# Patient Record
Sex: FEMALE | Race: WHITE | NOT HISPANIC OR LATINO | Employment: UNEMPLOYED | URBAN - METROPOLITAN AREA
[De-identification: names, ages, dates, MRNs, and addresses within clinical notes are randomized per-mention and may not be internally consistent; named-entity substitution may affect disease eponyms.]

---

## 2017-01-16 ENCOUNTER — ALLSCRIPTS OFFICE VISIT (OUTPATIENT)
Dept: OTHER | Facility: OTHER | Age: 42
End: 2017-01-16

## 2017-01-16 DIAGNOSIS — E78.5 HYPERLIPIDEMIA: ICD-10-CM

## 2017-04-02 ENCOUNTER — OFFICE VISIT (OUTPATIENT)
Dept: URGENT CARE | Facility: CLINIC | Age: 42
End: 2017-04-02

## 2017-04-02 PROCEDURE — 99214 OFFICE O/P EST MOD 30 MIN: CPT

## 2017-04-04 ENCOUNTER — ALLSCRIPTS OFFICE VISIT (OUTPATIENT)
Dept: OTHER | Facility: OTHER | Age: 42
End: 2017-04-04

## 2017-04-13 ENCOUNTER — ALLSCRIPTS OFFICE VISIT (OUTPATIENT)
Dept: OTHER | Facility: OTHER | Age: 42
End: 2017-04-13

## 2017-04-13 DIAGNOSIS — J01.10 ACUTE FRONTAL SINUSITIS: ICD-10-CM

## 2017-04-19 ENCOUNTER — GENERIC CONVERSION - ENCOUNTER (OUTPATIENT)
Dept: OTHER | Facility: OTHER | Age: 42
End: 2017-04-19

## 2017-07-24 ENCOUNTER — ALLSCRIPTS OFFICE VISIT (OUTPATIENT)
Dept: OTHER | Facility: OTHER | Age: 42
End: 2017-07-24

## 2017-07-24 DIAGNOSIS — I10 ESSENTIAL (PRIMARY) HYPERTENSION: ICD-10-CM

## 2018-01-13 VITALS
DIASTOLIC BLOOD PRESSURE: 90 MMHG | WEIGHT: 213.06 LBS | HEART RATE: 85 BPM | OXYGEN SATURATION: 98 % | SYSTOLIC BLOOD PRESSURE: 140 MMHG | HEIGHT: 63 IN | RESPIRATION RATE: 18 BRPM | TEMPERATURE: 98 F | BODY MASS INDEX: 37.75 KG/M2

## 2018-01-13 VITALS
DIASTOLIC BLOOD PRESSURE: 58 MMHG | TEMPERATURE: 98.5 F | SYSTOLIC BLOOD PRESSURE: 112 MMHG | RESPIRATION RATE: 18 BRPM | HEART RATE: 105 BPM | BODY MASS INDEX: 37.81 KG/M2 | WEIGHT: 213.44 LBS | OXYGEN SATURATION: 96 %

## 2018-01-13 VITALS
SYSTOLIC BLOOD PRESSURE: 150 MMHG | HEIGHT: 63 IN | DIASTOLIC BLOOD PRESSURE: 90 MMHG | TEMPERATURE: 98.3 F | RESPIRATION RATE: 18 BRPM | WEIGHT: 218.56 LBS | HEART RATE: 97 BPM | OXYGEN SATURATION: 95 % | BODY MASS INDEX: 38.73 KG/M2

## 2018-01-13 NOTE — PROGRESS NOTES
Assessment    1  Encounter for preventive health examination (V70 0) (Z00 00)   2  Generalized anxiety disorder (300 02) (F41 1)   3  Acute upper respiratory infection (465 9) (J06 9)   4  PVC (premature ventricular contraction) (427 69) (I49 3)   5  White coat hypertension (796 2) (R03 0)   6  Meralgia paraesthetica, left (355 1) (G57 12)   7  Dyslipidemia (272 4) (E78 5)    Plan  Acute upper respiratory infection    · Promethazine-Codeine 6 25-10 MG/5ML Oral Syrup; TAKE 5 - 10 ML EVERY 4  HOURS AS NEEDED FOR COUGH   · Ventolin  (90 Base) MCG/ACT Inhalation Aerosol Solution; INHALE 1 TO 2  PUFFS EVERY 6 HOURS AS NEEDED    Discussion/Summary    1  Health maintenance  - Anticipatory guidance given re: diet, exercise and discussed appropriate servings of fruits and vegetables  - Pt motivated to quit smoking and has switched to vapor cigarettes, assess on fu  - Deferring flu, pneumovax  Up to date on adacel  - Pap n/a given recent hysterectomy  - Pt deferred mammogram    2  Recent hysterectomy and oophorectomy for benign tumor  - FU with OBGYN    3  Meralgia parasthetica - post surgical  - Continue gabapentin     4  Generalized anxiety disorder  - Continue effexor    5  Obesity, white coat hypertension, dyslipidemia  - Check fasting labs and TSH before next visit  - Counseled on diet, exercise  - Normal ambulatory BP log    6  PVCs, history of palpitations  - Continue metoprolol     7  Acute URI  - Continue supportive care, prn albuterol for wheezing  Possible side effects of new medications were reviewed with the patient/guardian today  Chief Complaint  physical      History of Present Illness  HPI: Sheryl Current is here for her annual physical   She lives at home with her   She is currently not working  She describes her general health as good  She exercises for an hour five days a week  She does not eat fruits or vegetables  She denies all symptoms of depression    Her major life change this past year has been a hysterectomy after which she has noted weight gain  She has noted issues with sweats with exertion  She is a current smoker and reports drinking more than 6 drinks once a month but this is only in a social setting  She denies any recreational drug use  She denies any issues with urinary incontinence  Now has surgical menopause  She is concerned that she is not losing weight despite exercise and a low calorie diet and has been taking Raghu supplement  She has lost 17 lb since her last visit  Only other concern is 2 weeks of cold and cough, also has noted wheezing  Review of Systems    Constitutional: No fever, no chills, feels well, no tiredness, no recent weight gain or weight loss  Eyes: No complaints of eye pain, no red eyes, no eyesight problems, no discharge, no dry eyes, no itching of eyes  ENT: no complaints of earache, no loss of hearing, no nose bleeds, no nasal discharge, no sore throat, no hoarseness  Cardiovascular: No complaints of slow heart rate, no fast heart rate, no chest pain, no palpitations, no leg claudication, no lower extremity edema  Respiratory: No complaints of shortness of breath, no wheezing, no cough, no SOB on exertion, no orthopnea, no PND  Gastrointestinal: No complaints of abdominal pain, no constipation, no nausea or vomiting, no diarrhea, no bloody stools  Genitourinary: No complaints of dysuria, no incontinence, no pelvic pain, no dysmenorrhea, no vaginal discharge or bleeding  Musculoskeletal: No complaints of arthralgias, no myalgias, no joint swelling or stiffness, no limb pain or swelling  Integumentary: No complaints of skin rash or lesions, no itching, no skin wounds, no breast pain or lump  Neurological: No complaints of headache, no confusion, no convulsions, no numbness, no dizziness or fainting, no tingling, no limb weakness, no difficulty walking     Psychiatric: Not suicidal, no sleep disturbance, no anxiety or depression, no change in personality, no emotional problems  Endocrine: No complaints of proptosis, no hot flashes, no muscle weakness, no deepening of the voice, no feelings of weakness  Hematologic/Lymphatic: No complaints of swollen glands, no swollen glands in the neck, does not bleed easily, does not bruise easily  Active Problems    1  Generalized anxiety disorder (300 02) (F41 1)   2  Meralgia paraesthetica, left (355 1) (G57 12)   3  PVC (premature ventricular contraction) (427 69) (I49 3)   4  White coat hypertension (796 2) (R03 0)    Past Medical History    · History of hysterectomy (V88 01) (Z90 710)    Surgical History    · History of Hysterectomy    Family History  Mother    · Family history of diabetes mellitus (V18 0) (Z83 3)   · Family history of malignant neoplasm (V16 9) (Z80 9)    Social History    · Current every day smoker (305 1) (F17 200)   · Lives with significant other   · Pets/Animals: Bird   · Pets/Animals: Cat   · Pets/Animals: Dog   · Social drinker   · Tobacco use (305 1) (Z72 0)    Current Meds   1  Gabapentin 100 MG Oral Capsule; TAKE 1 CAPSULE 2 TIMES DAILY; Therapy: 52NVO7253 to (Evaluate:04Ubb0224)  Requested for: 83Ylz5199; Last   Rx:[id number] Ordered   2  Metoprolol Succinate ER 50 MG Oral Tablet Extended Release 24 Hour; Take 1 tablet   daily; Therapy: 42DMJ5749 to (Evaluate:49Vci9689)  Requested for: 92XTB0873; Last   Rx:[id number] Ordered   3  Venlafaxine HCl ER 75 MG Oral Capsule Extended Release 24 Hour; TAKE 1 CAPSULE   DAILY; Therapy: 01TRW7316 to (Evaluate:25Oct2016)  Requested for: 07Hbp1563; Last   Rx:[id number] Ordered    Allergies    1  No Known Drug Allergies    2  No Known Environmental Allergies   3   No Known Food Allergies    Vitals   Recorded: 32TMH7609 73:09SM   Systolic 436   Diastolic 82   Heart Rate 94   Respiration 18   Temperature 98 7 F   Height 5 ft 2 75 in   Weight 197 lb 7 0 oz   BMI Calculated 35 26   BSA Calculated 1 92     Physical Exam    Constitutional   General appearance: No acute distress, well appearing and well nourished  Eyes   Conjunctiva and lids: No swelling, erythema or discharge  Pupils and irises: Equal, round, reactive to light  Ophthalmoscopic examination: Normal fundi and optic discs  Ears, Nose, Mouth, and Throat   External inspection of ears and nose: Normal     Otoscopic examination: Tympanic membranes translucent with normal light reflex  Canals patent without erythema  Hearing: Normal     Nasal mucosa, septum, and turbinates: Abnormal   clear nasal dishcarge  Lips, teeth, and gums: Normal, good dentition  Oropharynx: Normal with no erythema, edema, exudate or lesions  Neck   Neck: Supple, symmetric, trachea midline, no masses  Thyroid: Normal, no thyromegaly  Pulmonary   Respiratory effort: No increased work of breathing or signs of respiratory distress  Percussion of chest: Normal     Palpation of chest: Normal     Auscultation of lungs: Abnormal   scattered wheezing  Cardiovascular   Palpation of heart: Normal PMI, no thrills  Auscultation of heart: Normal rate and rhythm, normal S1 and S2, no murmurs  Carotid pulses: 2+ bilaterally  Abdominal aorta: Normal     Femoral pulses: 2+ bilaterally  Pedal pulses: 2+ bilaterally  Examination of extremities for edema and/or varicosities: Normal     Chest   Breasts: Normal, no dimpling or skin changes appreciated  Palpation of breasts and axillae: Normal, no masses palpated  Abdomen   Abdomen: Non-tender, no masses  Liver and spleen: No hepatomegaly or splenomegaly  Examination for hernias: No hernia appreciated  Anus, perineum, and rectum: Normal sphincter tone, no masses, no prolapse  Stool sample for occult blood: Negative  Genitourinary   External genitalia and vagina: Normal, no lesions appreciated  Urethra: Normal, no discharge  Bladder: Not distended, no tenderness      Cervix: Normal, no lesions, Pap obtained  Uterus: Normal size, no tenderness, no masses  Adnexa/Parametria: Normal, no masses or tenderness  Lymphatic   Palpation of lymph nodes in neck: No lymphadenopathy  Palpation of lymph nodes in axillae: No lymphadenopathy  Palpation of lymph nodes in groin: No lymphadenopathy  Palpation of lymph nodes in other areas: No lymphadenopathy  Musculoskeletal   Gait and station: Normal     Digits and nails: Normal without clubbing or cyanosis  Joints, bones, and muscles: Normal     Range of motion: Normal     Stability: Normal     Muscle strength/tone: Normal     Skin   Skin and subcutaneous tissue: Normal without rashes or lesions  Palpation of skin and subcutaneous tissue: Normal turgor  Neurologic   Cranial nerves: Cranial nerves II-XII intact  Reflexes: 2+ and symmetric  Sensation: No sensory loss  Psychiatric   Judgment and insight: Normal     Orientation to person, place, and time: Normal     Recent and remote memory: Intact      Mood and affect: Normal        Signatures   Electronically signed by : Abby West MD; Aug 29 2016  3:51PM EST                       (Author)

## 2018-01-14 VITALS
SYSTOLIC BLOOD PRESSURE: 120 MMHG | OXYGEN SATURATION: 96 % | BODY MASS INDEX: 36.75 KG/M2 | DIASTOLIC BLOOD PRESSURE: 80 MMHG | HEART RATE: 96 BPM | TEMPERATURE: 98.7 F | HEIGHT: 63 IN | WEIGHT: 207.44 LBS | RESPIRATION RATE: 18 BRPM

## 2018-04-12 LAB
ALBUMIN SERPL BCP-MCNC: 4.5 G/DL (ref 3.5–5.7)
ALP SERPL-CCNC: 62 IU/L (ref 40–150)
ALT SERPL W P-5'-P-CCNC: 19 IU/L (ref 0–50)
AMYLASE (HISTORICAL): 49 U/L (ref 29–103)
ANION GAP SERPL CALCULATED.3IONS-SCNC: 12.2 MM/L
APTT PPP: 30.2 SEC (ref 24.4–37.6)
AST SERPL W P-5'-P-CCNC: 16 U/L (ref 7–26)
BASOPHILS # BLD AUTO: 0.1 X3/UL (ref 0–0.3)
BASOPHILS # BLD AUTO: 0.7 % (ref 0–2)
BILIRUB SERPL-MCNC: 0.3 MG/DL (ref 0.3–1)
BILIRUB UR QL STRIP: NEGATIVE
BUN SERPL-MCNC: 16 MG/DL (ref 7–25)
CALCIUM SERPL-MCNC: 9.3 MG/DL (ref 8.6–10.5)
CHLORIDE SERPL-SCNC: 102 MM/L (ref 98–107)
CLARITY UR: NORMAL
CO2 SERPL-SCNC: 24 MM/L (ref 21–31)
COLOR UR: YELLOW
CREAT SERPL-MCNC: 0.68 MG/DL (ref 0.6–1.2)
DEPRECATED RDW RBC AUTO: 13.6 % (ref 11.5–14.5)
EGFR (HISTORICAL): > 60 GFR
EGFR AFRICAN AMERICAN (HISTORICAL): > 60 GFR
EOSINOPHIL # BLD AUTO: 0.1 X3/UL (ref 0–0.5)
EOSINOPHIL NFR BLD AUTO: 0.8 % (ref 0–5)
GLUCOSE (HISTORICAL): 89 MG/DL (ref 65–99)
GLUCOSE UR STRIP-MCNC: NEGATIVE MG/DL
HCT VFR BLD AUTO: 45.6 % (ref 37–47)
HGB BLD-MCNC: 15.2 G/DL (ref 12–16)
HGB UR QL STRIP.AUTO: NEGATIVE
INR PPP: 0.92 (ref 0.9–1.5)
KETONES UR STRIP-MCNC: NEGATIVE MG/DL
LACTATE DEHYDROGENASE FLUID (HISTORICAL): 1.4 MM/L (ref 0.5–2)
LEUKOCYTE ESTERASE UR QL STRIP: NEGATIVE
LIPASE SERPL-CCNC: 76 U/L (ref 11–82)
LYMPHOCYTES # BLD AUTO: 3.3 X3/UL (ref 1.2–4.2)
LYMPHOCYTES NFR BLD AUTO: 24.2 % (ref 20.5–51.1)
MCH RBC QN AUTO: 31.8 PG (ref 26–34)
MCHC RBC AUTO-ENTMCNC: 33.3 G/DL (ref 31–36)
MCV RBC AUTO: 95.5 FL (ref 81–99)
MONOCYTES # BLD AUTO: 1 X3/UL (ref 0–1)
MONOCYTES NFR BLD AUTO: 7.4 % (ref 1.7–12)
NEUTROPHILS # BLD AUTO: 9.1 X3/UL (ref 1.4–6.5)
NEUTS SEG NFR BLD AUTO: 66.9 % (ref 42.2–75.2)
NITRITE UR QL STRIP: NEGATIVE
OSMOLALITY, SERUM (HISTORICAL): 269 MOSM (ref 262–291)
PH UR STRIP.AUTO: 6 [PH] (ref 4.5–8)
PLATELET # BLD AUTO: 340 X3/UL (ref 130–400)
PMV BLD AUTO: 8.5 FL (ref 8.6–11.7)
POTASSIUM SERPL-SCNC: 4.2 MM/L (ref 3.5–5.5)
PROT UR STRIP-MCNC: NEGATIVE MG/DL
PROTHROMBIN TIME (HISTORICAL): 10.7 SEC (ref 10.1–12.9)
RBC # BLD AUTO: 4.78 X6/UL (ref 3.9–5.2)
SODIUM SERPL-SCNC: 134 MM/L (ref 134–143)
SP GR UR STRIP.AUTO: 1.02 (ref 1–1.03)
TOTAL PROTEIN (HISTORICAL): 6.8 G/DL (ref 6.4–8.9)
UROBILINOGEN UR QL STRIP.AUTO: 0.2 EU/DL (ref 0.2–8)
WBC # BLD AUTO: 13.5 X3/UL (ref 4.8–10.8)

## 2018-05-04 ENCOUNTER — OFFICE VISIT (OUTPATIENT)
Dept: INTERNAL MEDICINE CLINIC | Facility: CLINIC | Age: 43
End: 2018-05-04
Payer: COMMERCIAL

## 2018-05-04 VITALS
BODY MASS INDEX: 38.74 KG/M2 | OXYGEN SATURATION: 95 % | DIASTOLIC BLOOD PRESSURE: 80 MMHG | HEART RATE: 91 BPM | TEMPERATURE: 98.6 F | SYSTOLIC BLOOD PRESSURE: 138 MMHG | HEIGHT: 64 IN | WEIGHT: 226.9 LBS

## 2018-05-04 DIAGNOSIS — Z13.6 SCREENING FOR CARDIOVASCULAR CONDITION: ICD-10-CM

## 2018-05-04 DIAGNOSIS — E55.9 VITAMIN D DEFICIENCY: ICD-10-CM

## 2018-05-04 DIAGNOSIS — E78.5 DYSLIPIDEMIA: ICD-10-CM

## 2018-05-04 DIAGNOSIS — F41.1 GENERALIZED ANXIETY DISORDER: ICD-10-CM

## 2018-05-04 DIAGNOSIS — F17.200 SMOKING: ICD-10-CM

## 2018-05-04 DIAGNOSIS — R63.5 WEIGHT GAIN: ICD-10-CM

## 2018-05-04 DIAGNOSIS — Z12.39 SCREENING FOR BREAST CANCER: ICD-10-CM

## 2018-05-04 DIAGNOSIS — I10 BENIGN ESSENTIAL HYPERTENSION: Primary | ICD-10-CM

## 2018-05-04 PROBLEM — IMO0001 SMOKING: Status: ACTIVE | Noted: 2018-05-04

## 2018-05-04 PROCEDURE — 99214 OFFICE O/P EST MOD 30 MIN: CPT | Performed by: NURSE PRACTITIONER

## 2018-05-04 RX ORDER — MELATONIN
1000 DAILY
COMMUNITY
End: 2018-08-16

## 2018-05-04 RX ORDER — NICOTINE 21 MG/24HR
1 PATCH, TRANSDERMAL 24 HOURS TRANSDERMAL EVERY 24 HOURS
Qty: 28 PATCH | Refills: 3 | Status: SHIPPED | OUTPATIENT
Start: 2018-05-04 | End: 2018-08-16

## 2018-05-04 RX ORDER — TERBINAFINE HYDROCHLORIDE 250 MG/1
250 TABLET ORAL DAILY
COMMUNITY
End: 2018-08-16

## 2018-05-04 RX ORDER — METOPROLOL SUCCINATE 50 MG/1
50 TABLET, EXTENDED RELEASE ORAL DAILY
Refills: 3 | COMMUNITY
Start: 2018-04-19 | End: 2018-08-16

## 2018-05-04 RX ORDER — VENLAFAXINE HYDROCHLORIDE 150 MG/1
CAPSULE, EXTENDED RELEASE ORAL
COMMUNITY
End: 2018-07-11 | Stop reason: DRUGHIGH

## 2018-05-04 RX ORDER — ALBUTEROL SULFATE 90 UG/1
AEROSOL, METERED RESPIRATORY (INHALATION) EVERY 4 HOURS
COMMUNITY
End: 2019-05-11 | Stop reason: SDUPTHER

## 2018-05-04 RX ORDER — ALPRAZOLAM 2 MG/1
TABLET ORAL
Refills: 5 | COMMUNITY
Start: 2018-04-30 | End: 2018-05-04

## 2018-05-04 RX ORDER — LISINOPRIL AND HYDROCHLOROTHIAZIDE 12.5; 1 MG/1; MG/1
TABLET ORAL
COMMUNITY
End: 2018-08-16

## 2018-05-04 RX ORDER — ALPRAZOLAM 1 MG/1
1 TABLET ORAL 2 TIMES DAILY
COMMUNITY
End: 2018-06-06 | Stop reason: SDUPTHER

## 2018-05-04 RX ORDER — FLUTICASONE FUROATE AND VILANTEROL 100; 25 UG/1; UG/1
POWDER RESPIRATORY (INHALATION)
COMMUNITY
End: 2018-08-16

## 2018-05-04 NOTE — PROGRESS NOTES
Assessment/Plan: Will order fasting labs for patient to have done  She is following up with therapy on the 18th and wants to be referred to Psychiatry and states her therapist will set her up with one  She is taking Effexor 150 mg daily and Xanax 1 mg BID  She was called in nicotine patches for smoking cessation  She contracts for safety today in the office  BP was rechecked and is 138/80 HR 91  She is taking Toprol XL 50 mg daily and Lisinopril/HCTZ 10-12 5 mg daily  Will give script for mammogram to have done  She was counseled on weight management and different avenues to take with this  She was advised I do not give weight loss medication  Will bring her back after her therapy appointment on the 18th and she was advised if any issues to call the office  No problem-specific Assessment & Plan notes found for this encounter           Problem List Items Addressed This Visit     Benign essential hypertension - Primary    Relevant Medications    lisinopril-hydrochlorothiazide (PRINZIDE,ZESTORETIC) 10-12 5 MG per tablet    metoprolol succinate (TOPROL-XL) 50 mg 24 hr tablet    Other Relevant Orders    Comprehensive metabolic panel    CBC and differential    TSH, 3rd generation with T4 reflex    Dyslipidemia    Relevant Orders    Comprehensive metabolic panel    CBC and differential    TSH, 3rd generation with T4 reflex    Generalized anxiety disorder    Relevant Medications    venlafaxine (EFFEXOR-XR) 150 mg 24 hr capsule    ALPRAZolam (XANAX) 1 mg tablet    nicotine (NICODERM CQ) 21 mg/24 hr TD 24 hr patch    Other Relevant Orders    Comprehensive metabolic panel    CBC and differential    TSH, 3rd generation with T4 reflex    Screening for cardiovascular condition    Relevant Orders    Lipid panel    Vitamin D deficiency    Relevant Orders    Vitamin D 1,25 dihydroxy    Screening for breast cancer    Relevant Orders    Mammo screening bilateral w 3d & cad    Smoking    Relevant Medications    nicotine (NICODERM CQ) 21 mg/24 hr TD 24 hr patch    Weight gain            Subjective:      Patient ID: Manasa Caba is a 37 y o  female  Anuel Chaves is here today to establish care as a new patient  She was seen by Dr Regla Del Real over one year ago and did start seeing Dr Amando Carl  She is having issues with anxiety and depression and is taking Effexor and Xanax 1 mg BID  She states she was taking Zoloft but did wean herself off of it  She states she is having crying spells and at times feels she is worthless  She did start a new job which is very stressful and she feels this is the cause of most of her symptoms  She is following up with a Therapist on May18th  She states she takes Xanax BID and never misses a dose and feels this helps her symptoms  She denies any chest pain, SOB, or palpitations  She is taking BP medication and did forget to take her medications today  She states she did have a hyster done around 3 years ago  She is smoking around 2 PPD and would like to quit and she states she drinking at least 3 glasses of vodka a night  She states this is concerning her and this is out of the norm for her  She also is worried about her recent weight gain and would like to try dieting  She has not had a mammogram and would like a referral for this  She offers no other complaints today  The following portions of the patient's history were reviewed and updated as appropriate:   She  has a past medical history of Depression; H/O: hysterectomy; Hypertension; and Obesity    She   Patient Active Problem List    Diagnosis Date Noted    Screening for cardiovascular condition 05/04/2018    Vitamin D deficiency 05/04/2018    Screening for breast cancer 05/04/2018    Smoking 05/04/2018    Weight gain 05/04/2018    Benign essential hypertension 04/04/2017    Dyslipidemia 08/29/2016    Generalized anxiety disorder 12/30/2015    PVC (premature ventricular contraction) 12/30/2015     She  has a past surgical history that includes Hysterectomy and Tumor removal   Her family history includes Cancer in her mother; Diabetes in her mother  She  reports that she has been smoking  She uses smokeless tobacco  She reports that she drinks about 12 6 oz of alcohol per week   She reports that she does not use drugs  Current Outpatient Prescriptions   Medication Sig Dispense Refill    albuterol (VENTOLIN HFA) 90 mcg/act inhaler every 4 (four) hours      ALPRAZolam (XANAX) 1 mg tablet Take 1 tablet by mouth 2 (two) times a day      cholecalciferol (VITAMIN D3) 1,000 units tablet Take 1,000 Units by mouth daily      fluticasone furoate-vilanterol (BREO ELLIPTA) Breo Ellipta 100 mcg-25 mcg/dose powder for inhalation      lisinopril-hydrochlorothiazide (PRINZIDE,ZESTORETIC) 10-12 5 MG per tablet lisinopril 10 mg-hydrochlorothiazide 12 5 mg tablet      metoprolol succinate (TOPROL-XL) 50 mg 24 hr tablet Take 50 mg by mouth daily  3    venlafaxine (EFFEXOR-XR) 150 mg 24 hr capsule venlafaxine  mg capsule,extended release 24 hr      nicotine (NICODERM CQ) 21 mg/24 hr TD 24 hr patch Place 1 patch on the skin every 24 hours 28 patch 3    terbinafine (LamISIL) 250 mg tablet Take 250 mg by mouth daily       No current facility-administered medications for this visit  No current outpatient prescriptions on file prior to visit  No current facility-administered medications on file prior to visit  She is allergic to fluoxetine       Review of Systems   Constitutional: Negative  HENT: Negative  Eyes: Negative  Respiratory: Negative  Cardiovascular: Negative  Gastrointestinal: Negative  Endocrine: Negative  Genitourinary: Negative  Musculoskeletal: Negative  Skin: Negative  Allergic/Immunologic: Negative  Neurological: Negative  Hematological: Negative  Psychiatric/Behavioral: The patient is nervous/anxious            Objective:      /80 (BP Location: Right arm, Patient Position: Sitting, Cuff Size: Large)   Pulse 91   Temp 98 6 °F (37 °C) (Temporal)   Ht 5' 3 5" (1 613 m)   Wt 103 kg (226 lb 14 4 oz)   SpO2 95%   BMI 39 56 kg/m²          Physical Exam   Constitutional: She is oriented to person, place, and time  She appears well-developed and well-nourished  HENT:   Head: Normocephalic and atraumatic  Right Ear: External ear normal    Left Ear: External ear normal    Nose: Nose normal    Mouth/Throat: Oropharynx is clear and moist    Eyes: Conjunctivae and EOM are normal  Pupils are equal, round, and reactive to light  Neck: Normal range of motion  Neck supple  Cardiovascular: Normal rate, normal heart sounds and intact distal pulses  Pulmonary/Chest: Effort normal and breath sounds normal    Abdominal: Soft  Bowel sounds are normal    Musculoskeletal: Normal range of motion  Neurological: She is alert and oriented to person, place, and time  She has normal reflexes  Skin: Skin is warm and dry  Psychiatric: She has a normal mood and affect  Her behavior is normal  Judgment and thought content normal    Vitals reviewed

## 2018-05-15 ENCOUNTER — OFFICE VISIT (OUTPATIENT)
Dept: URGENT CARE | Facility: CLINIC | Age: 43
End: 2018-05-15
Payer: COMMERCIAL

## 2018-05-15 VITALS
TEMPERATURE: 97.7 F | RESPIRATION RATE: 20 BRPM | OXYGEN SATURATION: 96 % | BODY MASS INDEX: 38.58 KG/M2 | DIASTOLIC BLOOD PRESSURE: 86 MMHG | SYSTOLIC BLOOD PRESSURE: 132 MMHG | HEART RATE: 94 BPM | WEIGHT: 226 LBS | HEIGHT: 64 IN

## 2018-05-15 DIAGNOSIS — R11.0 NAUSEA: ICD-10-CM

## 2018-05-15 DIAGNOSIS — K52.9 GASTROENTERITIS: Primary | ICD-10-CM

## 2018-05-15 PROCEDURE — 99213 OFFICE O/P EST LOW 20 MIN: CPT | Performed by: PHYSICIAN ASSISTANT

## 2018-05-15 RX ORDER — ONDANSETRON 4 MG/1
4 TABLET, ORALLY DISINTEGRATING ORAL EVERY 6 HOURS PRN
Qty: 15 TABLET | Refills: 0 | Status: SHIPPED | OUTPATIENT
Start: 2018-05-15 | End: 2018-08-16

## 2018-05-15 RX ORDER — ONDANSETRON 4 MG/1
4 TABLET, ORALLY DISINTEGRATING ORAL ONCE
Status: COMPLETED | OUTPATIENT
Start: 2018-05-15 | End: 2018-05-15

## 2018-05-15 RX ADMIN — ONDANSETRON 4 MG: 4 TABLET, ORALLY DISINTEGRATING ORAL at 09:07

## 2018-05-15 NOTE — PATIENT INSTRUCTIONS
Gastroenteritis   AMBULATORY CARE:   Gastroenteritis , or stomach flu, is an infection of the stomach and intestines  It is caused by bacteria, parasites, or viruses  Common symptoms include the following:   · Diarrhea or gas    · Nausea, vomiting, or poor appetite    · Abdominal cramps, pain, or gurgling    · Fever    · Tiredness or weakness    · Headaches or muscle aches with any of the above symptoms  Call 911 for any of the following:   · You have trouble breathing or a very fast pulse  Seek care immediately if:   · You see blood in your diarrhea  · You cannot stop vomiting  · You have not urinated for 12 hours  · You feel like you are going to faint  Contact your healthcare provider if:   · You have a fever  · You continue to vomit or have diarrhea, even after treatment  · You see worms in your diarrhea  · Your mouth or eyes are dry  You are not urinating as much or as often  · You have questions or concerns about your condition or care  Treatment for gastroenteritis  may include medicines to slow or stop your diarrhea or vomiting  You may also need medicines to treat an infection caused by bacteria or a parasite  Manage your symptoms:   · Drink liquids as directed  Ask your healthcare provider how much liquid to drink each day, and which liquids are best for you  You may also need to drink an oral rehydration solution (ORS)  An ORS has the right amounts of sugar, salt, and minerals in water to replace body fluids  · Eat bland foods  When you feel hungry, begin eating soft, bland foods  Examples are bananas, clear soup, potatoes, and applesauce  Do not have dairy products, alcohol, sugary drinks, or drinks with caffeine until you feel better  · Rest as much as possible  Slowly start to do more each day when you begin to feel better  Prevent the spread of germs:  Gastroenteritis can spread easily   Keep yourself, your family, and your surroundings clean to help prevent the spread of gastroenteritis:  · Wash your hands often  Use soap and water  Wash your hands after you use the bathroom, change a child's diapers, or sneeze  Wash your hands before you prepare or eat food  · Clean surfaces and do laundry often  Wash your clothes and towels separately from the rest of the laundry  Clean surfaces in your home with antibacterial  or bleach  · Clean food thoroughly and cook safely  Wash raw vegetables before you cook  Cook meat, fish, and eggs fully  Do not use the same dishes for raw meat as you do for other foods  Refrigerate any leftover food immediately  · Be aware when you camp or travel  Drink only clean water  Do not drink from rivers or lakes unless you purify or boil the water first  When you travel, drink bottled water and do not add ice  Do not eat fruit that has not been peeled  Do not eat raw fish or meat that is not fully cooked  Follow up with your healthcare provider as directed:  Write down your questions so you remember to ask them during your visits  © 2017 [facility] Information is for End User's use only and may not be sold, redistributed or otherwise used for commercial purposes  All illustrations and images included in CareNotes® are the copyrighted property of [employer] D [employer]  or [employer] Cast[employer]  The above information is an  only  It is not intended as medical advice for individual conditions or treatments  Talk to your doctor, nurse or pharmacist before following any medical regimen to see if it is safe and effective for you

## 2018-05-15 NOTE — PROGRESS NOTES
[facility]        NAME: Dami Chester is a 37 y o  female  : 1975    MRN: [id number]  DATE: May 15, 2018  TIME: 9:19 AM    Assessment and Plan   Gastroenteritis [K52 9]  1  Gastroenteritis  ondansetron (ZOFRAN-ODT) 4 mg disintegrating tablet   2  Nausea  ondansetron (ZOFRAN-ODT) dispersible tablet 4 mg         Patient Instructions      If the Zofran does not improve nausea and vomiting taking continue fluids go to the nearest emergency room for IV fluids  Follow up with PCP in 3-5 days  Proceed to  ER if symptoms worsen  Chief Complaint     Chief Complaint   Patient presents with    Vomiting     C/O nausea, vomiting, diarrhea and headache since last PM  Pt is concerned that it is from potato salad that she ate 2 days ago  Her  has the same symptoms  Pt is tolerating fluids  Pt had 3 episodes of vomiting today  History of Present Illness       Patient presents nausea vomiting diarrhea and headache which started last evening     Initially she believed it was secondary to potato salad to the  has the same type of symptoms  However, her father ate the same potato salad and does not have the same symptoms  Patient was able to tolerate apple juice this morning  He had 3 episodes of vomiting last evening and 3 episodes of this morning  Review of Systems   Review of Systems   Constitutional: Positive for appetite change  Negative for chills and fever  HENT: Negative for congestion, postnasal drip, rhinorrhea, sore throat and tinnitus  Eyes: Negative for redness  Respiratory: Negative for cough, shortness of breath and wheezing  Cardiovascular: Negative for chest pain and palpitations  Gastrointestinal: Positive for abdominal pain (generalized)  Genitourinary: Negative for difficulty urinating  Musculoskeletal: Negative for myalgias  Skin: Negative for rash  Neurological: Positive for light-headedness and headaches     Hematological: Negative for adenopathy  Current Medications       Current Outpatient Prescriptions:     albuterol (VENTOLIN HFA) 90 mcg/act inhaler, every 4 (four) hours, Disp: , Rfl:     ALPRAZolam (XANAX) 1 mg tablet, Take 1 tablet by mouth 2 (two) times a day, Disp: , Rfl:     cholecalciferol (VITAMIN D3) 1,000 units tablet, Take 1,000 Units by mouth daily, Disp: , Rfl:     fluticasone furoate-vilanterol (BREO ELLIPTA), Breo Ellipta 100 mcg-25 mcg/dose powder for inhalation, Disp: , Rfl:     lisinopril-hydrochlorothiazide (PRINZIDE,ZESTORETIC) 10-12 5 MG per tablet, lisinopril 10 mg-hydrochlorothiazide 12 5 mg tablet, Disp: , Rfl:     metoprolol succinate (TOPROL-XL) 50 mg 24 hr tablet, Take 50 mg by mouth daily, Disp: , Rfl: 3    venlafaxine (EFFEXOR-XR) 150 mg 24 hr capsule, venlafaxine  mg capsule,extended release 24 hr, Disp: , Rfl:     nicotine (NICODERM CQ) 21 mg/24 hr TD 24 hr patch, Place 1 patch on the skin every 24 hours, Disp: 28 patch, Rfl: 3    ondansetron (ZOFRAN-ODT) 4 mg disintegrating tablet, Take 1 tablet (4 mg total) by mouth every 6 (six) hours as needed for nausea or vomiting, Disp: 15 tablet, Rfl: 0    terbinafine (LamISIL) 250 mg tablet, Take 250 mg by mouth daily, Disp: , Rfl:   No current facility-administered medications for this visit       Current Allergies     Allergies as of 05/15/2018 - Reviewed 05/15/2018   Allergen Reaction Noted    Fluoxetine              The following portions of the patient's history were reviewed and updated as appropriate: allergies, current medications, past family history, past medical history, past social history, past surgical history and problem list      Past Medical History:   Diagnosis Date    Depression     H/O: hysterectomy     LAST ASSESSED 30 DEC 2015    Hypertension     Obesity     PVC (premature ventricular contraction)        Past Surgical History:   Procedure Laterality Date    HYSTERECTOMY      TUMOR REMOVAL         Family History Problem Relation Age of Onset    Diabetes Mother     Cancer Mother          Medications have been verified  Objective   /86   Pulse 94   Temp 97 7 °F (36 5 °C)   Resp 20   Ht 5' 3 5" (1 613 m)   Wt 103 kg (226 lb)   SpO2 96%   BMI 39 41 kg/m²        Physical Exam     Physical Exam   Constitutional: She is oriented to person, place, and time  She appears well-developed and well-nourished  HENT:   Head: Normocephalic and atraumatic  Right Ear: External ear normal    Left Ear: External ear normal    Nose: Nose normal    Mouth/Throat: Oropharynx is clear and moist    Eyes: Conjunctivae are normal    Neck: Neck supple  Cardiovascular: Normal rate, regular rhythm and normal heart sounds  Pulmonary/Chest: Effort normal and breath sounds normal    Abdominal: Soft  She exhibits no mass  There is no tenderness  There is no rebound and no guarding  Lymphadenopathy:     She has no cervical adenopathy  Neurological: She is alert and oriented to person, place, and time  Skin: Skin is warm and dry  No rash noted  Psychiatric: She has a normal mood and affect   Her behavior is normal  Judgment and thought content normal

## 2018-06-06 DIAGNOSIS — F41.9 ANXIETY: Primary | ICD-10-CM

## 2018-06-06 RX ORDER — ALPRAZOLAM 1 MG/1
0.5 TABLET ORAL
Qty: 60 TABLET | Refills: 0 | Status: SHIPPED | OUTPATIENT
Start: 2018-06-06 | End: 2018-07-11 | Stop reason: DRUGHIGH

## 2018-06-06 NOTE — TELEPHONE ENCOUNTER
Patient stated that she has gotten Xanax 2mg tablet in the past and splits it in half- taking 1/2 tablet twice daily  The order in the chart is 1mg BID

## 2018-06-07 NOTE — TELEPHONE ENCOUNTER
Received a call from [facility]  stating that they went to the pharmacy and picked up the RX  He states that she was taking 0 5tab of 2mg Xanax  Per Diana Rios, pt is to take 0 5mg bid and follow up with a psychiatrist to manage meds  He verbalized understanding

## 2018-07-11 ENCOUNTER — OFFICE VISIT (OUTPATIENT)
Dept: INTERNAL MEDICINE CLINIC | Facility: CLINIC | Age: 43
End: 2018-07-11
Payer: COMMERCIAL

## 2018-07-11 VITALS
SYSTOLIC BLOOD PRESSURE: 142 MMHG | WEIGHT: 229.2 LBS | BODY MASS INDEX: 39.13 KG/M2 | TEMPERATURE: 97.8 F | OXYGEN SATURATION: 96 % | DIASTOLIC BLOOD PRESSURE: 90 MMHG | HEIGHT: 64 IN | HEART RATE: 94 BPM

## 2018-07-11 DIAGNOSIS — R20.0 NUMBNESS AND TINGLING: ICD-10-CM

## 2018-07-11 DIAGNOSIS — R20.2 NUMBNESS AND TINGLING: ICD-10-CM

## 2018-07-11 DIAGNOSIS — M62.838 MUSCLE SPASMS OF NECK: ICD-10-CM

## 2018-07-11 DIAGNOSIS — M54.40 ACUTE LEFT-SIDED LOW BACK PAIN WITH SCIATICA, SCIATICA LATERALITY UNSPECIFIED: Primary | ICD-10-CM

## 2018-07-11 PROCEDURE — 99214 OFFICE O/P EST MOD 30 MIN: CPT | Performed by: NURSE PRACTITIONER

## 2018-07-11 RX ORDER — METHOCARBAMOL 500 MG/1
TABLET, FILM COATED ORAL
Qty: 60 TABLET | Refills: 0 | Status: SHIPPED | OUTPATIENT
Start: 2018-07-11 | End: 2018-08-16

## 2018-07-11 RX ORDER — GABAPENTIN 100 MG/1
100 CAPSULE ORAL 2 TIMES DAILY
Qty: 60 CAPSULE | Refills: 0 | Status: SHIPPED | OUTPATIENT
Start: 2018-07-11 | End: 2018-09-25

## 2018-07-11 NOTE — PROGRESS NOTES
Assessment/Plan: Patient at this time will not follow up with PT or Pain Management  Will call in Robaxin 500 mg BID PRN and Gabapentin 100 mg BID  I did explain this medications are only short term  She at this time wants to proceed with an EMG rather than an MRI due to her co-pay  I did advise her to continue her BP medications and she should follow up with Psychiatry at this time for her anxiety  I did advise her I will not be prescribing any Benzos at this time and my recommendation is following up with Psychiatry  She did comply and states she will continue her BP medications and will consider an appointment with counseling  Will follow up as needed  No problem-specific Assessment & Plan notes found for this encounter  Problem List Items Addressed This Visit     Acute left-sided low back pain with sciatica - Primary    Relevant Medications    gabapentin (NEURONTIN) 100 mg capsule    Other Relevant Orders    EMG 1 Limb    Numbness and tingling    Relevant Medications    gabapentin (NEURONTIN) 100 mg capsule    Other Relevant Orders    EMG 1 Limb    Muscle spasms of neck    Relevant Medications    methocarbamol (ROBAXIN) 500 mg tablet            Subjective:      Patient ID: Alexander Chaudhary is a 37 y o  female  Mely Dick is here today for an acute visit  She is having left lower back and groin pain  She states the pain started around one month ago and is now radiating down her left lower leg  She states both her feet are having numbness and tingling as well  In the past she was on Gabapentin which she felt her dose was not high enough and did not work  She states she did also stop all her medications due to her gaining weight  She states she still does have panic attacks but does not want to see Psychiatry  She states she is doing much better since stopping her Effexor and Xanax but is not sure what to do when having panic attacks    She states she did switch jobs and this has been much better with her anxiety  She does stand all day on her feet and states this is when her pain is the worse  She states she has been applying muscle rubs and ice which is not helping  She is not willing to go to pain management or PT at this time  She offers no other issues  The following portions of the patient's history were reviewed and updated as appropriate:   She  has a past medical history of Depression; H/O: hysterectomy; Hypertension; Obesity; and PVC (premature ventricular contraction)  She   Patient Active Problem List    Diagnosis Date Noted    Acute left-sided low back pain with sciatica 07/11/2018    Numbness and tingling 07/11/2018    Muscle spasms of neck 07/11/2018    Screening for cardiovascular condition 05/04/2018    Vitamin D deficiency 05/04/2018    Screening for breast cancer 05/04/2018    Smoking 05/04/2018    Weight gain 05/04/2018    Benign essential hypertension 04/04/2017    Dyslipidemia 08/29/2016    Generalized anxiety disorder 12/30/2015    PVC (premature ventricular contraction) 12/30/2015     She  has a past surgical history that includes Hysterectomy and Tumor removal   Her family history includes Cancer in her mother; Diabetes in her mother  She  reports that she has been smoking  She uses smokeless tobacco  She reports that she does not drink alcohol or use drugs    Current Outpatient Prescriptions   Medication Sig Dispense Refill    albuterol (VENTOLIN HFA) 90 mcg/act inhaler every 4 (four) hours      fluticasone furoate-vilanterol (BREO ELLIPTA) Breo Ellipta 100 mcg-25 mcg/dose powder for inhalation      cholecalciferol (VITAMIN D3) 1,000 units tablet Take 1,000 Units by mouth daily      gabapentin (NEURONTIN) 100 mg capsule Take 1 capsule (100 mg total) by mouth 2 (two) times a day 60 capsule 0    lisinopril-hydrochlorothiazide (PRINZIDE,ZESTORETIC) 10-12 5 MG per tablet lisinopril 10 mg-hydrochlorothiazide 12 5 mg tablet      methocarbamol (ROBAXIN) 500 mg tablet Take one tablet by mouth every 12 hours as needed 60 tablet 0    metoprolol succinate (TOPROL-XL) 50 mg 24 hr tablet Take 50 mg by mouth daily  3    nicotine (NICODERM CQ) 21 mg/24 hr TD 24 hr patch Place 1 patch on the skin every 24 hours 28 patch 3    ondansetron (ZOFRAN-ODT) 4 mg disintegrating tablet Take 1 tablet (4 mg total) by mouth every 6 (six) hours as needed for nausea or vomiting 15 tablet 0    terbinafine (LamISIL) 250 mg tablet Take 250 mg by mouth daily       No current facility-administered medications for this visit  Current Outpatient Prescriptions on File Prior to Visit   Medication Sig    albuterol (VENTOLIN HFA) 90 mcg/act inhaler every 4 (four) hours    fluticasone furoate-vilanterol (BREO ELLIPTA) Breo Ellipta 100 mcg-25 mcg/dose powder for inhalation    cholecalciferol (VITAMIN D3) 1,000 units tablet Take 1,000 Units by mouth daily    lisinopril-hydrochlorothiazide (PRINZIDE,ZESTORETIC) 10-12 5 MG per tablet lisinopril 10 mg-hydrochlorothiazide 12 5 mg tablet    metoprolol succinate (TOPROL-XL) 50 mg 24 hr tablet Take 50 mg by mouth daily    nicotine (NICODERM CQ) 21 mg/24 hr TD 24 hr patch Place 1 patch on the skin every 24 hours    ondansetron (ZOFRAN-ODT) 4 mg disintegrating tablet Take 1 tablet (4 mg total) by mouth every 6 (six) hours as needed for nausea or vomiting    terbinafine (LamISIL) 250 mg tablet Take 250 mg by mouth daily    [DISCONTINUED] ALPRAZolam (XANAX) 1 mg tablet Take 0 5 tablets (0 5 mg total) by mouth daily at bedtime as needed for anxiety    [DISCONTINUED] venlafaxine (EFFEXOR-XR) 150 mg 24 hr capsule venlafaxine  mg capsule,extended release 24 hr     No current facility-administered medications on file prior to visit  She is allergic to fluoxetine       Review of Systems   Constitutional: Negative  HENT: Negative  Eyes: Negative  Respiratory: Negative  Cardiovascular: Negative      Gastrointestinal: Negative  Endocrine: Negative  Genitourinary: Negative  Musculoskeletal: Positive for arthralgias, back pain and myalgias  Skin: Negative  Allergic/Immunologic: Negative  Hematological: Negative  Psychiatric/Behavioral: Negative  Objective:      /90 (BP Location: Right arm, Patient Position: Sitting, Cuff Size: Large)   Pulse 94   Temp 97 8 °F (36 6 °C) (Temporal)   Ht 5' 3 5" (1 613 m)   Wt 104 kg (229 lb 3 2 oz)   SpO2 96%   BMI 39 96 kg/m²          Physical Exam   Constitutional: She is oriented to person, place, and time  She appears well-developed and well-nourished  HENT:   Head: Normocephalic and atraumatic  Right Ear: External ear normal    Left Ear: External ear normal    Nose: Nose normal    Mouth/Throat: Oropharynx is clear and moist    Eyes: Conjunctivae and EOM are normal  Pupils are equal, round, and reactive to light  Neck: Normal range of motion  Neck supple  Cardiovascular: Normal rate, regular rhythm, normal heart sounds and intact distal pulses  Pulmonary/Chest: Effort normal and breath sounds normal    Abdominal: Soft  Bowel sounds are normal    Musculoskeletal: Normal range of motion  Pain with flexion and extension noted to left lower extremity, pain on palpation to left upper groin and left lower back   Neurological: She is alert and oriented to person, place, and time  She has normal reflexes  Skin: Skin is warm and dry  Psychiatric: She has a normal mood and affect  Her behavior is normal  Judgment and thought content normal    Vitals reviewed

## 2018-08-16 ENCOUNTER — OFFICE VISIT (OUTPATIENT)
Dept: INTERNAL MEDICINE CLINIC | Facility: CLINIC | Age: 43
End: 2018-08-16
Payer: COMMERCIAL

## 2018-08-16 VITALS
HEIGHT: 64 IN | WEIGHT: 224 LBS | BODY MASS INDEX: 38.24 KG/M2 | SYSTOLIC BLOOD PRESSURE: 142 MMHG | OXYGEN SATURATION: 95 % | DIASTOLIC BLOOD PRESSURE: 86 MMHG | RESPIRATION RATE: 18 BRPM | TEMPERATURE: 99.4 F | HEART RATE: 110 BPM

## 2018-08-16 DIAGNOSIS — R20.0 NUMBNESS AND TINGLING: ICD-10-CM

## 2018-08-16 DIAGNOSIS — F41.1 GENERALIZED ANXIETY DISORDER: ICD-10-CM

## 2018-08-16 DIAGNOSIS — F17.200 SMOKING: ICD-10-CM

## 2018-08-16 DIAGNOSIS — I10 BENIGN ESSENTIAL HYPERTENSION: ICD-10-CM

## 2018-08-16 DIAGNOSIS — Z12.31 VISIT FOR SCREENING MAMMOGRAM: ICD-10-CM

## 2018-08-16 DIAGNOSIS — Z13.0 SCREENING FOR DEFICIENCY ANEMIA: ICD-10-CM

## 2018-08-16 DIAGNOSIS — Z13.1 SCREENING FOR DIABETES MELLITUS: ICD-10-CM

## 2018-08-16 DIAGNOSIS — F33.1 MODERATE EPISODE OF RECURRENT MAJOR DEPRESSIVE DISORDER (HCC): Primary | ICD-10-CM

## 2018-08-16 DIAGNOSIS — Z13.220 SCREENING, LIPID: ICD-10-CM

## 2018-08-16 DIAGNOSIS — I10 BENIGN ESSENTIAL HTN: ICD-10-CM

## 2018-08-16 DIAGNOSIS — J44.9 CHRONIC OBSTRUCTIVE PULMONARY DISEASE, UNSPECIFIED COPD TYPE (HCC): ICD-10-CM

## 2018-08-16 DIAGNOSIS — Z13.29 SCREENING FOR THYROID DISORDER: ICD-10-CM

## 2018-08-16 DIAGNOSIS — E55.9 VITAMIN D DEFICIENCY: ICD-10-CM

## 2018-08-16 DIAGNOSIS — I49.3 PVC (PREMATURE VENTRICULAR CONTRACTION): ICD-10-CM

## 2018-08-16 DIAGNOSIS — R63.5 WEIGHT GAIN: ICD-10-CM

## 2018-08-16 DIAGNOSIS — R20.2 NUMBNESS AND TINGLING: ICD-10-CM

## 2018-08-16 PROBLEM — M62.838 MUSCLE SPASMS OF NECK: Status: RESOLVED | Noted: 2018-07-11 | Resolved: 2018-08-16

## 2018-08-16 PROBLEM — F32.9 MAJOR DEPRESSIVE DISORDER WITH CURRENT ACTIVE EPISODE: Status: ACTIVE | Noted: 2018-08-16

## 2018-08-16 PROBLEM — M54.40 ACUTE LEFT-SIDED LOW BACK PAIN WITH SCIATICA: Status: RESOLVED | Noted: 2018-07-11 | Resolved: 2018-08-16

## 2018-08-16 PROCEDURE — 99214 OFFICE O/P EST MOD 30 MIN: CPT | Performed by: PHYSICIAN ASSISTANT

## 2018-08-16 RX ORDER — FLUTICASONE FUROATE AND VILANTEROL 100; 25 UG/1; UG/1
1 POWDER RESPIRATORY (INHALATION) DAILY
Qty: 1 INHALER | Refills: 2 | Status: SHIPPED | COMMUNITY
Start: 2018-08-16 | End: 2019-10-18 | Stop reason: SDUPTHER

## 2018-08-16 RX ORDER — ALPRAZOLAM 1 MG/1
1 TABLET ORAL
Qty: 30 TABLET | Refills: 0 | Status: SHIPPED | OUTPATIENT
Start: 2018-08-16 | End: 2018-09-13 | Stop reason: SDUPTHER

## 2018-08-16 RX ORDER — BUPROPION HYDROCHLORIDE 150 MG/1
150 TABLET ORAL DAILY
Qty: 30 TABLET | Refills: 3 | Status: SHIPPED | OUTPATIENT
Start: 2018-08-16 | End: 2018-09-25 | Stop reason: SDUPTHER

## 2018-08-16 RX ORDER — LISINOPRIL AND HYDROCHLOROTHIAZIDE 12.5; 1 MG/1; MG/1
1 TABLET ORAL DAILY
Qty: 30 TABLET | Refills: 5 | Status: SHIPPED | OUTPATIENT
Start: 2018-08-16 | End: 2019-02-13 | Stop reason: SDUPTHER

## 2018-08-16 RX ORDER — DULOXETIN HYDROCHLORIDE 60 MG/1
60 CAPSULE, DELAYED RELEASE ORAL DAILY
Qty: 30 CAPSULE | Refills: 2 | Status: SHIPPED | OUTPATIENT
Start: 2018-08-16 | End: 2018-09-25 | Stop reason: SDUPTHER

## 2018-08-16 RX ORDER — METOPROLOL SUCCINATE 50 MG/1
50 TABLET, EXTENDED RELEASE ORAL DAILY
Qty: 30 TABLET | Refills: 5 | Status: SHIPPED | OUTPATIENT
Start: 2018-08-16 | End: 2019-04-05 | Stop reason: SDUPTHER

## 2018-08-16 NOTE — PATIENT INSTRUCTIONS

## 2018-08-16 NOTE — PROGRESS NOTES
Assessment/Plan:    Benign essential hypertension  Restart lisinopril and metoprolol    Major depressive disorder with current active episode  Start cymbalta to combat depression and anxiety  This will also help her neuropathy symptoms  We discussed potential side effects  No hx of liver disease  Weight gain  Will add wellbutrin to combat weight gain and nicotine dependence  Diagnoses and all orders for this visit:    Moderate episode of recurrent major depressive disorder ([place] [address] )    Visit for screening mammogram  -     Mammo screening bilateral w 3d & cad; Future    Benign essential HTN    Screening for thyroid disorder  -     TSH, 3rd generation; Future    Screening, lipid  -     Lipid Panel with Direct LDL reflex; Future    Screening for diabetes mellitus  -     Comprehensive metabolic panel; Future    Screening for deficiency anemia  -     CBC and differential; Future    Vitamin D deficiency  -     Vitamin D 25 hydroxy; Future    Generalized anxiety disorder  -     DULoxetine (CYMBALTA) 60 mg delayed release capsule; Take 1 capsule (60 mg total) by mouth daily  -     ALPRAZolam (XANAX) 1 mg tablet; Take 1 tablet (1 mg total) by mouth daily at bedtime as needed for anxiety    PVC (premature ventricular contraction)  -     metoprolol succinate (TOPROL-XL) 50 mg 24 hr tablet; Take 1 tablet (50 mg total) by mouth daily    Benign essential hypertension  -     lisinopril-hydrochlorothiazide (PRINZIDE,ZESTORETIC) 10-12 5 MG per tablet; Take 1 tablet by mouth daily    Smoking  -     buPROPion (WELLBUTRIN XL) 150 mg 24 hr tablet; Take 1 tablet (150 mg total) by mouth daily    Weight gain  -     buPROPion (WELLBUTRIN XL) 150 mg 24 hr tablet; Take 1 tablet (150 mg total) by mouth daily    Chronic obstructive pulmonary disease, unspecified COPD type ([place] [address] )  -     fluticasone-vilanterol (BREO ELLIPTA) 100-25 mcg/inh inhaler; Inhale 1 puff daily Rinse mouth after use      Numbness and tingling          Subjective: Patient ID: Alejandro Wick is a 37 y o  female  Pt presents to establish care as a transfer from [facility]  PMHx significant for HTN, anxiety, hyperlipidemia, neuropathy and COPD  PSurgHx includes hysterectomy with single oophorectomy  Intolerance to prozac but no true drug allergies  Medications are noted in the chart  She is a current 2ppd smoker and has smoked since the age of 12  She denies alcohol or drug use  She works FT at [employer]  She is ,   Both parents are living, her mother has diabetes and DDD and her father has bladder CA  She has a brother with HTN  She is due for a mammogram and labs  She complains of ongoing neuropathy in her thighs  She has tried gabapentin and robaxin without much benefit  She also complains of ongoing anxiety and depression  Her energy level and motivation are decreased  She has some episodes of irritability  She has tried effexor, prozac, zoloft in the past without significant improvement  She is concerned about her weight and desires something to help lose weight  She feels this will also improve her mood and leg pain  She is interested in quitting smoking as well  The following portions of the patient's history were reviewed and updated as appropriate:   She  has a past medical history of Depression; H/O: hysterectomy; Hypertension; Obesity; and PVC (premature ventricular contraction)    She   Patient Active Problem List    Diagnosis Date Noted    Major depressive disorder with current active episode 2018    Numbness and tingling 2018    Screening for cardiovascular condition 2018    Vitamin D deficiency 2018    Screening for breast cancer 2018    Smoking 2018    Weight gain 2018    Benign essential hypertension 2017    Dyslipidemia 2016    Generalized anxiety disorder 2015    PVC (premature ventricular contraction) 2015     She  has a past surgical history that includes Hysterectomy and Tumor removal   Her family history includes Diabetes in her mother  She  reports that she has been smoking E-Cigarettes  She has never used smokeless tobacco  She reports that she drinks alcohol  She reports that she does not use drugs  Current Outpatient Prescriptions   Medication Sig Dispense Refill    gabapentin (NEURONTIN) 100 mg capsule Take 1 capsule (100 mg total) by mouth 2 (two) times a day 60 capsule 0    albuterol (VENTOLIN HFA) 90 mcg/act inhaler every 4 (four) hours      ALPRAZolam (XANAX) 1 mg tablet Take 1 tablet (1 mg total) by mouth daily at bedtime as needed for anxiety 30 tablet 0    buPROPion (WELLBUTRIN XL) 150 mg 24 hr tablet Take 1 tablet (150 mg total) by mouth daily 30 tablet 3    DULoxetine (CYMBALTA) 60 mg delayed release capsule Take 1 capsule (60 mg total) by mouth daily 30 capsule 2    fluticasone-vilanterol (BREO ELLIPTA) 100-25 mcg/inh inhaler Inhale 1 puff daily Rinse mouth after use  1 Inhaler 2    lisinopril-hydrochlorothiazide (PRINZIDE,ZESTORETIC) 10-12 5 MG per tablet Take 1 tablet by mouth daily 30 tablet 5    metoprolol succinate (TOPROL-XL) 50 mg 24 hr tablet Take 1 tablet (50 mg total) by mouth daily 30 tablet 5     No current facility-administered medications for this visit        Current Outpatient Prescriptions on File Prior to Visit   Medication Sig    gabapentin (NEURONTIN) 100 mg capsule Take 1 capsule (100 mg total) by mouth 2 (two) times a day    albuterol (VENTOLIN HFA) 90 mcg/act inhaler every 4 (four) hours    [DISCONTINUED] cholecalciferol (VITAMIN D3) 1,000 units tablet Take 1,000 Units by mouth daily    [DISCONTINUED] fluticasone furoate-vilanterol (BREO ELLIPTA) Breo Ellipta 100 mcg-25 mcg/dose powder for inhalation    [DISCONTINUED] lisinopril-hydrochlorothiazide (PRINZIDE,ZESTORETIC) 10-12 5 MG per tablet lisinopril 10 mg-hydrochlorothiazide 12 5 mg tablet    [DISCONTINUED] methocarbamol (ROBAXIN) 500 mg tablet Take one tablet by mouth every 12 hours as needed (Patient not taking: Reported on 8/16/2018 )    [DISCONTINUED] metoprolol succinate (TOPROL-XL) 50 mg 24 hr tablet Take 50 mg by mouth daily    [DISCONTINUED] nicotine (NICODERM CQ) 21 mg/24 hr TD 24 hr patch Place 1 patch on the skin every 24 hours (Patient not taking: Reported on 8/16/2018 )    [DISCONTINUED] ondansetron (ZOFRAN-ODT) 4 mg disintegrating tablet Take 1 tablet (4 mg total) by mouth every 6 (six) hours as needed for nausea or vomiting (Patient not taking: Reported on 8/16/2018 )    [DISCONTINUED] terbinafine (LamISIL) 250 mg tablet Take 250 mg by mouth daily     No current facility-administered medications on file prior to visit  She is allergic to fluoxetine       Review of Systems   Constitutional: Negative for chills and fever  HENT: Negative for congestion, ear pain, hearing loss, postnasal drip, rhinorrhea, sinus pain, sinus pressure, sore throat and trouble swallowing  Eyes: Negative for pain and visual disturbance  Respiratory: Negative for cough, chest tightness, shortness of breath and wheezing  Cardiovascular: Negative  Negative for chest pain, palpitations and leg swelling  Gastrointestinal: Negative for abdominal pain, blood in stool, constipation, diarrhea, nausea and vomiting  Endocrine: Negative for cold intolerance, heat intolerance, polydipsia, polyphagia and polyuria  Genitourinary: Negative for difficulty urinating, dysuria, flank pain and urgency  Musculoskeletal: Positive for myalgias  Negative for arthralgias, back pain and gait problem  Skin: Negative for rash  Allergic/Immunologic: Negative  Neurological: Positive for numbness  Negative for dizziness, weakness, light-headedness and headaches  Hematological: Negative  Psychiatric/Behavioral: Positive for dysphoric mood  Negative for behavioral problems and sleep disturbance  The patient is nervous/anxious  Objective:      /86 (BP Location: Left arm, Patient Position: Sitting, Cuff Size: Adult)   Pulse (!) 110   Temp 99 4 °F (37 4 °C) (Tympanic)   Resp 18   Ht 5' 3 5" (1 613 m)   Wt 102 kg (224 lb)   SpO2 95%   BMI 39 06 kg/m²          Physical Exam   Constitutional: She is oriented to person, place, and time  She appears well-developed and well-nourished  No distress  HENT:   Head: Normocephalic and atraumatic  Right Ear: External ear normal    Left Ear: External ear normal    Nose: Nose normal    Mouth/Throat: Oropharynx is clear and moist  No oropharyngeal exudate  Eyes: Conjunctivae and EOM are normal  Pupils are equal, round, and reactive to light  Right eye exhibits no discharge  Left eye exhibits no discharge  No scleral icterus  Neck: Normal range of motion  Neck supple  No thyromegaly present  Cardiovascular: Normal rate, regular rhythm and normal heart sounds  Exam reveals no gallop and no friction rub  No murmur heard  Pulmonary/Chest: Effort normal and breath sounds normal  No respiratory distress  She has no wheezes  She has no rales  Abdominal: Soft  Bowel sounds are normal  She exhibits no distension  There is no tenderness  Musculoskeletal: Normal range of motion  She exhibits no edema, tenderness or deformity  Neurological: She is alert and oriented to person, place, and time  A sensory deficit is present  No cranial nerve deficit  Skin: Skin is warm and dry  She is not diaphoretic  Psychiatric: Her behavior is normal  Judgment and thought content normal  Her mood appears anxious  She exhibits a depressed mood

## 2018-08-16 NOTE — ASSESSMENT & PLAN NOTE
Start cymbalta to combat depression and anxiety  This will also help her neuropathy symptoms  We discussed potential side effects  No hx of liver disease

## 2018-08-22 ENCOUNTER — TELEPHONE (OUTPATIENT)
Dept: INTERNAL MEDICINE CLINIC | Facility: CLINIC | Age: 43
End: 2018-08-22

## 2018-08-22 NOTE — TELEPHONE ENCOUNTER
That is a common side effect and should resolve with continued use, is she willing to give it a little more time?

## 2018-08-22 NOTE — TELEPHONE ENCOUNTER
Pt called, on cymbalta for 5 days now, feels it is helping her but pt has profuse sweating, very bad, is there anything she could do for that?

## 2018-09-13 DIAGNOSIS — F41.1 GENERALIZED ANXIETY DISORDER: ICD-10-CM

## 2018-09-14 RX ORDER — ALPRAZOLAM 1 MG/1
1 TABLET ORAL
Qty: 30 TABLET | Refills: 0 | Status: SHIPPED | OUTPATIENT
Start: 2018-09-14 | End: 2018-11-06 | Stop reason: SDUPTHER

## 2018-09-25 ENCOUNTER — OFFICE VISIT (OUTPATIENT)
Dept: INTERNAL MEDICINE CLINIC | Facility: CLINIC | Age: 43
End: 2018-09-25
Payer: COMMERCIAL

## 2018-09-25 VITALS
HEIGHT: 64 IN | WEIGHT: 221.4 LBS | TEMPERATURE: 97.9 F | OXYGEN SATURATION: 98 % | BODY MASS INDEX: 37.8 KG/M2 | DIASTOLIC BLOOD PRESSURE: 80 MMHG | RESPIRATION RATE: 18 BRPM | HEART RATE: 77 BPM | SYSTOLIC BLOOD PRESSURE: 130 MMHG

## 2018-09-25 DIAGNOSIS — R20.2 NUMBNESS AND TINGLING: ICD-10-CM

## 2018-09-25 DIAGNOSIS — F17.200 SMOKING: ICD-10-CM

## 2018-09-25 DIAGNOSIS — F41.1 GENERALIZED ANXIETY DISORDER: ICD-10-CM

## 2018-09-25 DIAGNOSIS — R63.5 WEIGHT GAIN: ICD-10-CM

## 2018-09-25 DIAGNOSIS — I10 BENIGN ESSENTIAL HYPERTENSION: Primary | ICD-10-CM

## 2018-09-25 DIAGNOSIS — R20.0 NUMBNESS AND TINGLING: ICD-10-CM

## 2018-09-25 PROCEDURE — 3075F SYST BP GE 130 - 139MM HG: CPT | Performed by: PHYSICIAN ASSISTANT

## 2018-09-25 PROCEDURE — 3008F BODY MASS INDEX DOCD: CPT | Performed by: PHYSICIAN ASSISTANT

## 2018-09-25 PROCEDURE — 99214 OFFICE O/P EST MOD 30 MIN: CPT | Performed by: PHYSICIAN ASSISTANT

## 2018-09-25 PROCEDURE — 3079F DIAST BP 80-89 MM HG: CPT | Performed by: PHYSICIAN ASSISTANT

## 2018-09-25 RX ORDER — DULOXETIN HYDROCHLORIDE 60 MG/1
60 CAPSULE, DELAYED RELEASE ORAL DAILY
Qty: 60 CAPSULE | Refills: 3 | Status: SHIPPED | OUTPATIENT
Start: 2018-09-25 | End: 2018-10-01 | Stop reason: SDUPTHER

## 2018-09-25 RX ORDER — BUPROPION HYDROCHLORIDE 300 MG/1
300 TABLET ORAL DAILY
Qty: 30 TABLET | Refills: 5 | Status: SHIPPED | OUTPATIENT
Start: 2018-09-25 | End: 2018-10-01 | Stop reason: SDUPTHER

## 2018-09-25 RX ORDER — TERBINAFINE HYDROCHLORIDE 250 MG/1
250 TABLET ORAL
COMMUNITY
Start: 2018-09-10 | End: 2019-09-18 | Stop reason: ALTCHOICE

## 2018-09-25 NOTE — ASSESSMENT & PLAN NOTE
Will further increase cymbalta to see if this will provide resolution of symptoms as she has had robust results at a lesser dose

## 2018-09-25 NOTE — PROGRESS NOTES
Assessment/Plan:    Benign essential hypertension  Pts symptoms are stable with current regime  No changes at present  Smoking  Will increase wellbutrin to 300mg to combat nicotine dependence  If no benefit consider chantix  Numbness and tingling  Will further increase cymbalta to see if this will provide resolution of symptoms as she has had robust results at a lesser dose  Diagnoses and all orders for this visit:    Benign essential hypertension    Generalized anxiety disorder  -     DULoxetine (CYMBALTA) 60 mg delayed release capsule; Take 1 capsule (60 mg total) by mouth daily    Smoking  -     buPROPion (WELLBUTRIN XL) 300 mg 24 hr tablet; Take 1 tablet (300 mg total) by mouth daily    Weight gain  -     buPROPion (WELLBUTRIN XL) 300 mg 24 hr tablet; Take 1 tablet (300 mg total) by mouth daily    Numbness and tingling    Other orders  -     terbinafine (LamISIL) 250 mg tablet; 250 mg            Subjective:      Patient ID: Alexander Chaudhary is a 37 y o  female  Pt presents for 1 month follow up  Her BP is improved since being back on medications  She has tolerated cymbalta well and is noting a nice improvement in her neuropathy symptoms but they have not resolved  She is interested in trying a slightly higher dose  She is also tolerating wellbutrin well and is down 10lbs in 8 weeks  She has not been able to cut back on her smoking though  She is interested in trying a higher dose of this as well  The following portions of the patient's history were reviewed and updated as appropriate:   She  has a past medical history of Depression; H/O: hysterectomy; Hypertension; Obesity; and PVC (premature ventricular contraction)    She   Patient Active Problem List    Diagnosis Date Noted    Major depressive disorder with current active episode 08/16/2018    Numbness and tingling 07/11/2018    Screening for cardiovascular condition 05/04/2018    Vitamin D deficiency 05/04/2018    Screening for breast cancer 05/04/2018    Smoking 05/04/2018    Weight gain 05/04/2018    Benign essential hypertension 04/04/2017    Dyslipidemia 08/29/2016    Generalized anxiety disorder 12/30/2015    PVC (premature ventricular contraction) 12/30/2015     She  has a past surgical history that includes Hysterectomy and Tumor removal   Her family history includes Diabetes in her mother  She  reports that she has been smoking E-Cigarettes  She has never used smokeless tobacco  She reports that she drinks alcohol  She reports that she does not use drugs  Current Outpatient Prescriptions   Medication Sig Dispense Refill    albuterol (VENTOLIN HFA) 90 mcg/act inhaler every 4 (four) hours      ALPRAZolam (XANAX) 1 mg tablet Take 1 tablet (1 mg total) by mouth daily at bedtime as needed for anxiety 30 tablet 0    buPROPion (WELLBUTRIN XL) 300 mg 24 hr tablet Take 1 tablet (300 mg total) by mouth daily 30 tablet 5    DULoxetine (CYMBALTA) 60 mg delayed release capsule Take 1 capsule (60 mg total) by mouth daily 60 capsule 3    fluticasone-vilanterol (BREO ELLIPTA) 100-25 mcg/inh inhaler Inhale 1 puff daily Rinse mouth after use  1 Inhaler 2    lisinopril-hydrochlorothiazide (PRINZIDE,ZESTORETIC) 10-12 5 MG per tablet Take 1 tablet by mouth daily 30 tablet 5    metoprolol succinate (TOPROL-XL) 50 mg 24 hr tablet Take 1 tablet (50 mg total) by mouth daily 30 tablet 5    terbinafine (LamISIL) 250 mg tablet 250 mg         No current facility-administered medications for this visit  Current Outpatient Prescriptions on File Prior to Visit   Medication Sig    albuterol (VENTOLIN HFA) 90 mcg/act inhaler every 4 (four) hours    ALPRAZolam (XANAX) 1 mg tablet Take 1 tablet (1 mg total) by mouth daily at bedtime as needed for anxiety    fluticasone-vilanterol (BREO ELLIPTA) 100-25 mcg/inh inhaler Inhale 1 puff daily Rinse mouth after use      lisinopril-hydrochlorothiazide (PRINZIDE,ZESTORETIC) 10-12 5 MG per tablet Take 1 tablet by mouth daily    metoprolol succinate (TOPROL-XL) 50 mg 24 hr tablet Take 1 tablet (50 mg total) by mouth daily    [DISCONTINUED] buPROPion (WELLBUTRIN XL) 150 mg 24 hr tablet Take 1 tablet (150 mg total) by mouth daily    [DISCONTINUED] DULoxetine (CYMBALTA) 60 mg delayed release capsule Take 1 capsule (60 mg total) by mouth daily    [DISCONTINUED] gabapentin (NEURONTIN) 100 mg capsule Take 1 capsule (100 mg total) by mouth 2 (two) times a day (Patient not taking: Reported on 9/25/2018 )     No current facility-administered medications on file prior to visit  She is allergic to fluoxetine       Review of Systems   Constitutional: Negative for chills and fever  HENT: Negative for congestion, ear pain, hearing loss, postnasal drip, rhinorrhea, sinus pain, sinus pressure, sore throat and trouble swallowing  Eyes: Negative for pain and visual disturbance  Respiratory: Negative for cough, chest tightness, shortness of breath and wheezing  Cardiovascular: Negative  Negative for chest pain, palpitations and leg swelling  Gastrointestinal: Negative for abdominal pain, blood in stool, constipation, diarrhea, nausea and vomiting  Endocrine: Negative for cold intolerance, heat intolerance, polydipsia, polyphagia and polyuria  Genitourinary: Negative for difficulty urinating, dysuria, flank pain and urgency  Musculoskeletal: Negative for arthralgias, back pain, gait problem and myalgias  Skin: Negative for rash  Allergic/Immunologic: Negative  Neurological: Positive for numbness  Negative for dizziness, weakness, light-headedness and headaches  Hematological: Negative  Psychiatric/Behavioral: Negative for behavioral problems, dysphoric mood and sleep disturbance  The patient is not nervous/anxious            Objective:      /80 (BP Location: Left arm, Patient Position: Sitting, Cuff Size: Large)   Pulse 77   Temp 97 9 °F (36 6 °C)   Resp 18   Ht 5' 3 5" (1 613 m) Wt 100 kg (221 lb 6 4 oz)   SpO2 98%   BMI 38 60 kg/m²          Physical Exam   Constitutional: She is oriented to person, place, and time  She appears well-developed and well-nourished  No distress  HENT:   Head: Normocephalic and atraumatic  Right Ear: External ear normal    Left Ear: External ear normal    Nose: Nose normal    Mouth/Throat: Oropharynx is clear and moist  No oropharyngeal exudate  Eyes: Conjunctivae and EOM are normal  Pupils are equal, round, and reactive to light  Right eye exhibits no discharge  Left eye exhibits no discharge  No scleral icterus  Neck: Normal range of motion  Neck supple  No thyromegaly present  Cardiovascular: Normal rate, regular rhythm and normal heart sounds  Exam reveals no gallop and no friction rub  No murmur heard  Pulmonary/Chest: Effort normal and breath sounds normal  No respiratory distress  She has no wheezes  She has no rales  Abdominal: Soft  Bowel sounds are normal  She exhibits no distension  There is no tenderness  Musculoskeletal: Normal range of motion  She exhibits no edema, tenderness or deformity  Neurological: She is alert and oriented to person, place, and time  No cranial nerve deficit  Skin: Skin is warm and dry  She is not diaphoretic  Psychiatric: She has a normal mood and affect   Her behavior is normal  Judgment and thought content normal

## 2018-10-01 DIAGNOSIS — R63.5 WEIGHT GAIN: ICD-10-CM

## 2018-10-01 DIAGNOSIS — F41.1 GENERALIZED ANXIETY DISORDER: ICD-10-CM

## 2018-10-01 DIAGNOSIS — F17.200 SMOKING: ICD-10-CM

## 2018-10-01 RX ORDER — DULOXETIN HYDROCHLORIDE 60 MG/1
60 CAPSULE, DELAYED RELEASE ORAL DAILY
Qty: 60 CAPSULE | Refills: 5 | Status: SHIPPED | OUTPATIENT
Start: 2018-10-01 | End: 2018-10-04 | Stop reason: SDUPTHER

## 2018-10-01 RX ORDER — BUPROPION HYDROCHLORIDE 300 MG/1
300 TABLET ORAL DAILY
Qty: 30 TABLET | Refills: 5 | Status: SHIPPED | OUTPATIENT
Start: 2018-10-01 | End: 2019-10-09 | Stop reason: SDUPTHER

## 2018-10-04 DIAGNOSIS — F41.1 GENERALIZED ANXIETY DISORDER: ICD-10-CM

## 2018-10-04 RX ORDER — DULOXETIN HYDROCHLORIDE 60 MG/1
120 CAPSULE, DELAYED RELEASE ORAL DAILY
Qty: 60 CAPSULE | Refills: 0 | Status: SHIPPED | OUTPATIENT
Start: 2018-10-04 | End: 2018-11-06 | Stop reason: SDUPTHER

## 2018-11-06 DIAGNOSIS — F41.1 GENERALIZED ANXIETY DISORDER: ICD-10-CM

## 2018-11-06 RX ORDER — ALPRAZOLAM 1 MG/1
1 TABLET ORAL
Qty: 30 TABLET | Refills: 0 | Status: SHIPPED | OUTPATIENT
Start: 2018-11-06 | End: 2019-01-03 | Stop reason: SDUPTHER

## 2018-11-06 RX ORDER — DULOXETIN HYDROCHLORIDE 60 MG/1
120 CAPSULE, DELAYED RELEASE ORAL 2 TIMES DAILY
Qty: 120 CAPSULE | Refills: 5 | Status: SHIPPED | OUTPATIENT
Start: 2018-11-06 | End: 2018-11-20 | Stop reason: SDUPTHER

## 2018-11-20 DIAGNOSIS — F41.1 GENERALIZED ANXIETY DISORDER: ICD-10-CM

## 2018-11-20 RX ORDER — DULOXETIN HYDROCHLORIDE 60 MG/1
60 CAPSULE, DELAYED RELEASE ORAL 2 TIMES DAILY
Qty: 60 CAPSULE | Refills: 5 | Status: SHIPPED | OUTPATIENT
Start: 2018-11-20 | End: 2019-05-11 | Stop reason: SDUPTHER

## 2019-01-03 DIAGNOSIS — F41.1 GENERALIZED ANXIETY DISORDER: ICD-10-CM

## 2019-01-04 RX ORDER — ALPRAZOLAM 1 MG/1
TABLET ORAL
Qty: 30 TABLET | Refills: 0 | Status: SHIPPED | OUTPATIENT
Start: 2019-01-04 | End: 2019-02-13 | Stop reason: SDUPTHER

## 2019-02-13 DIAGNOSIS — I10 BENIGN ESSENTIAL HYPERTENSION: ICD-10-CM

## 2019-02-13 DIAGNOSIS — F41.1 GENERALIZED ANXIETY DISORDER: ICD-10-CM

## 2019-02-13 RX ORDER — ALPRAZOLAM 1 MG/1
TABLET ORAL
Qty: 30 TABLET | Refills: 0 | Status: SHIPPED | OUTPATIENT
Start: 2019-02-13 | End: 2019-03-19 | Stop reason: SDUPTHER

## 2019-02-13 RX ORDER — LISINOPRIL AND HYDROCHLOROTHIAZIDE 12.5; 1 MG/1; MG/1
TABLET ORAL
Qty: 30 TABLET | Refills: 4 | Status: SHIPPED | OUTPATIENT
Start: 2019-02-13 | End: 2019-07-08 | Stop reason: SDUPTHER

## 2019-03-19 DIAGNOSIS — F41.1 GENERALIZED ANXIETY DISORDER: ICD-10-CM

## 2019-03-20 RX ORDER — ALPRAZOLAM 1 MG/1
1 TABLET ORAL
Qty: 30 TABLET | Refills: 0 | Status: SHIPPED | OUTPATIENT
Start: 2019-03-20 | End: 2019-04-20 | Stop reason: SDUPTHER

## 2019-04-05 DIAGNOSIS — I49.3 PVC (PREMATURE VENTRICULAR CONTRACTION): ICD-10-CM

## 2019-04-05 RX ORDER — METOPROLOL SUCCINATE 50 MG/1
TABLET, EXTENDED RELEASE ORAL
Qty: 30 TABLET | Refills: 5 | Status: SHIPPED | OUTPATIENT
Start: 2019-04-05 | End: 2019-10-05 | Stop reason: SDUPTHER

## 2019-04-20 DIAGNOSIS — F41.1 GENERALIZED ANXIETY DISORDER: ICD-10-CM

## 2019-04-22 RX ORDER — ALPRAZOLAM 1 MG/1
1 TABLET ORAL
Qty: 30 TABLET | Refills: 0 | Status: SHIPPED | OUTPATIENT
Start: 2019-04-22 | End: 2019-06-10 | Stop reason: SDUPTHER

## 2019-05-10 RX ORDER — FLUOXETINE 10 MG/1
CAPSULE ORAL
COMMUNITY
End: 2019-05-11 | Stop reason: SINTOL

## 2019-05-11 ENCOUNTER — OFFICE VISIT (OUTPATIENT)
Dept: INTERNAL MEDICINE CLINIC | Facility: CLINIC | Age: 44
End: 2019-05-11
Payer: COMMERCIAL

## 2019-05-11 VITALS
WEIGHT: 222.2 LBS | HEIGHT: 64 IN | SYSTOLIC BLOOD PRESSURE: 124 MMHG | RESPIRATION RATE: 18 BRPM | OXYGEN SATURATION: 95 % | DIASTOLIC BLOOD PRESSURE: 80 MMHG | TEMPERATURE: 98.2 F | HEART RATE: 84 BPM | BODY MASS INDEX: 37.94 KG/M2

## 2019-05-11 DIAGNOSIS — R20.2 NUMBNESS AND TINGLING: ICD-10-CM

## 2019-05-11 DIAGNOSIS — F17.200 SMOKING: ICD-10-CM

## 2019-05-11 DIAGNOSIS — F51.01 PRIMARY INSOMNIA: ICD-10-CM

## 2019-05-11 DIAGNOSIS — Z13.0 SCREENING FOR DEFICIENCY ANEMIA: ICD-10-CM

## 2019-05-11 DIAGNOSIS — B35.1 TINEA UNGUIUM: ICD-10-CM

## 2019-05-11 DIAGNOSIS — R20.0 NUMBNESS AND TINGLING: ICD-10-CM

## 2019-05-11 DIAGNOSIS — I10 BENIGN ESSENTIAL HYPERTENSION: ICD-10-CM

## 2019-05-11 DIAGNOSIS — Z13.220 SCREENING, LIPID: ICD-10-CM

## 2019-05-11 DIAGNOSIS — E55.9 VITAMIN D DEFICIENCY: ICD-10-CM

## 2019-05-11 DIAGNOSIS — Z12.39 SCREENING FOR BREAST CANCER: ICD-10-CM

## 2019-05-11 DIAGNOSIS — F41.1 GENERALIZED ANXIETY DISORDER: Primary | ICD-10-CM

## 2019-05-11 DIAGNOSIS — Z13.1 SCREENING FOR DIABETES MELLITUS: ICD-10-CM

## 2019-05-11 DIAGNOSIS — Z13.29 SCREENING FOR THYROID DISORDER: ICD-10-CM

## 2019-05-11 DIAGNOSIS — I49.3 PVC (PREMATURE VENTRICULAR CONTRACTION): ICD-10-CM

## 2019-05-11 PROCEDURE — 99214 OFFICE O/P EST MOD 30 MIN: CPT | Performed by: PHYSICIAN ASSISTANT

## 2019-05-11 PROCEDURE — 3079F DIAST BP 80-89 MM HG: CPT | Performed by: PHYSICIAN ASSISTANT

## 2019-05-11 PROCEDURE — 3074F SYST BP LT 130 MM HG: CPT | Performed by: PHYSICIAN ASSISTANT

## 2019-05-11 RX ORDER — DULOXETIN HYDROCHLORIDE 60 MG/1
60 CAPSULE, DELAYED RELEASE ORAL 2 TIMES DAILY
Qty: 60 CAPSULE | Refills: 5 | Status: SHIPPED | OUTPATIENT
Start: 2019-05-11 | End: 2019-11-12 | Stop reason: SDUPTHER

## 2019-05-11 RX ORDER — TRAZODONE HYDROCHLORIDE 50 MG/1
50-100 TABLET ORAL
Qty: 60 TABLET | Refills: 2 | Status: SHIPPED | OUTPATIENT
Start: 2019-05-11 | End: 2019-06-15 | Stop reason: SDUPTHER

## 2019-05-11 RX ORDER — ALBUTEROL SULFATE 90 UG/1
2 AEROSOL, METERED RESPIRATORY (INHALATION) EVERY 4 HOURS PRN
Qty: 1 INHALER | Refills: 5 | Status: SHIPPED | OUTPATIENT
Start: 2019-05-11

## 2019-05-13 PROBLEM — F51.01 PRIMARY INSOMNIA: Status: ACTIVE | Noted: 2019-05-13

## 2019-06-10 DIAGNOSIS — F41.1 GENERALIZED ANXIETY DISORDER: ICD-10-CM

## 2019-06-10 DIAGNOSIS — B35.1 TINEA UNGUIUM: ICD-10-CM

## 2019-06-11 RX ORDER — ALPRAZOLAM 1 MG/1
1 TABLET ORAL
Qty: 30 TABLET | Refills: 0 | OUTPATIENT
Start: 2019-06-11

## 2019-06-11 RX ORDER — ALPRAZOLAM 1 MG/1
1 TABLET ORAL
Qty: 30 TABLET | Refills: 0 | Status: SHIPPED | OUTPATIENT
Start: 2019-06-11 | End: 2019-07-08 | Stop reason: SDUPTHER

## 2019-06-15 ENCOUNTER — OFFICE VISIT (OUTPATIENT)
Dept: INTERNAL MEDICINE CLINIC | Facility: CLINIC | Age: 44
End: 2019-06-15
Payer: COMMERCIAL

## 2019-06-15 VITALS
SYSTOLIC BLOOD PRESSURE: 144 MMHG | TEMPERATURE: 97.3 F | DIASTOLIC BLOOD PRESSURE: 88 MMHG | HEART RATE: 114 BPM | HEIGHT: 64 IN | OXYGEN SATURATION: 98 % | RESPIRATION RATE: 18 BRPM | BODY MASS INDEX: 38.14 KG/M2 | WEIGHT: 223.4 LBS

## 2019-06-15 DIAGNOSIS — Z13.220 SCREENING, LIPID: ICD-10-CM

## 2019-06-15 DIAGNOSIS — Z13.29 SCREENING FOR THYROID DISORDER: ICD-10-CM

## 2019-06-15 DIAGNOSIS — F41.1 GENERALIZED ANXIETY DISORDER: ICD-10-CM

## 2019-06-15 DIAGNOSIS — Z13.1 SCREENING FOR DIABETES MELLITUS: ICD-10-CM

## 2019-06-15 DIAGNOSIS — I10 BENIGN ESSENTIAL HYPERTENSION: ICD-10-CM

## 2019-06-15 DIAGNOSIS — E55.9 VITAMIN D DEFICIENCY: ICD-10-CM

## 2019-06-15 DIAGNOSIS — Z01.419 ENCOUNTER FOR GYNECOLOGICAL EXAMINATION WITHOUT ABNORMAL FINDING: Primary | ICD-10-CM

## 2019-06-15 DIAGNOSIS — F51.01 PRIMARY INSOMNIA: ICD-10-CM

## 2019-06-15 DIAGNOSIS — Z12.39 SCREENING FOR BREAST CANCER: ICD-10-CM

## 2019-06-15 DIAGNOSIS — Z13.0 SCREENING FOR DEFICIENCY ANEMIA: ICD-10-CM

## 2019-06-15 DIAGNOSIS — R20.2 NUMBNESS AND TINGLING: ICD-10-CM

## 2019-06-15 DIAGNOSIS — R20.0 NUMBNESS AND TINGLING: ICD-10-CM

## 2019-06-15 PROCEDURE — S0612 ANNUAL GYNECOLOGICAL EXAMINA: HCPCS | Performed by: PHYSICIAN ASSISTANT

## 2019-06-15 PROCEDURE — G0145 SCR C/V CYTO,THINLAYER,RESCR: HCPCS | Performed by: PHYSICIAN ASSISTANT

## 2019-06-15 PROCEDURE — 99213 OFFICE O/P EST LOW 20 MIN: CPT | Performed by: PHYSICIAN ASSISTANT

## 2019-06-15 PROCEDURE — 3008F BODY MASS INDEX DOCD: CPT | Performed by: PHYSICIAN ASSISTANT

## 2019-06-15 RX ORDER — TRAZODONE HYDROCHLORIDE 150 MG/1
150 TABLET ORAL
Qty: 30 TABLET | Refills: 2 | Status: SHIPPED | OUTPATIENT
Start: 2019-06-15 | End: 2019-11-12 | Stop reason: SDUPTHER

## 2019-06-15 RX ORDER — TRAZODONE HYDROCHLORIDE 50 MG/1
50-100 TABLET ORAL
Qty: 60 TABLET | Refills: 2 | Status: SHIPPED | OUTPATIENT
Start: 2019-06-15 | End: 2019-06-15 | Stop reason: SDUPTHER

## 2019-06-21 LAB
LAB AP GYN PRIMARY INTERPRETATION: NORMAL
Lab: NORMAL
PATH INTERP SPEC-IMP: NORMAL

## 2019-06-24 DIAGNOSIS — N76.0 BV (BACTERIAL VAGINOSIS): Primary | ICD-10-CM

## 2019-06-24 DIAGNOSIS — B96.89 BV (BACTERIAL VAGINOSIS): Primary | ICD-10-CM

## 2019-06-24 RX ORDER — METRONIDAZOLE 500 MG/1
500 TABLET ORAL EVERY 8 HOURS SCHEDULED
Qty: 30 TABLET | Refills: 0 | Status: SHIPPED | OUTPATIENT
Start: 2019-06-24 | End: 2019-07-04

## 2019-07-08 DIAGNOSIS — F41.1 GENERALIZED ANXIETY DISORDER: ICD-10-CM

## 2019-07-08 DIAGNOSIS — I10 BENIGN ESSENTIAL HYPERTENSION: ICD-10-CM

## 2019-07-09 RX ORDER — LISINOPRIL AND HYDROCHLOROTHIAZIDE 12.5; 1 MG/1; MG/1
1 TABLET ORAL DAILY
Qty: 30 TABLET | Refills: 5 | Status: SHIPPED | OUTPATIENT
Start: 2019-07-09 | End: 2020-01-29

## 2019-07-09 RX ORDER — ALPRAZOLAM 1 MG/1
1 TABLET ORAL
Qty: 30 TABLET | Refills: 0 | Status: SHIPPED | OUTPATIENT
Start: 2019-07-09 | End: 2019-07-12 | Stop reason: SDUPTHER

## 2019-07-09 RX ORDER — ALPRAZOLAM 1 MG/1
1 TABLET ORAL
Qty: 30 TABLET | Refills: 0 | Status: SHIPPED | OUTPATIENT
Start: 2019-07-09 | End: 2019-08-13

## 2019-07-10 DIAGNOSIS — F41.1 GENERALIZED ANXIETY DISORDER: ICD-10-CM

## 2019-07-10 DIAGNOSIS — I10 BENIGN ESSENTIAL HYPERTENSION: ICD-10-CM

## 2019-07-10 RX ORDER — LISINOPRIL AND HYDROCHLOROTHIAZIDE 12.5; 1 MG/1; MG/1
TABLET ORAL
Qty: 30 TABLET | Refills: 4 | Status: SHIPPED | OUTPATIENT
Start: 2019-07-10 | End: 2019-09-18 | Stop reason: SDUPTHER

## 2019-07-10 RX ORDER — ALPRAZOLAM 1 MG/1
1 TABLET ORAL
Qty: 30 TABLET | Refills: 0 | Status: SHIPPED | OUTPATIENT
Start: 2019-07-10 | End: 2019-08-13

## 2019-07-12 DIAGNOSIS — F41.1 GENERALIZED ANXIETY DISORDER: ICD-10-CM

## 2019-07-12 RX ORDER — ALPRAZOLAM 1 MG/1
1 TABLET ORAL
Qty: 30 TABLET | Refills: 0 | Status: SHIPPED | OUTPATIENT
Start: 2019-07-12 | End: 2019-08-13 | Stop reason: SDUPTHER

## 2019-07-12 RX ORDER — ALPRAZOLAM 1 MG/1
1 TABLET ORAL
Qty: 30 TABLET | Refills: 0 | OUTPATIENT
Start: 2019-07-12

## 2019-08-13 DIAGNOSIS — F41.1 GENERALIZED ANXIETY DISORDER: ICD-10-CM

## 2019-08-14 DIAGNOSIS — F41.1 GENERALIZED ANXIETY DISORDER: ICD-10-CM

## 2019-08-14 RX ORDER — ALPRAZOLAM 1 MG/1
1 TABLET ORAL
Qty: 30 TABLET | Refills: 0 | Status: SHIPPED | OUTPATIENT
Start: 2019-08-14 | End: 2019-09-13 | Stop reason: SDUPTHER

## 2019-08-14 RX ORDER — ALPRAZOLAM 1 MG/1
1 TABLET ORAL
Qty: 30 TABLET | Refills: 0 | OUTPATIENT
Start: 2019-08-14

## 2019-08-15 ENCOUNTER — TELEPHONE (OUTPATIENT)
Dept: INTERNAL MEDICINE CLINIC | Facility: CLINIC | Age: 44
End: 2019-08-15

## 2019-08-15 DIAGNOSIS — F41.1 GENERALIZED ANXIETY DISORDER: Primary | ICD-10-CM

## 2019-08-15 RX ORDER — ARIPIPRAZOLE 5 MG/1
5 TABLET ORAL DAILY
Qty: 30 TABLET | Refills: 2 | Status: SHIPPED | OUTPATIENT
Start: 2019-08-15 | End: 2019-09-18 | Stop reason: SDUPTHER

## 2019-08-15 NOTE — TELEPHONE ENCOUNTER
Pt mother called office for pt stating May Conrad is depressed and medication she is on is not strong enough  They would like to know if you would send something stronger in for her? I did offer appt, but she needs after work or a weekend  You next Saturday appt is 9/7/2019 and nothing after her work hours  Please advise so we can call her mother back   Thanks

## 2019-09-12 ENCOUNTER — HOSPITAL ENCOUNTER (EMERGENCY)
Facility: HOSPITAL | Age: 44
Discharge: HOME | End: 2019-09-12
Attending: FAMILY MEDICINE | Admitting: FAMILY MEDICINE
Payer: COMMERCIAL

## 2019-09-12 ENCOUNTER — APPOINTMENT (EMERGENCY)
Dept: CT IMAGING | Facility: HOSPITAL | Age: 44
End: 2019-09-12
Payer: COMMERCIAL

## 2019-09-12 VITALS
WEIGHT: 200 LBS | OXYGEN SATURATION: 97 % | HEART RATE: 77 BPM | HEIGHT: 63 IN | SYSTOLIC BLOOD PRESSURE: 148 MMHG | DIASTOLIC BLOOD PRESSURE: 80 MMHG | BODY MASS INDEX: 35.44 KG/M2 | RESPIRATION RATE: 18 BRPM | TEMPERATURE: 98.4 F

## 2019-09-12 DIAGNOSIS — N94.9 ADNEXAL CYST: ICD-10-CM

## 2019-09-12 DIAGNOSIS — J43.9 EMPHYSEMA OF LUNG (HCC): ICD-10-CM

## 2019-09-12 DIAGNOSIS — N10 ACUTE PYELONEPHRITIS: Primary | ICD-10-CM

## 2019-09-12 DIAGNOSIS — R31.9 HEMATURIA: ICD-10-CM

## 2019-09-12 DIAGNOSIS — I10 HYPERTENSION: ICD-10-CM

## 2019-09-12 DIAGNOSIS — K57.90 DIVERTICULOSIS: ICD-10-CM

## 2019-09-12 LAB
ALBUMIN SERPL BCP-MCNC: 4.3 G/DL (ref 3.5–5.7)
ALP SERPL-CCNC: 65 U/L (ref 40–150)
ALT SERPL W P-5'-P-CCNC: 20 U/L (ref 7–52)
ANION GAP SERPL CALCULATED.3IONS-SCNC: 6 MMOL/L (ref 4–13)
APTT PPP: 36 SECONDS (ref 23–37)
AST SERPL W P-5'-P-CCNC: 13 U/L (ref 13–39)
BACTERIA UR QL AUTO: ABNORMAL /HPF
BASOPHILS # BLD AUTO: 0.1 THOUSANDS/ΜL (ref 0–0.1)
BASOPHILS NFR BLD AUTO: 0 % (ref 0–2)
BILIRUB SERPL-MCNC: 0.3 MG/DL (ref 0.2–1)
BILIRUB UR QL STRIP: NEGATIVE
BUN SERPL-MCNC: 17 MG/DL (ref 7–25)
CALCIUM SERPL-MCNC: 9.6 MG/DL (ref 8.6–10.5)
CHLORIDE SERPL-SCNC: 103 MMOL/L (ref 98–107)
CLARITY UR: CLEAR
CO2 SERPL-SCNC: 28 MMOL/L (ref 21–31)
COLOR UR: ABNORMAL
CREAT SERPL-MCNC: 0.77 MG/DL (ref 0.6–1.2)
EOSINOPHIL # BLD AUTO: 0.1 THOUSAND/ΜL (ref 0–0.61)
EOSINOPHIL NFR BLD AUTO: 1 % (ref 0–5)
ERYTHROCYTE [DISTWIDTH] IN BLOOD BY AUTOMATED COUNT: 13.2 % (ref 11.5–14.5)
GFR SERPL CREATININE-BSD FRML MDRD: 94 ML/MIN/1.73SQ M
GLUCOSE SERPL-MCNC: 103 MG/DL (ref 65–99)
GLUCOSE UR STRIP-MCNC: NEGATIVE MG/DL
HCT VFR BLD AUTO: 43.3 % (ref 42–47)
HGB BLD-MCNC: 14.8 G/DL (ref 12–16)
HGB UR QL STRIP.AUTO: ABNORMAL
INR PPP: 0.9 (ref 0.9–1.5)
KETONES UR STRIP-MCNC: NEGATIVE MG/DL
LEUKOCYTE ESTERASE UR QL STRIP: ABNORMAL
LYMPHOCYTES # BLD AUTO: 2.8 THOUSANDS/ΜL (ref 0.6–4.47)
LYMPHOCYTES NFR BLD AUTO: 18 % (ref 21–51)
MCH RBC QN AUTO: 32.7 PG (ref 26–34)
MCHC RBC AUTO-ENTMCNC: 34.3 G/DL (ref 31–37)
MCV RBC AUTO: 96 FL (ref 81–99)
MONOCYTES # BLD AUTO: 0.8 THOUSAND/ΜL (ref 0.17–1.22)
MONOCYTES NFR BLD AUTO: 5 % (ref 2–12)
NEUTROPHILS # BLD AUTO: 12 THOUSANDS/ΜL (ref 1.4–6.5)
NEUTS SEG NFR BLD AUTO: 76 % (ref 42–75)
NITRITE UR QL STRIP: NEGATIVE
NON-SQ EPI CELLS URNS QL MICRO: ABNORMAL /HPF
PH UR STRIP.AUTO: 5.5 [PH]
PLATELET # BLD AUTO: 309 THOUSANDS/UL (ref 149–390)
PMV BLD AUTO: 8.5 FL (ref 8.6–11.7)
POTASSIUM SERPL-SCNC: 3.8 MMOL/L (ref 3.5–5.5)
PROT SERPL-MCNC: 7 G/DL (ref 6.4–8.9)
PROT UR STRIP-MCNC: NEGATIVE MG/DL
PROTHROMBIN TIME: 10.4 SECONDS (ref 10.2–13)
RBC # BLD AUTO: 4.53 MILLION/UL (ref 3.9–5.2)
RBC #/AREA URNS AUTO: ABNORMAL /HPF
SODIUM SERPL-SCNC: 137 MMOL/L (ref 134–143)
SP GR UR STRIP.AUTO: 1.01 (ref 1–1.03)
UROBILINOGEN UR QL STRIP.AUTO: 0.2 E.U./DL
WBC # BLD AUTO: 15.8 THOUSAND/UL (ref 4.8–10.8)
WBC #/AREA URNS AUTO: ABNORMAL /HPF

## 2019-09-12 PROCEDURE — 81001 URINALYSIS AUTO W/SCOPE: CPT | Performed by: FAMILY MEDICINE

## 2019-09-12 PROCEDURE — 96361 HYDRATE IV INFUSION ADD-ON: CPT

## 2019-09-12 PROCEDURE — 80053 COMPREHEN METABOLIC PANEL: CPT | Performed by: PHYSICIAN ASSISTANT

## 2019-09-12 PROCEDURE — 36415 COLL VENOUS BLD VENIPUNCTURE: CPT | Performed by: PHYSICIAN ASSISTANT

## 2019-09-12 PROCEDURE — 99284 EMERGENCY DEPT VISIT MOD MDM: CPT

## 2019-09-12 PROCEDURE — 85730 THROMBOPLASTIN TIME PARTIAL: CPT | Performed by: PHYSICIAN ASSISTANT

## 2019-09-12 PROCEDURE — 96365 THER/PROPH/DIAG IV INF INIT: CPT

## 2019-09-12 PROCEDURE — 85610 PROTHROMBIN TIME: CPT | Performed by: PHYSICIAN ASSISTANT

## 2019-09-12 PROCEDURE — 74176 CT ABD & PELVIS W/O CONTRAST: CPT

## 2019-09-12 PROCEDURE — 85025 COMPLETE CBC W/AUTO DIFF WBC: CPT | Performed by: PHYSICIAN ASSISTANT

## 2019-09-12 RX ORDER — CEFTRIAXONE 1 G/50ML
1000 INJECTION, SOLUTION INTRAVENOUS ONCE
Status: COMPLETED | OUTPATIENT
Start: 2019-09-12 | End: 2019-09-12

## 2019-09-12 RX ORDER — CEPHALEXIN 500 MG/1
500 CAPSULE ORAL EVERY 12 HOURS SCHEDULED
Qty: 20 CAPSULE | Refills: 0 | Status: SHIPPED | OUTPATIENT
Start: 2019-09-12 | End: 2019-09-22

## 2019-09-12 RX ORDER — ACETAMINOPHEN 325 MG/1
650 TABLET ORAL ONCE
Status: COMPLETED | OUTPATIENT
Start: 2019-09-12 | End: 2019-09-12

## 2019-09-12 RX ADMIN — SODIUM CHLORIDE 1000 ML: 0.9 INJECTION, SOLUTION INTRAVENOUS at 10:25

## 2019-09-12 RX ADMIN — ACETAMINOPHEN 650 MG: 325 TABLET ORAL at 09:27

## 2019-09-12 RX ADMIN — CEFTRIAXONE 1000 MG: 1 INJECTION, SOLUTION INTRAVENOUS at 10:25

## 2019-09-12 NOTE — ED PROVIDER NOTES
Exchange History  Chief Complaint   Patient presents with    Blood in Urine     symptoms x 3 weeks  70-year-old female presents emergency room with complaint of suprapubic pressure and lower back pain which she states has been intermittent for 3 weeks worsening however today and is accompanied by blood in her urine  Patient denies fevers chills nausea vomiting no recent trauma or injury  Patient is status post hysterectomy  No complaints of diarrhea or blood in stool or vaginal discharge  Patient tried to get in with her PCP but cannot get an appointment for over a week came to emergency room for evaluation  History provided by:  Patient   used: No    Difficulty Urinating   Pain quality:  Aching  Pain severity:  Mild  Onset quality:  Sudden  Duration:  3 weeks  Timing:  Intermittent  Progression:  Worsening  Chronicity:  New  Relieved by:  None tried  Worsened by:  Nothing  Ineffective treatments:  None tried  Urinary symptoms: frequent urination and hematuria    Urinary symptoms: no bladder incontinence    Associated symptoms: abdominal pain    Associated symptoms: no fever, no nausea, no vaginal discharge and no vomiting    Abdominal pain:     Location:  Suprapubic    Quality: fullness and pressure      Severity:  Mild    Onset quality:  Gradual    Duration:  3 weeks    Timing:  Intermittent    Progression:  Worsening    Chronicity:  New  Risk factors: no hx of pyelonephritis, not pregnant, no recurrent urinary tract infections and no single kidney          Allergies   Allergen Reactions    Fluoxetine Anxiety         Prior to Admission Medications   Prescriptions Last Dose Informant Patient Reported? Taking?    ALPRAZolam (XANAX) 1 mg tablet 9/12/2019 at Unknown time  No Yes   Sig: Take 1 tablet (1 mg total) by mouth daily at bedtime as needed for anxiety   ARIPiprazole (ABILIFY) 5 mg tablet 9/12/2019 at Unknown time  No Yes   Sig: Take 1 tablet (5 mg total) by mouth daily DULoxetine (CYMBALTA) 60 mg delayed release capsule 2019 at Unknown time  No Yes   Sig: Take 1 capsule (60 mg total) by mouth 2 (two) times a day   albuterol (VENTOLIN HFA) 90 mcg/act inhaler More than a month at Unknown time  No No   Sig: Inhale 2 puffs every 4 (four) hours as needed for wheezing   buPROPion (WELLBUTRIN XL) 300 mg 24 hr tablet 2019 at Unknown time  No Yes   Sig: Take 1 tablet (300 mg total) by mouth daily   ciclopirox (PENLAC) 8 % solution   No No   Sig: Apply topically daily at bedtime   fluticasone-vilanterol (BREO ELLIPTA) 100-25 mcg/inh inhaler More than a month at Unknown time  No No   Sig: Inhale 1 puff daily Rinse mouth after use  lisinopril-hydrochlorothiazide (PRINZIDE,ZESTORETIC) 10-12 5 MG per tablet   No No   Sig: Take 1 tablet by mouth daily   lisinopril-hydrochlorothiazide (PRINZIDE,ZESTORETIC) 10-12 5 MG per tablet 2019 at Unknown time  No Yes   Sig: TAKE 1 TABLET BY MOUTH EVERY DAY   metoprolol succinate (TOPROL-XL) 50 mg 24 hr tablet 2019 at Unknown time  No Yes   Sig: TAKE 1 TABLET BY MOUTH EVERY DAY   terbinafine (LamISIL) 250 mg tablet More than a month at Unknown time  Yes No   Si mg     traZODone (DESYREL) 150 mg tablet Past Month at Unknown time  No Yes   Sig: Take 1 tablet (150 mg total) by mouth daily at bedtime      Facility-Administered Medications: None       Past Medical History:   Diagnosis Date    Depression     Generalized anxiety disorder     H/O: hysterectomy     LAST ASSESSED 30 DEC 2015    Hypertension     Obesity     PVC (premature ventricular contraction)        Past Surgical History:   Procedure Laterality Date    HYSTERECTOMY      TUMOR REMOVAL         Family History   Problem Relation Age of Onset    Diabetes Mother      I have reviewed and agree with the history as documented      Social History     Tobacco Use    Smoking status: Current Every Day Smoker     Packs/day:  00     Types: E-Cigarettes, Cigarettes    Smokeless tobacco: Never Used   Substance Use Topics    Alcohol use: Yes     Comment: occasionally    Drug use: No        Review of Systems   Constitutional: Negative for chills, diaphoresis, fatigue and fever  Respiratory: Negative for cough, shortness of breath, wheezing and stridor  Cardiovascular: Negative for chest pain, palpitations and leg swelling  Gastrointestinal: Positive for abdominal pain  Negative for abdominal distention, blood in stool, constipation, diarrhea, nausea and vomiting  Genitourinary: Positive for dysuria and hematuria  Negative for difficulty urinating, frequency, urgency, vaginal bleeding and vaginal discharge  Musculoskeletal: Negative for gait problem, myalgias and neck pain  Skin: Negative for rash  Neurological: Negative for dizziness, syncope, weakness, light-headedness and headaches  All other systems reviewed and are negative  Physical Exam  Physical Exam   Constitutional: She is oriented to person, place, and time  She appears well-developed and well-nourished  HENT:   Head: Normocephalic and atraumatic  Eyes: Pupils are equal, round, and reactive to light  Conjunctivae and EOM are normal    Neck: Normal range of motion  Neck supple  No tracheal deviation present  Cardiovascular: Normal rate, regular rhythm, normal heart sounds and intact distal pulses  Pulmonary/Chest: Effort normal and breath sounds normal  No respiratory distress  She has no wheezes  Abdominal: Soft  Bowel sounds are normal  She exhibits no distension  There is tenderness in the suprapubic area  There is no CVA tenderness and negative Gaines's sign  Musculoskeletal: Normal range of motion  She exhibits no edema  Lumbar back: She exhibits tenderness and pain  She exhibits normal range of motion, no bony tenderness and no swelling  Back:    Neurological: She is alert and oriented to person, place, and time  Skin: Skin is warm and dry  No rash noted   No erythema  Nursing note and vitals reviewed        Vital Signs  ED Triage Vitals [09/12/19 0836]   Temperature Pulse Respirations Blood Pressure SpO2   98 4 °F (36 9 °C) 81 18 164/95 100 %      Temp Source Heart Rate Source Patient Position - Orthostatic VS BP Location FiO2 (%)   Temporal Monitor Sitting Left arm --      Pain Score       No Pain           Vitals:    09/12/19 0836   BP: 164/95   Pulse: 81   Patient Position - Orthostatic VS: Sitting         Visual Acuity      ED Medications  Medications   sodium chloride 0 9 % bolus 1,000 mL (1,000 mL Intravenous New Bag 9/12/19 1025)   acetaminophen (TYLENOL) tablet 650 mg (650 mg Oral Given 9/12/19 0927)   cefTRIAXone (ROCEPHIN) IVPB (premix) 1,000 mg (1,000 mg Intravenous New Bag 9/12/19 1025)       Diagnostic Studies  Results Reviewed     Procedure Component Value Units Date/Time    Comprehensive metabolic panel [[id number]]  (Abnormal) Collected:  09/12/19 0923    Lab Status:  Final result Specimen:  Blood from Arm, Left Updated:  09/12/19 0950     Sodium 137 mmol/L      Potassium 3 8 mmol/L      Chloride 103 mmol/L      CO2 28 mmol/L      ANION GAP 6 mmol/L      BUN 17 mg/dL      Creatinine 0 77 mg/dL      Glucose 103 mg/dL      Calcium 9 6 mg/dL      AST 13 U/L      ALT 20 U/L      Alkaline Phosphatase 65 U/L      Total Protein 7 0 g/dL      Albumin 4 3 g/dL      Total Bilirubin 0 30 mg/dL      eGFR 94 ml/min/1 73sq m     Narrative:       Benjamin guidelines for Chronic Kidney Disease (CKD):     Stage 1 with normal or high GFR (GFR > 90 mL/min/1 73 square meters)    Stage 2 Mild CKD (GFR = 60-89 mL/min/1 73 square meters)    Stage 3A Moderate CKD (GFR = 45-59 mL/min/1 73 square meters)    Stage 3B Moderate CKD (GFR = 30-44 mL/min/1 73 square meters)    Stage 4 Severe CKD (GFR = 15-29 mL/min/1 73 square meters)    Stage 5 End Stage CKD (GFR <15 mL/min/1 73 square meters)  Note: GFR calculation is accurate only with a steady state creatinine    Protime-INR [[id number]]  (Normal) Collected:  09/12/19 0923    Lab Status:  Final result Specimen:  Blood from Arm, Left Updated:  09/12/19 0946     Protime 10 4 seconds      INR 0 90    APTT [[id number]]  (Normal) Collected:  09/12/19 0923    Lab Status:  Final result Specimen:  Blood from Arm, Left Updated:  09/12/19 0946     PTT 36 seconds     CBC and differential [[id number]]  (Abnormal) Collected:  09/12/19 0923    Lab Status:  Final result Specimen:  Blood from Arm, Left Updated:  09/12/19 0934     WBC 15 80 Thousand/uL      RBC 4 53 Million/uL      Hemoglobin 14 8 g/dL      Hematocrit 43 3 %      MCV 96 fL      MCH 32 7 pg      MCHC 34 3 g/dL      RDW 13 2 %      MPV 8 5 fL      Platelets 711 Thousands/uL      Neutrophils Relative 76 %      Lymphocytes Relative 18 %      Monocytes Relative 5 %      Eosinophils Relative 1 %      Basophils Relative 0 %      Neutrophils Absolute 12 00 Thousands/µL      Lymphocytes Absolute 2 80 Thousands/µL      Monocytes Absolute 0 80 Thousand/µL      Eosinophils Absolute 0 10 Thousand/µL      Basophils Absolute 0 10 Thousands/µL     Urine Microscopic [[id number]]  (Abnormal) Collected:  09/12/19 0859    Lab Status:  Final result Specimen:  Urine, Clean Catch Updated:  09/12/19 0926     RBC, UA 2-4 /hpf      WBC, UA 10-20 /hpf      Epithelial Cells Occasional /hpf      Bacteria, UA None Seen /hpf     UA (URINE) with reflex to Microscopic [[id number]]  (Abnormal) Collected:  09/12/19 0859    Lab Status:  Final result Specimen:  Urine, Clean Catch Updated:  09/12/19 0919     Color, UA Straw     Clarity, UA Clear     Specific Gravity, UA 1 010     pH, UA 5 5     Leukocytes, UA 1+     Nitrite, UA Negative     Protein, UA Negative mg/dl      Glucose, UA Negative mg/dl      Ketones, UA Negative mg/dl      Urobilinogen, UA 0 2 E U /dl      Bilirubin, UA Negative     Blood, UA Trace-Intact    Urine culture [[id number]]     Lab Status:  No result Specimen:  Urine CT renal stone study abdomen pelvis wo contrast   Final Result by Marilyn Gauthier MD (09/12 1044)      No evidence of renal calculi or obstructive uropathy  Hysterectomy  Approximate 5 cm mildly complex appearing cystic structure within the right adnexa adjacent to the surgical sutures  Differential considerations include ovarian/paraovarian cyst or peritoneal inclusion cyst   Correlation with surgical history and    follow-up sonography is recommended  Mild sigmoid diverticulosis without diverticulitis  Workstation performed: [id number]                    Procedures  Procedures       ED Course  ED Course as of Sep 12 1112   Thu Sep 12, 2019   1110 Patient diagnosed with acute cystitis progression to pyelonephritis will discharge on oral antibiotics recommend following PCP outpatient as patient is nontoxic appearing currently in afebrile  Additionally incidental findings of emphysema diverticulosis as well as right adnexal cyst were made aware to patient and recommendation to follow up outpatient with PCP                                    MDM  Number of Diagnoses or Management Options  Acute pyelonephritis: new and requires workup  Adnexal cyst:   Diverticulosis:   Emphysema of lung ([address] ):   Hematuria: new and requires workup  Hypertension:      Amount and/or Complexity of Data Reviewed  Clinical lab tests: ordered and reviewed  Tests in the radiology section of CPT®: ordered and reviewed  Independent visualization of images, tracings, or specimens: yes    Risk of Complications, Morbidity, and/or Mortality  Presenting problems: moderate  Diagnostic procedures: moderate  Management options: moderate    Patient Progress  Patient progress: stable      Disposition  Final diagnoses:   Acute pyelonephritis   Hematuria   Adnexal cyst - Right side, noted on CT   Hypertension   Emphysema of lung ([address] ) - Noted on CT   Diverticulosis - Noted on CT     Time reflects when diagnosis was documented in both MDM as applicable and the Disposition within this note     Time User Action Codes Description Comment    9/12/2019 11:06 AM Gandhi Salk M Add [N10] Acute pyelonephritis     9/12/2019 11:06 AM Gandhi Salk M Add [R31 9] Hematuria     9/12/2019 11:06 AM Gandhi Salk M Add [N94 9] Adnexal cyst     9/12/2019 11:07 AM Jessica Miles Modify [N94 9] Adnexal cyst Right side, noted on CT    9/12/2019 11:07 AM Gandhi Salk M Add [I10] Hypertension     9/12/2019 11:10 AM Gandhi Salk M Add [J43 9] Emphysema of lung ([address] )     9/12/2019 11:10 AM Gandhi Salk M Modify [J43 9] Emphysema of lung ([address] ) Noted on CT    9/12/2019 11:10 AM Gandhi Salk M Add [K57 90] Diverticulosis     9/12/2019 11:10 AM Gandhi Salk M Modify [K57 90] Diverticulosis Noted on CT      ED Disposition     ED Disposition Condition Date/Time Comment    Discharge Stable Thu Sep 12, 2019 11:05 AM Aren Otero discharge to home/self care  Follow-up Information     Follow up With Specialties Details Why Contact Info    Gregory Beck PA-C Family Medicine, Internal Medicine, Physician Assistant Schedule an appointment as soon as possible for a visit in 1 week If symptoms worsen return to ER  [id number] [facility]            Patient's Medications   Discharge Prescriptions    CEPHALEXIN (KEFLEX) 500 MG CAPSULE    Take 1 capsule (500 mg total) by mouth every 12 (twelve) hours for 10 days       Start Date: 9/12/2019 End Date: 9/22/2019       Order Dose: 500 mg       Quantity: 20 capsule    Refills: 0     No discharge procedures on file      ED Provider  Electronically Signed by           Peyton Fleming PA-C  09/12/19 2638

## 2019-09-13 DIAGNOSIS — F41.1 GENERALIZED ANXIETY DISORDER: ICD-10-CM

## 2019-09-13 RX ORDER — ALPRAZOLAM 1 MG/1
1 TABLET ORAL
Qty: 30 TABLET | Refills: 0 | Status: SHIPPED | OUTPATIENT
Start: 2019-09-13 | End: 2019-10-18 | Stop reason: SDUPTHER

## 2019-09-18 ENCOUNTER — OFFICE VISIT (OUTPATIENT)
Dept: INTERNAL MEDICINE CLINIC | Facility: CLINIC | Age: 44
End: 2019-09-18
Payer: COMMERCIAL

## 2019-09-18 ENCOUNTER — APPOINTMENT (OUTPATIENT)
Dept: LAB | Facility: CLINIC | Age: 44
End: 2019-09-18
Payer: COMMERCIAL

## 2019-09-18 VITALS
HEIGHT: 63 IN | RESPIRATION RATE: 18 BRPM | SYSTOLIC BLOOD PRESSURE: 138 MMHG | TEMPERATURE: 97.9 F | DIASTOLIC BLOOD PRESSURE: 76 MMHG | HEART RATE: 81 BPM | BODY MASS INDEX: 39.09 KG/M2 | OXYGEN SATURATION: 97 % | WEIGHT: 220.6 LBS

## 2019-09-18 DIAGNOSIS — Z80.41 FAMILY HISTORY OF OVARIAN CANCER: ICD-10-CM

## 2019-09-18 DIAGNOSIS — F41.1 GENERALIZED ANXIETY DISORDER: ICD-10-CM

## 2019-09-18 DIAGNOSIS — F17.200 SMOKING: ICD-10-CM

## 2019-09-18 DIAGNOSIS — R63.5 WEIGHT GAIN: ICD-10-CM

## 2019-09-18 DIAGNOSIS — F33.1 MODERATE EPISODE OF RECURRENT MAJOR DEPRESSIVE DISORDER (HCC): ICD-10-CM

## 2019-09-18 DIAGNOSIS — N83.209 CYST OF OVARY, UNSPECIFIED LATERALITY: Primary | ICD-10-CM

## 2019-09-18 PROBLEM — R20.2 NUMBNESS AND TINGLING: Status: RESOLVED | Noted: 2018-07-11 | Resolved: 2019-09-18

## 2019-09-18 PROBLEM — R20.0 NUMBNESS AND TINGLING: Status: RESOLVED | Noted: 2018-07-11 | Resolved: 2019-09-18

## 2019-09-18 LAB — CANCER AG125 SERPL-ACNC: 2.5 U/ML (ref 0–30)

## 2019-09-18 PROCEDURE — 99214 OFFICE O/P EST MOD 30 MIN: CPT | Performed by: PHYSICIAN ASSISTANT

## 2019-09-18 PROCEDURE — 86304 IMMUNOASSAY TUMOR CA 125: CPT

## 2019-09-18 PROCEDURE — 36415 COLL VENOUS BLD VENIPUNCTURE: CPT

## 2019-09-18 RX ORDER — TOPIRAMATE 25 MG/1
25 CAPSULE, COATED PELLETS ORAL 2 TIMES DAILY
Qty: 60 CAPSULE | Refills: 0 | Status: SHIPPED | OUTPATIENT
Start: 2019-09-18 | End: 2019-10-11 | Stop reason: SDUPTHER

## 2019-09-18 RX ORDER — ARIPIPRAZOLE 10 MG/1
10 TABLET ORAL DAILY
Qty: 30 TABLET | Refills: 2 | Status: SHIPPED | OUTPATIENT
Start: 2019-09-18 | End: 2019-12-10 | Stop reason: SDUPTHER

## 2019-09-18 RX ORDER — VARENICLINE TARTRATE 25 MG
KIT ORAL
Qty: 53 TABLET | Refills: 0 | Status: SHIPPED | OUTPATIENT
Start: 2019-09-18 | End: 2020-11-12

## 2019-09-18 NOTE — ASSESSMENT & PLAN NOTE
Start Chantix  Flex quit approach discussed  Directions for use and possible side effects discussed and patient verbalized understanding of these

## 2019-09-18 NOTE — PROGRESS NOTES
Assessment/Plan:  Problem List Items Addressed This Visit        Endocrine    Cyst of ovary - Primary     Will get  level and pelvic ultrasound to better evaluate  Relevant Orders    US pelvis complete non OB       Other    Generalized anxiety disorder    Relevant Medications    varenicline (CHANTIX RUTH) 0 5 MG X 11 & 1 MG X 42 tablet    ARIPiprazole (ABILIFY) 10 mg tablet    Smoking     Start Chantix  Flex quit approach discussed  Directions for use and possible side effects discussed and patient verbalized understanding of these  Relevant Medications    varenicline (CHANTIX RUTH) 0 5 MG X 11 & 1 MG X 42 tablet    Weight gain     Start topamax  Directions for dose titration discussed as well as potential side effects  Continue wellbutrin  Relevant Medications    topiramate (TOPAMAX) 25 mg sprinkle capsule    Major depressive disorder with current active episode     Continue current regime but increase abilify to 10mg to further combat symptoms  Relevant Medications    varenicline (CHANTIX RUTH) 0 5 MG X 11 & 1 MG X 42 tablet    ARIPiprazole (ABILIFY) 10 mg tablet      Other Visit Diagnoses     Family history of ovarian cancer        Relevant Orders        US pelvis complete non OB           Diagnoses and all orders for this visit:    Cyst of ovary, unspecified laterality  -     US pelvis complete non OB; Future    Generalized anxiety disorder  -     ARIPiprazole (ABILIFY) 10 mg tablet; Take 1 tablet (10 mg total) by mouth daily    Smoking  -     varenicline (CHANTIX RUTH) 0 5 MG X 11 & 1 MG X 42 tablet; Take one 0 5mg tab by mouth 1x daily for 3 days, then increase to one 0 5mg tab 2x daily for 3 days, then increase to one 1mg tab 2x daily    Weight gain  -     topiramate (TOPAMAX) 25 mg sprinkle capsule; Take 1 capsule (25 mg total) by mouth 2 (two) times a day    Family history of ovarian cancer  -     ;  Future  -     US pelvis complete non OB; Future    Moderate episode of recurrent major depressive disorder (HCC)        Smoking  Start Chantix  Flex quit approach discussed  Directions for use and possible side effects discussed and patient verbalized understanding of these  Weight gain  Start topamax  Directions for dose titration discussed as well as potential side effects  Continue wellbutrin  Major depressive disorder with current active episode  Continue current regime but increase abilify to 10mg to further combat symptoms  Cyst of ovary  Will get  level and pelvic ultrasound to better evaluate  Subjective:      Patient ID: Layo Sheriff is a 40 y o  female  Pt presents with several complaints  First she notes some increased depressive symptoms  She does not wish to change cymbalta as that has been helpful for her fibromyalgia symptoms  She did felt he combination of cymbalta and abilify worked well but she may need a higher dose  She also complains of weight gain and desires an option to help with this  BMI is 39  WE discussed diet modification and exercise  We also discussed topamax  She is already on wellbutrin as well  She also desires to quit smoking and would like Chantix for this  She was recently in the ER for pyelonephritis and had an incidentally found ovarian cyst on CT scan  She is concerned as there is a family hx of ovarian cancer  Will order ultrasound and   The following portions of the patient's history were reviewed and updated as appropriate:   She has a past medical history of Depression, Generalized anxiety disorder, H/O: hysterectomy, Hypertension, Obesity, Ovarian cyst, PVC (premature ventricular contraction), and Urinary tract infection  ,  does not have any pertinent problems on file  ,   has a past surgical history that includes Hysterectomy and Tumor removal ,  family history includes Diabetes in her mother; Mental illness in her paternal aunt and paternal uncle; Substance Abuse in her paternal aunt and paternal uncle ,   reports that she has been smoking e-cigarettes and cigarettes  She has been smoking about 1 00 pack per day  She has never used smokeless tobacco  She reports that she drinks alcohol  She reports that she does not use drugs  ,  is allergic to nicotine polacrilex [nicotine] and fluoxetine     Current Outpatient Medications   Medication Sig Dispense Refill    albuterol (VENTOLIN HFA) 90 mcg/act inhaler Inhale 2 puffs every 4 (four) hours as needed for wheezing 1 Inhaler 5    ALPRAZolam (XANAX) 1 mg tablet Take 1 tablet (1 mg total) by mouth daily at bedtime as needed for anxiety 30 tablet 0    ARIPiprazole (ABILIFY) 10 mg tablet Take 1 tablet (10 mg total) by mouth daily 30 tablet 2    buPROPion (WELLBUTRIN XL) 300 mg 24 hr tablet Take 1 tablet (300 mg total) by mouth daily 30 tablet 5    cephalexin (KEFLEX) 500 mg capsule Take 1 capsule (500 mg total) by mouth every 12 (twelve) hours for 10 days 20 capsule 0    DULoxetine (CYMBALTA) 60 mg delayed release capsule Take 1 capsule (60 mg total) by mouth 2 (two) times a day 60 capsule 5    fluticasone-vilanterol (BREO ELLIPTA) 100-25 mcg/inh inhaler Inhale 1 puff daily Rinse mouth after use   1 Inhaler 2    lisinopril-hydrochlorothiazide (PRINZIDE,ZESTORETIC) 10-12 5 MG per tablet Take 1 tablet by mouth daily 30 tablet 5    metoprolol succinate (TOPROL-XL) 50 mg 24 hr tablet TAKE 1 TABLET BY MOUTH EVERY DAY 30 tablet 5    ciclopirox (PENLAC) 8 % solution Apply topically daily at bedtime (Patient not taking: Reported on 9/18/2019) 6 6 mL 0    topiramate (TOPAMAX) 25 mg sprinkle capsule Take 1 capsule (25 mg total) by mouth 2 (two) times a day 60 capsule 0    traZODone (DESYREL) 150 mg tablet Take 1 tablet (150 mg total) by mouth daily at bedtime 30 tablet 2    varenicline (CHANTIX RUTH) 0 5 MG X 11 & 1 MG X 42 tablet Take one 0 5mg tab by mouth 1x daily for 3 days, then increase to one 0 5mg tab 2x daily for 3 days, then increase to one 1mg tab 2x daily 53 tablet 0     No current facility-administered medications for this visit  Review of Systems   Constitutional: Negative for chills and fever  HENT: Negative for congestion, ear pain, hearing loss, postnasal drip, rhinorrhea, sinus pressure, sinus pain, sore throat and trouble swallowing  Eyes: Negative for pain and visual disturbance  Respiratory: Negative for cough, chest tightness, shortness of breath and wheezing  Cardiovascular: Negative  Negative for chest pain, palpitations and leg swelling  Gastrointestinal: Negative for abdominal pain, blood in stool, constipation, diarrhea, nausea and vomiting  Endocrine: Negative for cold intolerance, heat intolerance, polydipsia, polyphagia and polyuria  Genitourinary: Negative for difficulty urinating, dysuria, flank pain and urgency  Musculoskeletal: Negative for arthralgias, back pain, gait problem and myalgias  Skin: Negative for rash  Allergic/Immunologic: Negative  Neurological: Negative for dizziness, weakness, light-headedness and headaches  Hematological: Negative  Psychiatric/Behavioral: Positive for dysphoric mood  Negative for behavioral problems and sleep disturbance  The patient is not nervous/anxious            PHQ-9 Depression Screening    PHQ-9:    Frequency of the following problems over the past two weeks:       Little interest or pleasure in doing things:  1 - several days  Feeling down, depressed, or hopeless:  1 - several days  Trouble falling or staying asleep, or sleeping too much:  3 - nearly every day  Feeling tired or having little energy:  3 - nearly every day  Poor appetite or overeatin - more than half the days  Feeling bad about yourself - or that you are a failure or have let yourself or your family down:  1 - several days  Trouble concentrating on things, such as reading the newspaper or watching television:  0 - not at all  Moving or speaking so slowly that other people could have noticed  Or the opposite - being so fidgety or restless that you have been moving around a lot more than usual:  0 - not at all  Thoughts that you would be better off dead, or of hurting yourself in some way:  0 - not at all  PHQ-2 Score:  2  PHQ-9 Score:  11          Objective:  Vitals:    09/18/19 1358   BP: 138/76   BP Location: Left arm   Patient Position: Sitting   Cuff Size: Adult   Pulse: 81   Resp: 18   Temp: 97 9 °F (36 6 °C)   TempSrc: Tympanic   SpO2: 97%   Weight: 100 kg (220 lb 9 6 oz)   Height: 5' 3" (1 6 m)     Body mass index is 39 08 kg/m²  Physical Exam   Constitutional: She is oriented to person, place, and time  She appears well-developed and well-nourished  No distress  HENT:   Head: Normocephalic and atraumatic  Right Ear: External ear normal    Left Ear: External ear normal    Nose: Nose normal    Mouth/Throat: Oropharynx is clear and moist  No oropharyngeal exudate  Eyes: Pupils are equal, round, and reactive to light  Conjunctivae and EOM are normal  Right eye exhibits no discharge  Left eye exhibits no discharge  No scleral icterus  Neck: Normal range of motion  Neck supple  No thyromegaly present  Cardiovascular: Normal rate, regular rhythm and normal heart sounds  Exam reveals no gallop and no friction rub  No murmur heard  Pulmonary/Chest: Effort normal and breath sounds normal  No respiratory distress  She has no wheezes  She has no rales  Abdominal: Soft  Bowel sounds are normal  She exhibits no distension  There is no tenderness  Musculoskeletal: Normal range of motion  She exhibits no edema, tenderness or deformity  Neurological: She is alert and oriented to person, place, and time  No cranial nerve deficit  Skin: Skin is warm and dry  She is not diaphoretic  Psychiatric: Her behavior is normal  Judgment and thought content normal  She exhibits a depressed mood         Depression Screening Follow-up Plan: Patient's depression screening was positive with a PHQ-2 score of 2  Their PHQ-9 score was 11  Continue regular follow-up with their psychologist/therapist/psychiatrist who is managing their mental health condition(s)  BMI Counseling: Body mass index is 39 08 kg/m²  The BMI is above normal  Pharmacotherapy was ordered for patient to aid in weight loss  Tobacco Cessation Counseling: Tobacco cessation counseling and education was provided  The patient is sincerely urged to quit consumption of tobacco  She is ready to quit tobacco  The numerous health risks of tobacco consumption were discussed  Prescribed the following medications: varenicline (Chantix)

## 2019-09-18 NOTE — ASSESSMENT & PLAN NOTE
Start topamax  Directions for dose titration discussed as well as potential side effects  Continue wellbutrin

## 2019-09-28 ENCOUNTER — HOSPITAL ENCOUNTER (OUTPATIENT)
Dept: ULTRASOUND IMAGING | Facility: HOSPITAL | Age: 44
Discharge: HOME | End: 2019-09-28
Payer: COMMERCIAL

## 2019-09-28 DIAGNOSIS — Z80.41 FAMILY HISTORY OF OVARIAN CANCER: ICD-10-CM

## 2019-09-28 DIAGNOSIS — N83.209 CYST OF OVARY, UNSPECIFIED LATERALITY: ICD-10-CM

## 2019-09-28 PROCEDURE — 76856 US EXAM PELVIC COMPLETE: CPT

## 2019-09-28 PROCEDURE — 76830 TRANSVAGINAL US NON-OB: CPT

## 2019-10-01 DIAGNOSIS — N83.201 CYST OF RIGHT OVARY: Primary | ICD-10-CM

## 2019-10-02 ENCOUNTER — TELEPHONE (OUTPATIENT)
Dept: INTERNAL MEDICINE CLINIC | Facility: CLINIC | Age: 44
End: 2019-10-02

## 2019-10-02 NOTE — TELEPHONE ENCOUNTER
Patient called and was in a yelling manner stating why we did not release to her the size of her ovarian cysts that were found on her u/s  Once she calmed down, I stated to her that we can only release what the provider states to us, we can not release other information unless the provider okays us to do so  She then states "who is a provider?" I then stated your doctor you see in the office, then she states "I dont understand what doctor you are talking about"  I then stated you see Hans Mendoza as your doctor correct? She then states oh yeah that's right  I then state that If you want to go into further detail about your testing you will need to talk to the provider regarding your questions or follow up here with an appt to discuss  She then stated she wants to discuss this personally with the doctor and wants her to call her to discuss her concerns and questions

## 2019-10-02 NOTE — TELEPHONE ENCOUNTER
Each cyst is a few centimeters in size  As mentioned below repeat is recommended to make sure that they do not grow bigger  Often these will resolve on their own as well  If she would like to discuss further she may make an appt to discuss as you relayed to her previously  Thank you for handling the call well

## 2019-10-05 DIAGNOSIS — I49.3 PVC (PREMATURE VENTRICULAR CONTRACTION): ICD-10-CM

## 2019-10-05 RX ORDER — METOPROLOL SUCCINATE 50 MG/1
TABLET, EXTENDED RELEASE ORAL
Qty: 30 TABLET | Refills: 5 | Status: SHIPPED | OUTPATIENT
Start: 2019-10-05 | End: 2020-06-02 | Stop reason: SDUPTHER

## 2019-10-09 DIAGNOSIS — F17.200 SMOKING: ICD-10-CM

## 2019-10-09 DIAGNOSIS — R63.5 WEIGHT GAIN: ICD-10-CM

## 2019-10-09 RX ORDER — BUPROPION HYDROCHLORIDE 300 MG/1
TABLET ORAL
Qty: 30 TABLET | Refills: 5 | Status: SHIPPED | OUTPATIENT
Start: 2019-10-09 | End: 2020-06-16 | Stop reason: SDUPTHER

## 2019-10-11 DIAGNOSIS — R63.5 WEIGHT GAIN: ICD-10-CM

## 2019-10-14 RX ORDER — TOPIRAMATE 25 MG/1
25 CAPSULE, COATED PELLETS ORAL 2 TIMES DAILY
Qty: 60 CAPSULE | Refills: 0 | Status: SHIPPED | OUTPATIENT
Start: 2019-10-14 | End: 2019-10-18

## 2019-10-16 ENCOUNTER — TELEPHONE (OUTPATIENT)
Dept: INTERNAL MEDICINE CLINIC | Facility: CLINIC | Age: 44
End: 2019-10-16

## 2019-10-16 NOTE — TELEPHONE ENCOUNTER
Patient called in stating she is urinating blood was on an antibiotic she finished it 3 wks ago she does see Joy Horvath on 10/18/19 but does not think she should wait that long       Please advise            Also needs refill on     lorazapam 1 mg  I tab before bed

## 2019-10-18 ENCOUNTER — OFFICE VISIT (OUTPATIENT)
Dept: INTERNAL MEDICINE CLINIC | Facility: CLINIC | Age: 44
End: 2019-10-18
Payer: COMMERCIAL

## 2019-10-18 VITALS
HEART RATE: 74 BPM | RESPIRATION RATE: 18 BRPM | DIASTOLIC BLOOD PRESSURE: 68 MMHG | SYSTOLIC BLOOD PRESSURE: 106 MMHG | HEIGHT: 63 IN | BODY MASS INDEX: 39.27 KG/M2 | WEIGHT: 221.6 LBS | TEMPERATURE: 97 F | OXYGEN SATURATION: 98 %

## 2019-10-18 DIAGNOSIS — Z23 NEED FOR INFLUENZA VACCINATION: ICD-10-CM

## 2019-10-18 DIAGNOSIS — R31.9 HEMATURIA, UNSPECIFIED TYPE: ICD-10-CM

## 2019-10-18 DIAGNOSIS — N83.201 CYST OF RIGHT OVARY: Primary | ICD-10-CM

## 2019-10-18 DIAGNOSIS — J44.9 CHRONIC OBSTRUCTIVE PULMONARY DISEASE, UNSPECIFIED COPD TYPE (HCC): ICD-10-CM

## 2019-10-18 DIAGNOSIS — R63.5 WEIGHT GAIN: ICD-10-CM

## 2019-10-18 DIAGNOSIS — F41.1 GENERALIZED ANXIETY DISORDER: ICD-10-CM

## 2019-10-18 LAB
SL AMB  POCT GLUCOSE, UA: NEGATIVE
SL AMB LEUKOCYTE ESTERASE,UA: NEGATIVE
SL AMB POCT BILIRUBIN,UA: NEGATIVE
SL AMB POCT BLOOD,UA: NEGATIVE
SL AMB POCT CLARITY,UA: CLEAR
SL AMB POCT COLOR,UA: NORMAL
SL AMB POCT KETONES,UA: NEGATIVE
SL AMB POCT NITRITE,UA: NEGATIVE
SL AMB POCT PH,UA: 6
SL AMB POCT SPECIFIC GRAVITY,UA: 1.01
SL AMB POCT URINE PROTEIN: NEGATIVE
SL AMB POCT UROBILINOGEN: 0.2

## 2019-10-18 PROCEDURE — 99214 OFFICE O/P EST MOD 30 MIN: CPT | Performed by: PHYSICIAN ASSISTANT

## 2019-10-18 PROCEDURE — 81002 URINALYSIS NONAUTO W/O SCOPE: CPT | Performed by: PHYSICIAN ASSISTANT

## 2019-10-18 PROCEDURE — 3008F BODY MASS INDEX DOCD: CPT | Performed by: PHYSICIAN ASSISTANT

## 2019-10-18 PROCEDURE — 90471 IMMUNIZATION ADMIN: CPT | Performed by: PHYSICIAN ASSISTANT

## 2019-10-18 PROCEDURE — 90686 IIV4 VACC NO PRSV 0.5 ML IM: CPT | Performed by: PHYSICIAN ASSISTANT

## 2019-10-18 RX ORDER — FLUTICASONE FUROATE AND VILANTEROL 100; 25 UG/1; UG/1
1 POWDER RESPIRATORY (INHALATION) DAILY
Qty: 1 INHALER | Refills: 2 | Status: SHIPPED | COMMUNITY
Start: 2019-10-18 | End: 2020-11-12 | Stop reason: SDUPTHER

## 2019-10-18 RX ORDER — TOPIRAMATE 50 MG/1
50 TABLET, FILM COATED ORAL 2 TIMES DAILY
Qty: 60 TABLET | Refills: 2 | Status: SHIPPED | OUTPATIENT
Start: 2019-10-18 | End: 2020-04-20 | Stop reason: SDUPTHER

## 2019-10-18 RX ORDER — ALPRAZOLAM 1 MG/1
1 TABLET ORAL
Qty: 30 TABLET | Refills: 0 | Status: SHIPPED | OUTPATIENT
Start: 2019-10-18 | End: 2019-12-02 | Stop reason: SDUPTHER

## 2019-10-19 NOTE — ASSESSMENT & PLAN NOTE
Pt with 2 right ovarian cysts  Pt is nervous about these and would prefer to recheck at 6 weeks rather than 12 weeks

## 2019-10-19 NOTE — PROGRESS NOTES
Assessment/Plan:  Problem List Items Addressed This Visit        Endocrine    Cyst of ovary - Primary     Pt with 2 right ovarian cysts  Pt is nervous about these and would prefer to recheck at 6 weeks rather than 12 weeks  Other    Generalized anxiety disorder    Relevant Medications    ALPRAZolam (XANAX) 1 mg tablet    Weight gain     Increase topamax to 50mg BID to further assist in weight loss efforts  Relevant Medications    topiramate (TOPAMAX) 50 MG tablet      Other Visit Diagnoses     Chronic obstructive pulmonary disease, unspecified COPD type ([facility] )        Relevant Medications    fluticasone-vilanterol (BREO ELLIPTA) 100-25 mcg/inh inhaler    Hematuria, unspecified type        Relevant Orders    POCT urine dip (Completed)    Need for influenza vaccination        Relevant Orders    influenza vaccine, [id number], quadrivalent, 0 5 mL, preservative-free, for adult and pediatric patients 6 mos+ (AFLURIA, FLUARIX, FLULAVAL, FLUZONE) (Completed)           Diagnoses and all orders for this visit:    Cyst of right ovary    Generalized anxiety disorder  -     ALPRAZolam (XANAX) 1 mg tablet; Take 1 tablet (1 mg total) by mouth daily at bedtime as needed for anxiety    Chronic obstructive pulmonary disease, unspecified COPD type ([facility] )  -     fluticasone-vilanterol (BREO ELLIPTA) 100-25 mcg/inh inhaler; Inhale 1 puff daily Rinse mouth after use  Hematuria, unspecified type  -     POCT urine dip    Need for influenza vaccination  -     influenza vaccine, [id number], quadrivalent, 0 5 mL, preservative-free, for adult and pediatric patients 6 mos+ (AFLURIA, FLUARIX, FLULAVAL, FLUZONE)    Weight gain  -     topiramate (TOPAMAX) 50 MG tablet; Take 1 tablet (50 mg total) by mouth 2 (two) times a day        Cyst of ovary  Pt with 2 right ovarian cysts  Pt is nervous about these and would prefer to recheck at 6 weeks rather than 12 weeks       Weight gain  Increase topamax to 50mg BID to further assist in weight loss efforts  Subjective:      Patient ID: Dougie Dalton is a 40 y o  female  Pt presents to review recent lab and imaging results  She had an ultrasound to follow up on a cyst found on her right ovary during a CT scan  The U/S showed to right sided ovarian cysts and follow up in 6-12 weeks was recommended  Her CT also showed some emphysema but pt continues to smoke and stopped taking Chantix  She also notes an episode of gross hematuria that began a few days ago  UA in the office is completely negative  Recent labs all normal  She also complains of ongoing weight gain  She is doing well with Chantix and desires trying a higher dose  The following portions of the patient's history were reviewed and updated as appropriate:   She has a past medical history of Depression, Generalized anxiety disorder, H/O: hysterectomy, Hypertension, Obesity, Ovarian cyst, PVC (premature ventricular contraction), and Urinary tract infection  ,  does not have any pertinent problems on file  ,   has a past surgical history that includes Hysterectomy and Tumor removal ,  family history includes Diabetes in her mother; Mental illness in her paternal aunt and paternal uncle; Substance Abuse in her paternal aunt and paternal uncle ,   reports that she has been smoking e-cigarettes and cigarettes  She has been smoking about 1 00 pack per day  She has never used smokeless tobacco  She reports that she drinks alcohol  She reports that she does not use drugs  ,  is allergic to nicotine polacrilex [nicotine] and fluoxetine     Current Outpatient Medications   Medication Sig Dispense Refill    albuterol (VENTOLIN HFA) 90 mcg/act inhaler Inhale 2 puffs every 4 (four) hours as needed for wheezing 1 Inhaler 5    ALPRAZolam (XANAX) 1 mg tablet Take 1 tablet (1 mg total) by mouth daily at bedtime as needed for anxiety 30 tablet 0    ARIPiprazole (ABILIFY) 10 mg tablet Take 1 tablet (10 mg total) by mouth daily 30 tablet 2    buPROPion (WELLBUTRIN XL) 300 mg 24 hr tablet TAKE 1 TABLET BY MOUTH EVERY DAY 30 tablet 5    DULoxetine (CYMBALTA) 60 mg delayed release capsule Take 1 capsule (60 mg total) by mouth 2 (two) times a day 60 capsule 5    fluticasone-vilanterol (BREO ELLIPTA) 100-25 mcg/inh inhaler Inhale 1 puff daily Rinse mouth after use  1 Inhaler 2    lisinopril-hydrochlorothiazide (PRINZIDE,ZESTORETIC) 10-12 5 MG per tablet Take 1 tablet by mouth daily 30 tablet 5    metoprolol succinate (TOPROL-XL) 50 mg 24 hr tablet TAKE 1 TABLET BY MOUTH EVERY DAY 30 tablet 5    traZODone (DESYREL) 150 mg tablet Take 1 tablet (150 mg total) by mouth daily at bedtime 30 tablet 2    topiramate (TOPAMAX) 50 MG tablet Take 1 tablet (50 mg total) by mouth 2 (two) times a day 60 tablet 2    varenicline (CHANTIX RUTH) 0 5 MG X 11 & 1 MG X 42 tablet Take one 0 5mg tab by mouth 1x daily for 3 days, then increase to one 0 5mg tab 2x daily for 3 days, then increase to one 1mg tab 2x daily (Patient not taking: Reported on 10/18/2019) 53 tablet 0     No current facility-administered medications for this visit  Review of Systems   Constitutional: Negative for chills and fever  HENT: Negative for congestion, ear pain, hearing loss, postnasal drip, rhinorrhea, sinus pressure, sinus pain, sore throat and trouble swallowing  Eyes: Negative for pain and visual disturbance  Respiratory: Negative for cough, chest tightness, shortness of breath and wheezing  Cardiovascular: Negative  Negative for chest pain, palpitations and leg swelling  Gastrointestinal: Negative for abdominal pain, blood in stool, constipation, diarrhea, nausea and vomiting  Endocrine: Negative for cold intolerance, heat intolerance, polydipsia, polyphagia and polyuria  Genitourinary: Negative for difficulty urinating, dysuria, flank pain and urgency  Musculoskeletal: Negative for arthralgias, back pain, gait problem and myalgias  Skin: Negative for rash  Allergic/Immunologic: Negative  Neurological: Negative for dizziness, weakness, light-headedness and headaches  Hematological: Negative  Psychiatric/Behavioral: Negative for behavioral problems, dysphoric mood and sleep disturbance  The patient is not nervous/anxious  PHQ-9 Depression Screening    PHQ-9:    Frequency of the following problems over the past two weeks:       Little interest or pleasure in doing things:  2 - more than half the days  Feeling down, depressed, or hopeless:  2 - more than half the days  Trouble falling or staying asleep, or sleeping too much:  2 - more than half the days  Feeling tired or having little energy:  2 - more than half the days  Poor appetite or overeatin - not at all  Feeling bad about yourself - or that you are a failure or have let yourself or your family down:  1 - several days  Trouble concentrating on things, such as reading the newspaper or watching television:  0 - not at all  Moving or speaking so slowly that other people could have noticed  Or the opposite - being so fidgety or restless that you have been moving around a lot more than usual:  0 - not at all  Thoughts that you would be better off dead, or of hurting yourself in some way:  0 - not at all  PHQ-2 Score:  4  PHQ-9 Score:  9          Objective:  Vitals:    10/18/19 1548   BP: 106/68   BP Location: Left arm   Patient Position: Sitting   Cuff Size: Adult   Pulse: 74   Resp: 18   Temp: (!) 97 °F (36 1 °C)   TempSrc: Tympanic   SpO2: 98%   Weight: 101 kg (221 lb 9 6 oz)   Height: 5' 3" (1 6 m)     Body mass index is 39 25 kg/m²  Physical Exam   Constitutional: She is oriented to person, place, and time  She appears well-developed and well-nourished  No distress  HENT:   Head: Normocephalic and atraumatic  Right Ear: External ear normal    Left Ear: External ear normal    Nose: Nose normal    Mouth/Throat: Oropharynx is clear and moist  No oropharyngeal exudate     Eyes: Pupils are equal, round, and reactive to light  Conjunctivae and EOM are normal  Right eye exhibits no discharge  Left eye exhibits no discharge  No scleral icterus  Neck: Normal range of motion  Neck supple  No thyromegaly present  Cardiovascular: Normal rate, regular rhythm and normal heart sounds  Exam reveals no gallop and no friction rub  No murmur heard  Pulmonary/Chest: Effort normal and breath sounds normal  No respiratory distress  She has no wheezes  She has no rales  Abdominal: Soft  Bowel sounds are normal  She exhibits no distension  There is no tenderness  Musculoskeletal: Normal range of motion  She exhibits no edema, tenderness or deformity  Neurological: She is alert and oriented to person, place, and time  No cranial nerve deficit  Skin: Skin is warm and dry  She is not diaphoretic  Psychiatric: She has a normal mood and affect  Her behavior is normal  Judgment and thought content normal        Depression Screening Follow-up Plan: Patient's depression screening was positive with a PHQ-2 score of 4  Their PHQ-9 score was 9  Clinically patient does not have depression  No treatment is required

## 2019-11-12 DIAGNOSIS — F51.01 PRIMARY INSOMNIA: ICD-10-CM

## 2019-11-12 DIAGNOSIS — F41.1 GENERALIZED ANXIETY DISORDER: ICD-10-CM

## 2019-11-12 RX ORDER — DULOXETIN HYDROCHLORIDE 60 MG/1
60 CAPSULE, DELAYED RELEASE ORAL 2 TIMES DAILY
Qty: 60 CAPSULE | Refills: 5 | Status: SHIPPED | OUTPATIENT
Start: 2019-11-12 | End: 2020-06-01 | Stop reason: SDUPTHER

## 2019-11-12 RX ORDER — TRAZODONE HYDROCHLORIDE 150 MG/1
150 TABLET ORAL
Qty: 30 TABLET | Refills: 2 | Status: SHIPPED | OUTPATIENT
Start: 2019-11-12 | End: 2020-03-04 | Stop reason: SDUPTHER

## 2019-12-02 DIAGNOSIS — F41.1 GENERALIZED ANXIETY DISORDER: ICD-10-CM

## 2019-12-02 RX ORDER — ALPRAZOLAM 1 MG/1
1 TABLET ORAL
Qty: 30 TABLET | Refills: 0 | Status: SHIPPED | OUTPATIENT
Start: 2019-12-02 | End: 2020-02-11 | Stop reason: SDUPTHER

## 2019-12-10 DIAGNOSIS — F41.1 GENERALIZED ANXIETY DISORDER: ICD-10-CM

## 2019-12-10 RX ORDER — ARIPIPRAZOLE 10 MG/1
TABLET ORAL
Qty: 30 TABLET | Refills: 2 | Status: SHIPPED | OUTPATIENT
Start: 2019-12-10 | End: 2020-04-20

## 2020-01-22 ENCOUNTER — HOSPITAL ENCOUNTER (OUTPATIENT)
Dept: ULTRASOUND IMAGING | Facility: HOSPITAL | Age: 45
Discharge: HOME | End: 2020-01-22
Payer: COMMERCIAL

## 2020-01-22 DIAGNOSIS — N83.201 CYST OF RIGHT OVARY: ICD-10-CM

## 2020-01-22 PROCEDURE — 76856 US EXAM PELVIC COMPLETE: CPT

## 2020-01-22 PROCEDURE — 76830 TRANSVAGINAL US NON-OB: CPT

## 2020-01-29 DIAGNOSIS — I10 BENIGN ESSENTIAL HYPERTENSION: ICD-10-CM

## 2020-01-29 RX ORDER — LISINOPRIL AND HYDROCHLOROTHIAZIDE 12.5; 1 MG/1; MG/1
TABLET ORAL
Qty: 30 TABLET | Refills: 0 | Status: SHIPPED | OUTPATIENT
Start: 2020-01-29 | End: 2020-03-25

## 2020-02-11 DIAGNOSIS — F41.1 GENERALIZED ANXIETY DISORDER: ICD-10-CM

## 2020-02-12 RX ORDER — ALPRAZOLAM 1 MG/1
1 TABLET ORAL
Qty: 30 TABLET | Refills: 0 | Status: SHIPPED | OUTPATIENT
Start: 2020-02-12 | End: 2020-03-25 | Stop reason: SDUPTHER

## 2020-03-04 DIAGNOSIS — F51.01 PRIMARY INSOMNIA: ICD-10-CM

## 2020-03-04 RX ORDER — TRAZODONE HYDROCHLORIDE 150 MG/1
150 TABLET ORAL
Qty: 30 TABLET | Refills: 5 | Status: SHIPPED | OUTPATIENT
Start: 2020-03-04 | End: 2020-09-14

## 2020-03-25 DIAGNOSIS — I10 BENIGN ESSENTIAL HYPERTENSION: ICD-10-CM

## 2020-03-25 DIAGNOSIS — F41.1 GENERALIZED ANXIETY DISORDER: ICD-10-CM

## 2020-03-25 RX ORDER — LISINOPRIL AND HYDROCHLOROTHIAZIDE 12.5; 1 MG/1; MG/1
TABLET ORAL
Qty: 30 TABLET | Refills: 0 | Status: SHIPPED | OUTPATIENT
Start: 2020-03-25 | End: 2020-04-17

## 2020-03-25 RX ORDER — ALPRAZOLAM 1 MG/1
1 TABLET ORAL
Qty: 30 TABLET | Refills: 0 | Status: SHIPPED | OUTPATIENT
Start: 2020-03-25 | End: 2020-06-02 | Stop reason: SDUPTHER

## 2020-04-17 DIAGNOSIS — I10 BENIGN ESSENTIAL HYPERTENSION: ICD-10-CM

## 2020-04-17 RX ORDER — LISINOPRIL AND HYDROCHLOROTHIAZIDE 12.5; 1 MG/1; MG/1
TABLET ORAL
Qty: 30 TABLET | Refills: 5 | Status: SHIPPED | OUTPATIENT
Start: 2020-04-17 | End: 2020-10-20

## 2020-04-18 DIAGNOSIS — F41.1 GENERALIZED ANXIETY DISORDER: ICD-10-CM

## 2020-04-20 DIAGNOSIS — R63.5 WEIGHT GAIN: ICD-10-CM

## 2020-04-20 RX ORDER — ARIPIPRAZOLE 10 MG/1
TABLET ORAL
Qty: 30 TABLET | Refills: 2 | Status: SHIPPED | OUTPATIENT
Start: 2020-04-20 | End: 2020-05-12

## 2020-04-20 RX ORDER — TOPIRAMATE 50 MG/1
50 TABLET, FILM COATED ORAL 2 TIMES DAILY
Qty: 60 TABLET | Refills: 0 | Status: SHIPPED | OUTPATIENT
Start: 2020-04-20 | End: 2020-05-12

## 2020-05-12 DIAGNOSIS — F41.1 GENERALIZED ANXIETY DISORDER: ICD-10-CM

## 2020-05-12 DIAGNOSIS — R63.5 WEIGHT GAIN: ICD-10-CM

## 2020-05-12 RX ORDER — ARIPIPRAZOLE 10 MG/1
TABLET ORAL
Qty: 30 TABLET | Refills: 2 | Status: SHIPPED | OUTPATIENT
Start: 2020-05-12 | End: 2020-08-13

## 2020-05-12 RX ORDER — TOPIRAMATE 50 MG/1
TABLET, FILM COATED ORAL
Qty: 60 TABLET | Refills: 5 | Status: SHIPPED | OUTPATIENT
Start: 2020-05-12 | End: 2020-11-12

## 2020-06-01 DIAGNOSIS — F41.1 GENERALIZED ANXIETY DISORDER: ICD-10-CM

## 2020-06-02 DIAGNOSIS — I49.3 PVC (PREMATURE VENTRICULAR CONTRACTION): ICD-10-CM

## 2020-06-02 DIAGNOSIS — F41.1 GENERALIZED ANXIETY DISORDER: ICD-10-CM

## 2020-06-02 RX ORDER — ALPRAZOLAM 1 MG/1
1 TABLET ORAL
Qty: 30 TABLET | Refills: 0 | Status: SHIPPED | OUTPATIENT
Start: 2020-06-02 | End: 2020-10-20 | Stop reason: SDUPTHER

## 2020-06-02 RX ORDER — METOPROLOL SUCCINATE 50 MG/1
50 TABLET, EXTENDED RELEASE ORAL DAILY
Qty: 30 TABLET | Refills: 5 | Status: SHIPPED | OUTPATIENT
Start: 2020-06-02 | End: 2020-12-29 | Stop reason: SDUPTHER

## 2020-06-02 RX ORDER — DULOXETIN HYDROCHLORIDE 60 MG/1
60 CAPSULE, DELAYED RELEASE ORAL 2 TIMES DAILY
Qty: 60 CAPSULE | Refills: 5 | Status: SHIPPED | OUTPATIENT
Start: 2020-06-02 | End: 2020-11-12

## 2020-06-16 DIAGNOSIS — R63.5 WEIGHT GAIN: ICD-10-CM

## 2020-06-16 DIAGNOSIS — F17.200 SMOKING: ICD-10-CM

## 2020-06-16 RX ORDER — BUPROPION HYDROCHLORIDE 300 MG/1
300 TABLET ORAL DAILY
Qty: 30 TABLET | Refills: 5 | Status: SHIPPED | OUTPATIENT
Start: 2020-06-16 | End: 2020-11-12

## 2020-08-13 DIAGNOSIS — F41.1 GENERALIZED ANXIETY DISORDER: ICD-10-CM

## 2020-08-13 RX ORDER — ARIPIPRAZOLE 10 MG/1
TABLET ORAL
Qty: 30 TABLET | Refills: 2 | Status: SHIPPED | OUTPATIENT
Start: 2020-08-13

## 2020-09-14 DIAGNOSIS — F51.01 PRIMARY INSOMNIA: ICD-10-CM

## 2020-09-14 RX ORDER — TRAZODONE HYDROCHLORIDE 150 MG/1
TABLET ORAL
Qty: 30 TABLET | Refills: 5 | Status: SHIPPED | OUTPATIENT
Start: 2020-09-14 | End: 2020-11-12 | Stop reason: SDUPTHER

## 2020-10-20 DIAGNOSIS — I10 BENIGN ESSENTIAL HYPERTENSION: ICD-10-CM

## 2020-10-20 DIAGNOSIS — F41.1 GENERALIZED ANXIETY DISORDER: ICD-10-CM

## 2020-10-20 RX ORDER — ALPRAZOLAM 1 MG/1
1 TABLET ORAL
Qty: 30 TABLET | Refills: 0 | Status: SHIPPED | OUTPATIENT
Start: 2020-10-20 | End: 2020-12-29 | Stop reason: SDUPTHER

## 2020-10-20 RX ORDER — LISINOPRIL AND HYDROCHLOROTHIAZIDE 12.5; 1 MG/1; MG/1
TABLET ORAL
Qty: 30 TABLET | Refills: 5 | Status: SHIPPED | OUTPATIENT
Start: 2020-10-20 | End: 2021-09-18 | Stop reason: SDUPTHER

## 2020-11-12 ENCOUNTER — OFFICE VISIT (OUTPATIENT)
Dept: INTERNAL MEDICINE CLINIC | Facility: CLINIC | Age: 45
End: 2020-11-12
Payer: COMMERCIAL

## 2020-11-12 VITALS
HEART RATE: 93 BPM | BODY MASS INDEX: 42.36 KG/M2 | RESPIRATION RATE: 14 BRPM | TEMPERATURE: 99.9 F | HEIGHT: 62 IN | SYSTOLIC BLOOD PRESSURE: 158 MMHG | DIASTOLIC BLOOD PRESSURE: 84 MMHG | OXYGEN SATURATION: 99 % | WEIGHT: 230.2 LBS

## 2020-11-12 DIAGNOSIS — F51.01 PRIMARY INSOMNIA: ICD-10-CM

## 2020-11-12 DIAGNOSIS — L98.9 SKIN LESION: ICD-10-CM

## 2020-11-12 DIAGNOSIS — Z23 ENCOUNTER FOR ADMINISTRATION OF VACCINE: ICD-10-CM

## 2020-11-12 DIAGNOSIS — J44.9 CHRONIC OBSTRUCTIVE PULMONARY DISEASE, UNSPECIFIED COPD TYPE (HCC): ICD-10-CM

## 2020-11-12 DIAGNOSIS — F33.1 MODERATE EPISODE OF RECURRENT MAJOR DEPRESSIVE DISORDER (HCC): Primary | ICD-10-CM

## 2020-11-12 DIAGNOSIS — N64.4 BREAST PAIN: ICD-10-CM

## 2020-11-12 PROCEDURE — 99214 OFFICE O/P EST MOD 30 MIN: CPT | Performed by: PHYSICIAN ASSISTANT

## 2020-11-12 PROCEDURE — 3079F DIAST BP 80-89 MM HG: CPT | Performed by: PHYSICIAN ASSISTANT

## 2020-11-12 PROCEDURE — 90686 IIV4 VACC NO PRSV 0.5 ML IM: CPT | Performed by: PHYSICIAN ASSISTANT

## 2020-11-12 PROCEDURE — 3008F BODY MASS INDEX DOCD: CPT | Performed by: PHYSICIAN ASSISTANT

## 2020-11-12 PROCEDURE — 3725F SCREEN DEPRESSION PERFORMED: CPT | Performed by: PHYSICIAN ASSISTANT

## 2020-11-12 PROCEDURE — 4004F PT TOBACCO SCREEN RCVD TLK: CPT | Performed by: PHYSICIAN ASSISTANT

## 2020-11-12 PROCEDURE — 3077F SYST BP >= 140 MM HG: CPT | Performed by: PHYSICIAN ASSISTANT

## 2020-11-12 PROCEDURE — 90471 IMMUNIZATION ADMIN: CPT | Performed by: PHYSICIAN ASSISTANT

## 2020-11-12 RX ORDER — TRAZODONE HYDROCHLORIDE 300 MG/1
300 TABLET ORAL
Qty: 30 TABLET | Refills: 2 | Status: SHIPPED | OUTPATIENT
Start: 2020-11-12 | End: 2021-02-05

## 2020-11-12 RX ORDER — FLUTICASONE FUROATE AND VILANTEROL 100; 25 UG/1; UG/1
1 POWDER RESPIRATORY (INHALATION) DAILY
Qty: 1 INHALER | Refills: 2 | Status: SHIPPED | OUTPATIENT
Start: 2020-11-12 | End: 2021-02-05

## 2020-12-09 DIAGNOSIS — F41.1 GENERALIZED ANXIETY DISORDER: Primary | ICD-10-CM

## 2020-12-09 RX ORDER — DULOXETIN HYDROCHLORIDE 60 MG/1
60 CAPSULE, DELAYED RELEASE ORAL 2 TIMES DAILY
Qty: 60 CAPSULE | Refills: 5 | Status: SHIPPED | OUTPATIENT
Start: 2020-12-09 | End: 2021-09-18 | Stop reason: SDUPTHER

## 2020-12-09 RX ORDER — DULOXETIN HYDROCHLORIDE 60 MG/1
60 CAPSULE, DELAYED RELEASE ORAL 2 TIMES DAILY
COMMUNITY
End: 2020-12-09 | Stop reason: SDUPTHER

## 2020-12-29 DIAGNOSIS — F41.1 GENERALIZED ANXIETY DISORDER: ICD-10-CM

## 2020-12-29 DIAGNOSIS — I49.3 PVC (PREMATURE VENTRICULAR CONTRACTION): ICD-10-CM

## 2020-12-29 RX ORDER — ALPRAZOLAM 1 MG/1
1 TABLET ORAL
Qty: 30 TABLET | Refills: 0 | Status: SHIPPED | OUTPATIENT
Start: 2020-12-29 | End: 2021-02-17 | Stop reason: SDUPTHER

## 2020-12-29 RX ORDER — METOPROLOL SUCCINATE 50 MG/1
50 TABLET, EXTENDED RELEASE ORAL DAILY
Qty: 30 TABLET | Refills: 5 | Status: SHIPPED | OUTPATIENT
Start: 2020-12-29 | End: 2021-07-29 | Stop reason: SDUPTHER

## 2021-01-07 ENCOUNTER — OFFICE VISIT (OUTPATIENT)
Dept: INTERNAL MEDICINE CLINIC | Facility: CLINIC | Age: 46
End: 2021-01-07
Payer: COMMERCIAL

## 2021-01-07 VITALS
RESPIRATION RATE: 18 BRPM | SYSTOLIC BLOOD PRESSURE: 120 MMHG | HEIGHT: 62 IN | BODY MASS INDEX: 42.88 KG/M2 | WEIGHT: 233 LBS | DIASTOLIC BLOOD PRESSURE: 72 MMHG | TEMPERATURE: 97.6 F | HEART RATE: 78 BPM | OXYGEN SATURATION: 98 %

## 2021-01-07 DIAGNOSIS — D23.9 DERMATOFIBROMA: Primary | ICD-10-CM

## 2021-01-07 PROCEDURE — 88305 TISSUE EXAM BY PATHOLOGIST: CPT | Performed by: STUDENT IN AN ORGANIZED HEALTH CARE EDUCATION/TRAINING PROGRAM

## 2021-01-07 PROCEDURE — 99213 OFFICE O/P EST LOW 20 MIN: CPT | Performed by: PHYSICIAN ASSISTANT

## 2021-01-07 NOTE — PROGRESS NOTES
Assessment/Plan:  Problem List Items Addressed This Visit     None           There are no diagnoses linked to this encounter  No problem-specific Assessment & Plan notes found for this encounter  Subjective:      Patient ID: Lola Fam is a 39 y o  female  Pt presents for lesion removal from her left back near her scapula  Lesion present x months  No pain  It is subcutaneous and rubbery  She also notes improvement in her mood with cymbalta use  The following portions of the patient's history were reviewed and updated as appropriate:   She has a past medical history of Depression, Generalized anxiety disorder, H/O: hysterectomy, Hypertension, Obesity, Ovarian cyst, PVC (premature ventricular contraction), and Urinary tract infection  ,  does not have any pertinent problems on file  ,   has a past surgical history that includes Hysterectomy and Tumor removal ,  family history includes Diabetes in her mother; Mental illness in her paternal aunt and paternal uncle; Substance Abuse in her paternal aunt and paternal uncle ,   reports that she has been smoking e-cigarettes and cigarettes  She has been smoking about 1 00 pack per day  She has never used smokeless tobacco  She reports current alcohol use  She reports that she does not use drugs  ,  is allergic to nicotine polacrilex [nicotine] and fluoxetine     Current Outpatient Medications   Medication Sig Dispense Refill    albuterol (VENTOLIN HFA) 90 mcg/act inhaler Inhale 2 puffs every 4 (four) hours as needed for wheezing 1 Inhaler 5    ALPRAZolam (XANAX) 1 mg tablet Take 1 tablet (1 mg total) by mouth daily at bedtime as needed for anxiety 30 tablet 0    ARIPiprazole (ABILIFY) 10 mg tablet TAKE 1 TABLET BY MOUTH EVERY DAY 30 tablet 2    DULoxetine (CYMBALTA) 60 mg delayed release capsule Take 1 capsule (60 mg total) by mouth 2 (two) times a day 60 capsule 5    fluticasone-vilanterol (BREO ELLIPTA) 100-25 mcg/inh inhaler Inhale 1 puff daily Rinse mouth after use  1 Inhaler 2    lisinopril-hydrochlorothiazide (PRINZIDE,ZESTORETIC) 10-12 5 MG per tablet TAKE 1 TABLET BY MOUTH EVERY DAY 30 tablet 5    metoprolol succinate (TOPROL-XL) 50 mg 24 hr tablet Take 1 tablet (50 mg total) by mouth daily 30 tablet 5    NON FORMULARY MEDICAL MARIJUANA      traZODone (DESYREL) 300 MG tablet Take 1 tablet (300 mg total) by mouth daily at bedtime 30 tablet 2     No current facility-administered medications for this visit  Review of Systems   Constitutional: Negative for chills and fever  HENT: Negative for congestion, ear pain, hearing loss, postnasal drip, rhinorrhea, sinus pressure, sinus pain, sore throat and trouble swallowing  Eyes: Negative for pain and visual disturbance  Respiratory: Negative for cough, chest tightness, shortness of breath and wheezing  Cardiovascular: Negative  Negative for chest pain, palpitations and leg swelling  Gastrointestinal: Negative for abdominal pain, blood in stool, constipation, diarrhea, nausea and vomiting  Endocrine: Negative for cold intolerance, heat intolerance, polydipsia, polyphagia and polyuria  Genitourinary: Negative for difficulty urinating, dysuria, flank pain and urgency  Musculoskeletal: Negative for arthralgias, back pain, gait problem and myalgias  Skin: Positive for wound  Negative for rash  Allergic/Immunologic: Negative  Neurological: Negative for dizziness, weakness, light-headedness and headaches  Hematological: Negative  Psychiatric/Behavioral: Negative for behavioral problems, dysphoric mood and sleep disturbance  The patient is not nervous/anxious            PHQ-9 Depression Screening    PHQ-9:   Frequency of the following problems over the past two weeks:              Objective:  Vitals:    01/07/21 1127   BP: 120/72   Pulse: 78   Resp: 18   Temp: 97 6 °F (36 4 °C)   TempSrc: Tympanic   SpO2: 98%   Weight: 106 kg (233 lb)   Height: 5' 2" (1 575 m)     Body mass index is 42 62 kg/m²  Physical Exam  Nursing note reviewed  Constitutional:       General: She is not in acute distress  Appearance: She is well-developed  She is not diaphoretic  HENT:      Head: Normocephalic and atraumatic  Right Ear: External ear normal       Left Ear: External ear normal       Nose: Nose normal       Mouth/Throat:      Pharynx: No oropharyngeal exudate  Eyes:      General: No scleral icterus  Right eye: No discharge  Left eye: No discharge  Conjunctiva/sclera: Conjunctivae normal       Pupils: Pupils are equal, round, and reactive to light  Neck:      Musculoskeletal: Normal range of motion and neck supple  Thyroid: No thyromegaly  Cardiovascular:      Rate and Rhythm: Normal rate and regular rhythm  Heart sounds: Normal heart sounds  No murmur  No friction rub  No gallop  Pulmonary:      Effort: Pulmonary effort is normal  No respiratory distress  Breath sounds: Normal breath sounds  No wheezing or rales  Abdominal:      General: Bowel sounds are normal  There is no distension  Palpations: Abdomen is soft  Tenderness: There is no abdominal tenderness  Musculoskeletal: Normal range of motion  General: No tenderness or deformity  Skin:     General: Skin is warm and dry  Neurological:      Mental Status: She is alert and oriented to person, place, and time  Cranial Nerves: No cranial nerve deficit  Psychiatric:         Behavior: Behavior normal          Thought Content: Thought content normal          Judgment: Judgment normal            Skin excision    Date/Time: 1/7/2021 12:07 PM  Performed by: Kate Harris PA-C  Authorized by: Kate Harris PA-C   [place] Protocol:  Procedure performed by:  Consent: Verbal consent obtained    Risks and benefits: risks, benefits and alternatives were discussed  Consent given by: patient      Procedure Details - Skin Excision:     Number of Lesions: 1  Lesion 1:     Body area:  Trunk    Trunk location:  Back       Final defect size (mm):  5    Malignancy: malignancy unknown      Destruction method: scissors used for extraction       Repair Comments: Area cleaned with alcohol prep pad  Anesthetized with 1cc 1% xylocaine  5mm bunch piopsy used  Hemostasis performed with pressure and silver nitrate stick  Pt tolerated well

## 2021-02-05 DIAGNOSIS — F51.01 PRIMARY INSOMNIA: ICD-10-CM

## 2021-02-05 DIAGNOSIS — J44.9 CHRONIC OBSTRUCTIVE PULMONARY DISEASE, UNSPECIFIED COPD TYPE (HCC): ICD-10-CM

## 2021-02-05 RX ORDER — FLUTICASONE FUROATE AND VILANTEROL TRIFENATATE 100; 25 UG/1; UG/1
POWDER RESPIRATORY (INHALATION)
Qty: 1 INHALER | Refills: 5 | Status: SHIPPED | OUTPATIENT
Start: 2021-02-05

## 2021-02-05 RX ORDER — TRAZODONE HYDROCHLORIDE 300 MG/1
TABLET ORAL
Qty: 30 TABLET | Refills: 5 | Status: SHIPPED | OUTPATIENT
Start: 2021-02-05 | End: 2021-09-18 | Stop reason: SDUPTHER

## 2021-02-17 DIAGNOSIS — F41.1 GENERALIZED ANXIETY DISORDER: ICD-10-CM

## 2021-02-17 RX ORDER — ALPRAZOLAM 1 MG/1
1 TABLET ORAL
Qty: 30 TABLET | Refills: 0 | Status: SHIPPED | OUTPATIENT
Start: 2021-02-17 | End: 2021-05-26 | Stop reason: SDUPTHER

## 2021-03-10 DIAGNOSIS — Z23 ENCOUNTER FOR IMMUNIZATION: ICD-10-CM

## 2021-04-07 ENCOUNTER — IMMUNIZATIONS (OUTPATIENT)
Dept: FAMILY MEDICINE CLINIC | Facility: HOSPITAL | Age: 46
End: 2021-04-07

## 2021-04-07 DIAGNOSIS — Z23 ENCOUNTER FOR IMMUNIZATION: Primary | ICD-10-CM

## 2021-04-07 PROCEDURE — 0011A SARS-COV-2 / COVID-19 MRNA VACCINE (MODERNA) 100 MCG: CPT

## 2021-04-07 PROCEDURE — 91301 SARS-COV-2 / COVID-19 MRNA VACCINE (MODERNA) 100 MCG: CPT

## 2021-05-05 ENCOUNTER — IMMUNIZATIONS (OUTPATIENT)
Dept: FAMILY MEDICINE CLINIC | Facility: HOSPITAL | Age: 46
End: 2021-05-05

## 2021-05-05 DIAGNOSIS — Z23 ENCOUNTER FOR IMMUNIZATION: Primary | ICD-10-CM

## 2021-05-05 PROCEDURE — 91301 SARS-COV-2 / COVID-19 MRNA VACCINE (MODERNA) 100 MCG: CPT

## 2021-05-05 PROCEDURE — 0012A SARS-COV-2 / COVID-19 MRNA VACCINE (MODERNA) 100 MCG: CPT

## 2021-05-26 DIAGNOSIS — F41.1 GENERALIZED ANXIETY DISORDER: ICD-10-CM

## 2021-05-26 RX ORDER — ALPRAZOLAM 1 MG/1
1 TABLET ORAL
Qty: 30 TABLET | Refills: 0 | Status: SHIPPED | OUTPATIENT
Start: 2021-05-26 | End: 2021-07-29 | Stop reason: SDUPTHER

## 2021-07-07 ENCOUNTER — OFFICE VISIT (OUTPATIENT)
Dept: INTERNAL MEDICINE CLINIC | Facility: CLINIC | Age: 46
End: 2021-07-07
Payer: COMMERCIAL

## 2021-07-07 VITALS
HEIGHT: 62 IN | HEART RATE: 68 BPM | SYSTOLIC BLOOD PRESSURE: 108 MMHG | BODY MASS INDEX: 41.77 KG/M2 | RESPIRATION RATE: 18 BRPM | DIASTOLIC BLOOD PRESSURE: 70 MMHG | OXYGEN SATURATION: 95 % | TEMPERATURE: 97.7 F | WEIGHT: 227 LBS

## 2021-07-07 DIAGNOSIS — Z13.220 SCREENING, LIPID: ICD-10-CM

## 2021-07-07 DIAGNOSIS — E55.9 VITAMIN D DEFICIENCY: ICD-10-CM

## 2021-07-07 DIAGNOSIS — E66.01 CLASS 3 SEVERE OBESITY DUE TO EXCESS CALORIES WITHOUT SERIOUS COMORBIDITY WITH BODY MASS INDEX (BMI) OF 40.0 TO 44.9 IN ADULT (HCC): Primary | ICD-10-CM

## 2021-07-07 DIAGNOSIS — Z13.29 SCREENING FOR THYROID DISORDER: ICD-10-CM

## 2021-07-07 DIAGNOSIS — R06.83 SNORING: ICD-10-CM

## 2021-07-07 DIAGNOSIS — Z13.0 SCREENING FOR DEFICIENCY ANEMIA: ICD-10-CM

## 2021-07-07 DIAGNOSIS — E78.5 DYSLIPIDEMIA: ICD-10-CM

## 2021-07-07 DIAGNOSIS — Z13.1 SCREENING FOR DIABETES MELLITUS: ICD-10-CM

## 2021-07-07 PROCEDURE — 3008F BODY MASS INDEX DOCD: CPT | Performed by: PHYSICIAN ASSISTANT

## 2021-07-07 PROCEDURE — 99214 OFFICE O/P EST MOD 30 MIN: CPT | Performed by: PHYSICIAN ASSISTANT

## 2021-07-07 PROCEDURE — 4004F PT TOBACCO SCREEN RCVD TLK: CPT | Performed by: PHYSICIAN ASSISTANT

## 2021-07-07 RX ORDER — PHENTERMINE HYDROCHLORIDE 37.5 MG/1
37.5 CAPSULE ORAL EVERY MORNING
Qty: 30 CAPSULE | Refills: 2 | Status: SHIPPED | OUTPATIENT
Start: 2021-07-07 | End: 2021-09-08 | Stop reason: SDUPTHER

## 2021-07-08 PROBLEM — E66.01 CLASS 3 SEVERE OBESITY DUE TO EXCESS CALORIES WITHOUT SERIOUS COMORBIDITY WITH BODY MASS INDEX (BMI) OF 40.0 TO 44.9 IN ADULT (HCC): Status: ACTIVE | Noted: 2021-07-08

## 2021-07-08 PROBLEM — R06.83 SNORING: Status: ACTIVE | Noted: 2021-07-08

## 2021-07-08 PROBLEM — E66.813 CLASS 3 SEVERE OBESITY DUE TO EXCESS CALORIES WITHOUT SERIOUS COMORBIDITY WITH BODY MASS INDEX (BMI) OF 40.0 TO 44.9 IN ADULT (HCC): Status: ACTIVE | Noted: 2021-07-08

## 2021-07-08 NOTE — ASSESSMENT & PLAN NOTE
Start phentermine  Pt counseled on short term use of this medication and verbalized understanding  Continue diet and exercise     Update labs

## 2021-07-08 NOTE — PROGRESS NOTES
Assessment/Plan:  Problem List Items Addressed This Visit        Other    Dyslipidemia    Vitamin D deficiency    Relevant Orders    Vitamin D 25 hydroxy    Class 3 severe obesity due to excess calories without serious comorbidity with body mass index (BMI) of 40 0 to 44 9 in adult [facility]) - Primary     Start phentermine  Pt counseled on short term use of this medication and verbalized understanding  Continue diet and exercise  Update labs         Relevant Medications    phentermine 37 5 MG capsule    Other Relevant Orders    Diagnostic Sleep Study    Snoring    Relevant Orders    Diagnostic Sleep Study      Other Visit Diagnoses     Screening for deficiency anemia        Relevant Orders    CBC and differential    Screening for diabetes mellitus        Relevant Orders    Comprehensive metabolic panel    Screening for thyroid disorder        Relevant Orders    TSH, 3rd generation with Free T4 reflex    Screening, lipid        Relevant Orders    Lipid panel           Diagnoses and all orders for this visit:    Class 3 severe obesity due to excess calories without serious comorbidity with body mass index (BMI) of 40 0 to 44 9 in adult ([facility])  -     phentermine 37 5 MG capsule; Take 1 capsule (37 5 mg total) by mouth every morning  -     Diagnostic Sleep Study; Future    Vitamin D deficiency  -     Vitamin D 25 hydroxy; Future    Dyslipidemia    Screening for deficiency anemia  -     CBC and differential; Future    Screening for diabetes mellitus  -     Comprehensive metabolic panel; Future    Screening for thyroid disorder  -     TSH, 3rd generation with Free T4 reflex; Future    Screening, lipid  -     Lipid panel; Future    Snoring  -     Diagnostic Sleep Study; Future        Class 3 severe obesity due to excess calories without serious comorbidity with body mass index (BMI) of 40 0 to 44 9 in adult [facility])  Start phentermine  Pt counseled on short term use of this medication and verbalized understanding   Continue diet and exercise  Update labs      Subjective:      Patient ID: Suzie Flores is a 55 y o  female  Pt presents for routine visit  She is doing well overall  She is in the process of quitting smoking  BP is normal  She is due for labs  She is covid vaccinated  She is frustrated with her weight  She has been trying to lose on her own and is down 6lb since her last visit  She tried both wellbutrin and topamax with little results  She also relates snoring, trouble sleeping and having daytime sleepiness  She would be agreeable to a sleep study      The following portions of the patient's history were reviewed and updated as appropriate:   She has a past medical history of Depression, Generalized anxiety disorder, H/O: hysterectomy, Hypertension, Obesity, Ovarian cyst, PVC (premature ventricular contraction), and Urinary tract infection  ,  does not have any pertinent problems on file  ,   has a past surgical history that includes Hysterectomy and Tumor removal ,  family history includes Diabetes in her mother; Mental illness in her paternal aunt and paternal uncle; Substance Abuse in her paternal aunt and paternal uncle ,   reports that she has been smoking e-cigarettes and cigarettes  She has been smoking about 1 00 pack per day  She has never used smokeless tobacco  She reports current alcohol use  She reports that she does not use drugs  ,  is allergic to nicotine polacrilex [nicotine] and fluoxetine     Current Outpatient Medications   Medication Sig Dispense Refill    albuterol (VENTOLIN HFA) 90 mcg/act inhaler Inhale 2 puffs every 4 (four) hours as needed for wheezing 1 Inhaler 5    ALPRAZolam (XANAX) 1 mg tablet Take 1 tablet (1 mg total) by mouth daily at bedtime as needed for anxiety 30 tablet 0    ARIPiprazole (ABILIFY) 10 mg tablet TAKE 1 TABLET BY MOUTH EVERY DAY 30 tablet 2    Breo Ellipta 100-25 MCG/INH inhaler INHALE 1 PUFF DAILY RINSE MOUTH AFTER USE  1 Inhaler 5    DULoxetine (CYMBALTA) 60 mg delayed release capsule Take 1 capsule (60 mg total) by mouth 2 (two) times a day 60 capsule 5    lisinopril-hydrochlorothiazide (PRINZIDE,ZESTORETIC) 10-12 5 MG per tablet TAKE 1 TABLET BY MOUTH EVERY DAY 30 tablet 5    metoprolol succinate (TOPROL-XL) 50 mg 24 hr tablet Take 1 tablet (50 mg total) by mouth daily 30 tablet 5    NON FORMULARY MEDICAL MARIJUANA      traZODone (DESYREL) 300 MG tablet TAKE 1 TABLET BY MOUTH DAILY AT BEDTIME 30 tablet 5    phentermine 37 5 MG capsule Take 1 capsule (37 5 mg total) by mouth every morning 30 capsule 2     No current facility-administered medications for this visit  Review of Systems   Constitutional: Negative for chills and fever  HENT: Negative for congestion, ear pain, hearing loss, postnasal drip, rhinorrhea, sinus pressure, sinus pain, sore throat and trouble swallowing  Eyes: Negative for pain and visual disturbance  Respiratory: Negative for cough, chest tightness, shortness of breath and wheezing  Cardiovascular: Negative  Negative for chest pain, palpitations and leg swelling  Gastrointestinal: Negative for abdominal pain, blood in stool, constipation, diarrhea, nausea and vomiting  Endocrine: Negative for cold intolerance, heat intolerance, polydipsia, polyphagia and polyuria  Genitourinary: Negative for difficulty urinating, dysuria, flank pain and urgency  Musculoskeletal: Negative for arthralgias, back pain, gait problem and myalgias  Skin: Negative for rash  Allergic/Immunologic: Negative  Neurological: Negative for dizziness, weakness, light-headedness and headaches  Hematological: Negative  Psychiatric/Behavioral: Negative for behavioral problems, dysphoric mood and sleep disturbance  The patient is not nervous/anxious            PHQ-9 Depression Screening    PHQ-9:   Frequency of the following problems over the past two weeks:              Objective:  Vitals:    07/07/21 1453   BP: 108/70   Pulse: 68   Resp: 18 Temp: 97 7 °F (36 5 °C)   TempSrc: Tympanic   SpO2: 95%   Weight: 103 kg (227 lb)   Height: 5' 2" (1 575 m)     Body mass index is 41 52 kg/m²  Physical Exam  Constitutional:       General: She is not in acute distress  Appearance: She is well-developed  She is obese  She is not diaphoretic  HENT:      Head: Normocephalic and atraumatic  Right Ear: External ear normal       Left Ear: External ear normal       Nose: Nose normal       Mouth/Throat:      Pharynx: No oropharyngeal exudate  Eyes:      General: No scleral icterus  Right eye: No discharge  Left eye: No discharge  Conjunctiva/sclera: Conjunctivae normal       Pupils: Pupils are equal, round, and reactive to light  Neck:      Thyroid: No thyromegaly  Cardiovascular:      Rate and Rhythm: Normal rate and regular rhythm  Heart sounds: Normal heart sounds  No murmur heard  No friction rub  No gallop  Pulmonary:      Effort: Pulmonary effort is normal  No respiratory distress  Breath sounds: Normal breath sounds  No wheezing or rales  Abdominal:      General: Bowel sounds are normal  There is no distension  Palpations: Abdomen is soft  Tenderness: There is no abdominal tenderness  Musculoskeletal:         General: No tenderness or deformity  Normal range of motion  Cervical back: Normal range of motion and neck supple  Skin:     General: Skin is warm and dry  Neurological:      Mental Status: She is alert and oriented to person, place, and time  Cranial Nerves: No cranial nerve deficit  Psychiatric:         Behavior: Behavior normal          Thought Content:  Thought content normal          Judgment: Judgment normal

## 2021-07-15 ENCOUNTER — TELEPHONE (OUTPATIENT)
Dept: INTERNAL MEDICINE CLINIC | Facility: CLINIC | Age: 46
End: 2021-07-15

## 2021-07-23 NOTE — TELEPHONE ENCOUNTER
I can order but usually this decision is up to her insurance company and which they prefer to cover, would anyone in the clerical team know with her insurance?

## 2021-07-26 NOTE — TELEPHONE ENCOUNTER
We do not know what they cover-after it is ordered -then they check to see if it needs prior author or not-usually the home study does not require author but I cannot be sure  Our team that does the authorizations call for this if it is scheduled at a [facility]

## 2021-07-27 DIAGNOSIS — R06.83 SNORING: Primary | ICD-10-CM

## 2021-07-29 DIAGNOSIS — I49.3 PVC (PREMATURE VENTRICULAR CONTRACTION): ICD-10-CM

## 2021-07-29 DIAGNOSIS — F17.200 SMOKING: ICD-10-CM

## 2021-07-29 DIAGNOSIS — F41.1 GENERALIZED ANXIETY DISORDER: ICD-10-CM

## 2021-07-29 RX ORDER — ALPRAZOLAM 1 MG/1
1 TABLET ORAL
Qty: 30 TABLET | Refills: 0 | Status: SHIPPED | OUTPATIENT
Start: 2021-07-29 | End: 2021-09-18 | Stop reason: SDUPTHER

## 2021-07-29 RX ORDER — METOPROLOL SUCCINATE 50 MG/1
50 TABLET, EXTENDED RELEASE ORAL DAILY
Qty: 30 TABLET | Refills: 5 | Status: SHIPPED | OUTPATIENT
Start: 2021-07-29 | End: 2022-01-24

## 2021-07-30 ENCOUNTER — TELEPHONE (OUTPATIENT)
Dept: SLEEP CENTER | Facility: CLINIC | Age: 46
End: 2021-07-30

## 2021-07-30 NOTE — TELEPHONE ENCOUNTER
----- Message from Etelvina Cervantes MD sent at 7/30/2021  3:25 PM EDT -----  Approved  ----- Message -----  From: Red Ward  Sent: 7/30/2021   8:18 AM EDT  To: Sleep Medicine [place] Provider    This sleep study needs approval      If approved please sign and return to clerical pool  If denied please include reasons why  Also provide alternative testing if warranted  Please sign and return to clerical pool

## 2021-08-29 ENCOUNTER — HOSPITAL ENCOUNTER (OUTPATIENT)
Dept: SLEEP CENTER | Facility: HOSPITAL | Age: 46
Discharge: HOME | End: 2021-08-29
Payer: COMMERCIAL

## 2021-08-29 DIAGNOSIS — R06.83 SNORING: ICD-10-CM

## 2021-08-29 PROCEDURE — G0399 HOME SLEEP TEST/TYPE 3 PORTA: HCPCS

## 2021-08-30 NOTE — PROGRESS NOTES
Home Sleep Study Documentation    Pre-Sleep Home Study:    Set-up and instructions performed by: BELKIS Ley RRT    Technician performed demonstration for Patient: yes    Return demonstration performed by Patient: yes    Written instructions provided to Patient: yes    Patient signed consent form: yes        Post-Sleep Home Study:    Additional comments by Patient:n/a    Home Sleep Study Failed:no:    Failure reason: N/A    Reported or Detected: N/A    BELKIS Ley RRT

## 2021-09-02 ENCOUNTER — TELEPHONE (OUTPATIENT)
Dept: SLEEP CENTER | Facility: CLINIC | Age: 46
End: 2021-09-02

## 2021-09-08 DIAGNOSIS — E66.01 CLASS 3 SEVERE OBESITY DUE TO EXCESS CALORIES WITHOUT SERIOUS COMORBIDITY WITH BODY MASS INDEX (BMI) OF 40.0 TO 44.9 IN ADULT (HCC): ICD-10-CM

## 2021-09-08 RX ORDER — PHENTERMINE HYDROCHLORIDE 37.5 MG/1
37.5 CAPSULE ORAL EVERY MORNING
Qty: 30 CAPSULE | Refills: 2 | Status: SHIPPED | OUTPATIENT
Start: 2021-09-08 | End: 2021-12-10 | Stop reason: SDUPTHER

## 2021-09-17 ENCOUNTER — TELEPHONE (OUTPATIENT)
Dept: INTERNAL MEDICINE CLINIC | Facility: CLINIC | Age: 46
End: 2021-09-17

## 2021-09-18 ENCOUNTER — OFFICE VISIT (OUTPATIENT)
Dept: INTERNAL MEDICINE CLINIC | Facility: CLINIC | Age: 46
End: 2021-09-18
Payer: COMMERCIAL

## 2021-09-18 VITALS
DIASTOLIC BLOOD PRESSURE: 80 MMHG | HEIGHT: 62 IN | SYSTOLIC BLOOD PRESSURE: 120 MMHG | TEMPERATURE: 97.3 F | HEART RATE: 84 BPM | OXYGEN SATURATION: 95 % | WEIGHT: 211.5 LBS | BODY MASS INDEX: 38.92 KG/M2

## 2021-09-18 DIAGNOSIS — F41.1 GENERALIZED ANXIETY DISORDER: ICD-10-CM

## 2021-09-18 DIAGNOSIS — Z23 NEED FOR VACCINATION: ICD-10-CM

## 2021-09-18 DIAGNOSIS — I10 BENIGN ESSENTIAL HYPERTENSION: Primary | ICD-10-CM

## 2021-09-18 DIAGNOSIS — F51.01 PRIMARY INSOMNIA: ICD-10-CM

## 2021-09-18 DIAGNOSIS — G47.33 OSA (OBSTRUCTIVE SLEEP APNEA): ICD-10-CM

## 2021-09-18 PROCEDURE — 90686 IIV4 VACC NO PRSV 0.5 ML IM: CPT | Performed by: PHYSICIAN ASSISTANT

## 2021-09-18 PROCEDURE — 3008F BODY MASS INDEX DOCD: CPT | Performed by: PHYSICIAN ASSISTANT

## 2021-09-18 PROCEDURE — 90471 IMMUNIZATION ADMIN: CPT | Performed by: PHYSICIAN ASSISTANT

## 2021-09-18 PROCEDURE — 99213 OFFICE O/P EST LOW 20 MIN: CPT | Performed by: PHYSICIAN ASSISTANT

## 2021-09-18 PROCEDURE — 4004F PT TOBACCO SCREEN RCVD TLK: CPT | Performed by: PHYSICIAN ASSISTANT

## 2021-09-18 RX ORDER — DULOXETIN HYDROCHLORIDE 60 MG/1
60 CAPSULE, DELAYED RELEASE ORAL 2 TIMES DAILY
Qty: 60 CAPSULE | Refills: 5 | Status: SHIPPED | OUTPATIENT
Start: 2021-09-18 | End: 2022-03-21

## 2021-09-18 RX ORDER — LISINOPRIL AND HYDROCHLOROTHIAZIDE 12.5; 1 MG/1; MG/1
1 TABLET ORAL DAILY
Qty: 30 TABLET | Refills: 5 | Status: SHIPPED | OUTPATIENT
Start: 2021-09-18 | End: 2022-03-21

## 2021-09-18 RX ORDER — ALPRAZOLAM 1 MG/1
1 TABLET ORAL
Qty: 30 TABLET | Refills: 0 | Status: SHIPPED | OUTPATIENT
Start: 2021-09-18 | End: 2021-11-24 | Stop reason: SDUPTHER

## 2021-09-18 RX ORDER — TRAZODONE HYDROCHLORIDE 300 MG/1
300 TABLET ORAL
Qty: 30 TABLET | Refills: 5 | Status: SHIPPED | OUTPATIENT
Start: 2021-09-18 | End: 2022-03-21

## 2021-09-20 NOTE — PROGRESS NOTES
Assessment/Plan:  Problem List Items Addressed This Visit        Respiratory    ALFONSO (obstructive sleep apnea)       Cardiovascular and Mediastinum    Benign essential hypertension - Primary     Pts symptoms are stable with current regime  No changes at present  Relevant Medications    lisinopril-hydrochlorothiazide (PRINZIDE,ZESTORETIC) 10-12 5 MG per tablet       Other    Generalized anxiety disorder    Relevant Medications    DULoxetine (CYMBALTA) 60 mg delayed release capsule    ALPRAZolam (XANAX) 1 mg tablet    traZODone (DESYREL) 300 MG tablet    Primary insomnia    Relevant Medications    traZODone (DESYREL) 300 MG tablet      Other Visit Diagnoses     Need for vaccination        Relevant Orders    influenza vaccine, quadrivalent, 0 5 mL, preservative-free, for adult and pediatric patients 6 mos+ (AFLURIA, FLUARIX, FLULAVAL, FLUZONE) (Completed)           Diagnoses and all orders for this visit:    Benign essential hypertension  -     lisinopril-hydrochlorothiazide (PRINZIDE,ZESTORETIC) 10-12 5 MG per tablet; Take 1 tablet by mouth daily    Generalized anxiety disorder  -     DULoxetine (CYMBALTA) 60 mg delayed release capsule; Take 1 capsule (60 mg total) by mouth 2 (two) times a day  -     ALPRAZolam (XANAX) 1 mg tablet; Take 1 tablet (1 mg total) by mouth daily at bedtime as needed for anxiety    Primary insomnia  -     traZODone (DESYREL) 300 MG tablet; Take 1 tablet (300 mg total) by mouth daily at bedtime    Need for vaccination  -     influenza vaccine, quadrivalent, 0 5 mL, preservative-free, for adult and pediatric patients 6 mos+ (AFLURIA, FLUARIX, FLULAVAL, FLUZONE)    ALFONSO (obstructive sleep apnea)        Benign essential hypertension  Pts symptoms are stable with current regime  No changes at present  Subjective:      Patient ID: Yolanda Batista is a 55 y o  female  Pt presents for routine visit  She is doing well overall  She is due for labs and flu vaccine   She has been trying to lose weight and is down over 20lbs  She has a sleep study which showed ALFONSO  Titration study recommended but pt would like to hold off to see if this is remedied by her weight loss  The following portions of the patient's history were reviewed and updated as appropriate:   She has a past medical history of Depression, Generalized anxiety disorder, H/O: hysterectomy, Hypertension, Obesity, Ovarian cyst, PVC (premature ventricular contraction), and Urinary tract infection  ,  does not have any pertinent problems on file  ,   has a past surgical history that includes Hysterectomy and Tumor removal ,  family history includes Diabetes in her mother; Mental illness in her paternal aunt and paternal uncle; Substance Abuse in her paternal aunt and paternal uncle ,   reports that she has been smoking cigarettes  She has been smoking about 1 00 pack per day  She has never used smokeless tobacco  She reports current alcohol use  She reports that she does not use drugs  ,  is allergic to nicotine polacrilex [nicotine] and fluoxetine     Current Outpatient Medications   Medication Sig Dispense Refill    albuterol (VENTOLIN HFA) 90 mcg/act inhaler Inhale 2 puffs every 4 (four) hours as needed for wheezing 1 Inhaler 5    ALPRAZolam (XANAX) 1 mg tablet Take 1 tablet (1 mg total) by mouth daily at bedtime as needed for anxiety 30 tablet 0    ARIPiprazole (ABILIFY) 10 mg tablet TAKE 1 TABLET BY MOUTH EVERY DAY 30 tablet 2    Breo Ellipta 100-25 MCG/INH inhaler INHALE 1 PUFF DAILY RINSE MOUTH AFTER USE  1 Inhaler 5    DULoxetine (CYMBALTA) 60 mg delayed release capsule Take 1 capsule (60 mg total) by mouth 2 (two) times a day 60 capsule 5    lisinopril-hydrochlorothiazide (PRINZIDE,ZESTORETIC) 10-12 5 MG per tablet Take 1 tablet by mouth daily 30 tablet 5    metoprolol succinate (TOPROL-XL) 50 mg 24 hr tablet Take 1 tablet (50 mg total) by mouth daily 30 tablet 5    phentermine 37 5 MG capsule Take 1 capsule (37 5 mg total) by mouth every morning 30 capsule 2    traZODone (DESYREL) 300 MG tablet Take 1 tablet (300 mg total) by mouth daily at bedtime 30 tablet 5    NON FORMULARY MEDICAL MARIJUANA       No current facility-administered medications for this visit  Review of Systems   Constitutional: Negative for chills and fever  HENT: Negative for congestion, ear pain, hearing loss, postnasal drip, rhinorrhea, sinus pressure, sinus pain, sore throat and trouble swallowing  Eyes: Negative for pain and visual disturbance  Respiratory: Negative for cough, chest tightness, shortness of breath and wheezing  Cardiovascular: Negative  Negative for chest pain, palpitations and leg swelling  Gastrointestinal: Negative for abdominal pain, blood in stool, constipation, diarrhea, nausea and vomiting  Endocrine: Negative for cold intolerance, heat intolerance, polydipsia, polyphagia and polyuria  Genitourinary: Negative for difficulty urinating, dysuria, flank pain and urgency  Musculoskeletal: Negative for arthralgias, back pain, gait problem and myalgias  Skin: Negative for rash  Allergic/Immunologic: Negative  Neurological: Negative for dizziness, weakness, light-headedness and headaches  Hematological: Negative  Psychiatric/Behavioral: Negative for behavioral problems, dysphoric mood and sleep disturbance  The patient is not nervous/anxious  PHQ-9 Depression Screening    PHQ-9:   Frequency of the following problems over the past two weeks:              Objective:  Vitals:    09/18/21 0902   BP: 120/80   Pulse: 84   Temp: (!) 97 3 °F (36 3 °C)   SpO2: 95%   Weight: 95 9 kg (211 lb 8 oz)   Height: 5' 2" (1 575 m)     Body mass index is 38 68 kg/m²  Physical Exam  Constitutional:       General: She is not in acute distress  Appearance: She is well-developed  She is not diaphoretic  HENT:      Head: Normocephalic and atraumatic        Right Ear: External ear normal       Left Ear: External ear normal       Nose: Nose normal       Mouth/Throat:      Pharynx: No oropharyngeal exudate  Eyes:      General: No scleral icterus  Right eye: No discharge  Left eye: No discharge  Conjunctiva/sclera: Conjunctivae normal       Pupils: Pupils are equal, round, and reactive to light  Neck:      Thyroid: No thyromegaly  Cardiovascular:      Rate and Rhythm: Normal rate and regular rhythm  Heart sounds: Normal heart sounds  No murmur heard  No friction rub  No gallop  Pulmonary:      Effort: Pulmonary effort is normal  No respiratory distress  Breath sounds: Normal breath sounds  No wheezing or rales  Abdominal:      General: Bowel sounds are normal  There is no distension  Palpations: Abdomen is soft  Tenderness: There is no abdominal tenderness  Musculoskeletal:         General: No tenderness or deformity  Normal range of motion  Cervical back: Normal range of motion and neck supple  Skin:     General: Skin is warm and dry  Neurological:      Mental Status: She is alert and oriented to person, place, and time  Cranial Nerves: No cranial nerve deficit  Psychiatric:         Behavior: Behavior normal          Thought Content: Thought content normal          Judgment: Judgment normal          Tobacco Cessation Counseling: Tobacco cessation counseling was not provided   The patient is sincerely urged to quit consumption of tobacco  She is ready to quit tobacco

## 2021-11-24 DIAGNOSIS — F41.1 GENERALIZED ANXIETY DISORDER: ICD-10-CM

## 2021-11-24 RX ORDER — ALPRAZOLAM 1 MG/1
1 TABLET ORAL
Qty: 30 TABLET | Refills: 0 | Status: SHIPPED | OUTPATIENT
Start: 2021-11-24 | End: 2022-01-26 | Stop reason: SDUPTHER

## 2021-12-10 DIAGNOSIS — E66.01 CLASS 3 SEVERE OBESITY DUE TO EXCESS CALORIES WITHOUT SERIOUS COMORBIDITY WITH BODY MASS INDEX (BMI) OF 40.0 TO 44.9 IN ADULT (HCC): ICD-10-CM

## 2021-12-10 RX ORDER — PHENTERMINE HYDROCHLORIDE 37.5 MG/1
37.5 CAPSULE ORAL EVERY MORNING
Qty: 30 CAPSULE | Refills: 2 | Status: SHIPPED | OUTPATIENT
Start: 2021-12-10

## 2022-01-22 DIAGNOSIS — I49.3 PVC (PREMATURE VENTRICULAR CONTRACTION): ICD-10-CM

## 2022-01-24 RX ORDER — METOPROLOL SUCCINATE 50 MG/1
TABLET, EXTENDED RELEASE ORAL
Qty: 30 TABLET | Refills: 5 | Status: SHIPPED | OUTPATIENT
Start: 2022-01-24

## 2022-01-26 DIAGNOSIS — F41.1 GENERALIZED ANXIETY DISORDER: ICD-10-CM

## 2022-01-26 RX ORDER — ALPRAZOLAM 1 MG/1
1 TABLET ORAL
Qty: 30 TABLET | Refills: 0 | Status: SHIPPED | OUTPATIENT
Start: 2022-01-26 | End: 2022-03-14 | Stop reason: SDUPTHER

## 2022-03-14 DIAGNOSIS — F41.1 GENERALIZED ANXIETY DISORDER: ICD-10-CM

## 2022-03-14 RX ORDER — ALPRAZOLAM 1 MG/1
1 TABLET ORAL
Qty: 30 TABLET | Refills: 0 | Status: SHIPPED | OUTPATIENT
Start: 2022-03-14 | End: 2022-06-10 | Stop reason: SDUPTHER

## 2022-03-21 DIAGNOSIS — F51.01 PRIMARY INSOMNIA: ICD-10-CM

## 2022-03-21 DIAGNOSIS — I10 BENIGN ESSENTIAL HYPERTENSION: ICD-10-CM

## 2022-03-21 DIAGNOSIS — F41.1 GENERALIZED ANXIETY DISORDER: ICD-10-CM

## 2022-03-21 RX ORDER — DULOXETIN HYDROCHLORIDE 60 MG/1
60 CAPSULE, DELAYED RELEASE ORAL 2 TIMES DAILY
Qty: 60 CAPSULE | Refills: 5 | Status: SHIPPED | OUTPATIENT
Start: 2022-03-21

## 2022-03-21 RX ORDER — TRAZODONE HYDROCHLORIDE 300 MG/1
TABLET ORAL
Qty: 30 TABLET | Refills: 5 | Status: SHIPPED | OUTPATIENT
Start: 2022-03-21

## 2022-03-21 RX ORDER — LISINOPRIL AND HYDROCHLOROTHIAZIDE 12.5; 1 MG/1; MG/1
TABLET ORAL
Qty: 30 TABLET | Refills: 5 | Status: SHIPPED | OUTPATIENT
Start: 2022-03-21

## 2022-06-10 DIAGNOSIS — F41.1 GENERALIZED ANXIETY DISORDER: ICD-10-CM

## 2022-06-13 ENCOUNTER — OFFICE VISIT (OUTPATIENT)
Dept: URGENT CARE | Facility: CLINIC | Age: 47
End: 2022-06-13
Payer: COMMERCIAL

## 2022-06-13 VITALS
TEMPERATURE: 97.9 F | RESPIRATION RATE: 20 BRPM | BODY MASS INDEX: 38.59 KG/M2 | SYSTOLIC BLOOD PRESSURE: 118 MMHG | HEART RATE: 89 BPM | OXYGEN SATURATION: 95 % | WEIGHT: 211 LBS | DIASTOLIC BLOOD PRESSURE: 78 MMHG

## 2022-06-13 DIAGNOSIS — R10.84 GENERALIZED ABDOMINAL PAIN: Primary | ICD-10-CM

## 2022-06-13 PROCEDURE — 99213 OFFICE O/P EST LOW 20 MIN: CPT

## 2022-06-13 RX ORDER — ALPRAZOLAM 1 MG/1
1 TABLET ORAL
Qty: 30 TABLET | Refills: 0 | Status: SHIPPED | OUTPATIENT
Start: 2022-06-13

## 2022-06-14 NOTE — PROGRESS NOTES
[facility]        NAME: Lauren Galeano is a 52 y o  female  : 1975    MRN: [id number]  DATE: 2022  TIME: 8:08 AM    Assessment and Plan   Generalized abdominal pain [R10 84]  1  Generalized abdominal pain  Transfer to other facility     I sent the patient to the ER for further evaluation  She has a history of diverticulitis and admits she had low grade fever and chills  Patient Instructions       Follow up with PCP in 3-5 days  Proceed to  ER if symptoms worsen  Chief Complaint     Chief Complaint   Patient presents with    Abdominal Pain         History of Present Illness       Abdominal Pain  This is a new problem  The current episode started in the past 7 days  The onset quality is gradual  The problem occurs intermittently  The problem has been unchanged  The pain is located in the epigastric region and generalized abdominal region  The pain is at a severity of 4/10  The pain is mild  The quality of the pain is aching and burning  The abdominal pain does not radiate  Associated symptoms include arthralgias, a fever and nausea  Pertinent negatives include no constipation, diarrhea, dysuria, headaches or vomiting  The pain is relieved by nothing  She has tried antacids for the symptoms  The treatment provided no relief  There is no history of abdominal surgery, GERD, irritable bowel syndrome, pancreatitis or PUD  diverticulitis       Review of Systems   Review of Systems   Constitutional: Positive for activity change, appetite change, chills, fatigue and fever  HENT: Negative for congestion  Respiratory: Negative for chest tightness, shortness of breath and wheezing  Cardiovascular: Negative for chest pain and palpitations  Gastrointestinal: Positive for abdominal pain and nausea  Negative for blood in stool, constipation, diarrhea and vomiting  Genitourinary: Negative for dysuria  Musculoskeletal: Positive for arthralgias     Neurological: Negative for dizziness, weakness, numbness and headaches  All other systems reviewed and are negative          Current Medications       Current Outpatient Medications:     albuterol (VENTOLIN HFA) 90 mcg/act inhaler, Inhale 2 puffs every 4 (four) hours as needed for wheezing, Disp: 1 Inhaler, Rfl: 5    ALPRAZolam (XANAX) 1 mg tablet, Take 1 tablet (1 mg total) by mouth daily at bedtime as needed for anxiety, Disp: 30 tablet, Rfl: 0    Breo Ellipta 100-25 MCG/INH inhaler, INHALE 1 PUFF DAILY RINSE MOUTH AFTER USE , Disp: 1 Inhaler, Rfl: 5    DULoxetine (CYMBALTA) 60 mg delayed release capsule, TAKE 1 CAPSULE (60 MG TOTAL) BY MOUTH 2 (TWO) TIMES A DAY, Disp: 60 capsule, Rfl: 5    lisinopril-hydrochlorothiazide (PRINZIDE,ZESTORETIC) 10-12 5 MG per tablet, TAKE 1 TABLET BY MOUTH EVERY DAY, Disp: 30 tablet, Rfl: 5    metoprolol succinate (TOPROL-XL) 50 mg 24 hr tablet, TAKE 1 TABLET BY MOUTH EVERY DAY, Disp: 30 tablet, Rfl: 5    NON FORMULARY, MEDICAL MARIJUANA, Disp: , Rfl:     traZODone (DESYREL) 300 MG tablet, TAKE 1 TABLET BY MOUTH DAILY AT BEDTIME, Disp: 30 tablet, Rfl: 5    ARIPiprazole (ABILIFY) 10 mg tablet, TAKE 1 TABLET BY MOUTH EVERY DAY (Patient not taking: Reported on 6/13/2022), Disp: 30 tablet, Rfl: 2    phentermine 37 5 MG capsule, Take 1 capsule (37 5 mg total) by mouth every morning (Patient not taking: Reported on 6/13/2022), Disp: 30 capsule, Rfl: 2    Current Allergies     Allergies as of 06/13/2022 - Reviewed 09/20/2021   Allergen Reaction Noted    Nicotine polacrilex [nicotine]  09/18/2019    Fluoxetine Anxiety             The following portions of the patient's history were reviewed and updated as appropriate: allergies, current medications, past family history, past medical history, past social history, past surgical history and problem list      Past Medical History:   Diagnosis Date    Anxiety     Depression     Generalized anxiety disorder     H/O: hysterectomy     LAST ASSESSED 30 DEC 2015    Hypertension     Obesity     Ovarian cyst     PVC (premature ventricular contraction)     Urinary tract infection        Past Surgical History:   Procedure Laterality Date    HYSTERECTOMY      TUMOR REMOVAL         Family History   Problem Relation Age of Onset    Diabetes Mother     Substance Abuse Paternal Aunt     Mental illness Paternal Aunt     Substance Abuse Paternal Uncle     Mental illness Paternal Uncle          Medications have been verified  Objective   /78   Pulse 89   Temp 97 9 °F (36 6 °C)   Resp 20   Wt 95 7 kg (211 lb)   SpO2 95%   BMI 38 59 kg/m²        Physical Exam     Physical Exam  Vitals and nursing note reviewed  Constitutional:       General: She is not in acute distress  Appearance: She is well-developed and normal weight  She is not ill-appearing or toxic-appearing  Cardiovascular:      Rate and Rhythm: Normal rate and regular rhythm  Heart sounds: Normal heart sounds  Pulmonary:      Effort: Pulmonary effort is normal       Breath sounds: Normal breath sounds  Abdominal:      General: Abdomen is flat  Bowel sounds are normal  There is no distension  Palpations: Abdomen is soft  Tenderness: There is no abdominal tenderness  There is no right CVA tenderness, left CVA tenderness, guarding or rebound  Negative signs include Gaines's sign  Skin:     General: Skin is warm and dry  Capillary Refill: Capillary refill takes less than 2 seconds  Neurological:      General: No focal deficit present  Mental Status: She is alert and oriented to person, place, and time

## 2022-08-31 DIAGNOSIS — F41.1 GENERALIZED ANXIETY DISORDER: ICD-10-CM

## 2022-09-01 RX ORDER — ALPRAZOLAM 1 MG/1
1 TABLET ORAL
Qty: 30 TABLET | Refills: 0 | Status: SHIPPED | OUTPATIENT
Start: 2022-09-01 | End: 2022-10-17 | Stop reason: SDUPTHER

## 2022-09-14 DIAGNOSIS — I49.3 PVC (PREMATURE VENTRICULAR CONTRACTION): ICD-10-CM

## 2022-09-14 DIAGNOSIS — F41.1 GENERALIZED ANXIETY DISORDER: ICD-10-CM

## 2022-09-14 RX ORDER — DULOXETIN HYDROCHLORIDE 60 MG/1
CAPSULE, DELAYED RELEASE ORAL
Qty: 180 CAPSULE | Refills: 0 | Status: SHIPPED | OUTPATIENT
Start: 2022-09-14

## 2022-09-14 RX ORDER — METOPROLOL SUCCINATE 50 MG/1
TABLET, EXTENDED RELEASE ORAL
Qty: 90 TABLET | Refills: 0 | Status: SHIPPED | OUTPATIENT
Start: 2022-09-14 | End: 2022-10-17

## 2022-10-17 ENCOUNTER — OFFICE VISIT (OUTPATIENT)
Dept: INTERNAL MEDICINE CLINIC | Facility: CLINIC | Age: 47
End: 2022-10-17
Payer: COMMERCIAL

## 2022-10-17 VITALS
HEART RATE: 71 BPM | TEMPERATURE: 97.6 F | WEIGHT: 201.25 LBS | SYSTOLIC BLOOD PRESSURE: 122 MMHG | OXYGEN SATURATION: 97 % | DIASTOLIC BLOOD PRESSURE: 62 MMHG | HEIGHT: 63 IN | BODY MASS INDEX: 35.66 KG/M2

## 2022-10-17 DIAGNOSIS — Z13.220 SCREENING, LIPID: ICD-10-CM

## 2022-10-17 DIAGNOSIS — Z12.11 SCREENING FOR COLON CANCER: ICD-10-CM

## 2022-10-17 DIAGNOSIS — E66.01 CLASS 3 SEVERE OBESITY DUE TO EXCESS CALORIES WITHOUT SERIOUS COMORBIDITY WITH BODY MASS INDEX (BMI) OF 40.0 TO 44.9 IN ADULT (HCC): ICD-10-CM

## 2022-10-17 DIAGNOSIS — E55.9 VITAMIN D DEFICIENCY: ICD-10-CM

## 2022-10-17 DIAGNOSIS — Z13.1 SCREENING FOR DIABETES MELLITUS: ICD-10-CM

## 2022-10-17 DIAGNOSIS — J44.9 CHRONIC OBSTRUCTIVE PULMONARY DISEASE, UNSPECIFIED COPD TYPE (HCC): ICD-10-CM

## 2022-10-17 DIAGNOSIS — Z12.31 SCREENING MAMMOGRAM FOR BREAST CANCER: ICD-10-CM

## 2022-10-17 DIAGNOSIS — Z13.0 SCREENING FOR DEFICIENCY ANEMIA: ICD-10-CM

## 2022-10-17 DIAGNOSIS — F41.1 GENERALIZED ANXIETY DISORDER: ICD-10-CM

## 2022-10-17 DIAGNOSIS — I10 BENIGN ESSENTIAL HYPERTENSION: Primary | ICD-10-CM

## 2022-10-17 DIAGNOSIS — I49.3 PVC (PREMATURE VENTRICULAR CONTRACTION): ICD-10-CM

## 2022-10-17 DIAGNOSIS — Z13.29 SCREENING FOR THYROID DISORDER: ICD-10-CM

## 2022-10-17 PROBLEM — L98.9 SKIN LESION: Status: RESOLVED | Noted: 2020-11-12 | Resolved: 2022-10-17

## 2022-10-17 PROCEDURE — 99214 OFFICE O/P EST MOD 30 MIN: CPT | Performed by: PHYSICIAN ASSISTANT

## 2022-10-17 RX ORDER — PHENTERMINE HYDROCHLORIDE 37.5 MG/1
37.5 CAPSULE ORAL EVERY MORNING
Qty: 30 CAPSULE | Refills: 2 | Status: SHIPPED | OUTPATIENT
Start: 2022-10-17

## 2022-10-17 RX ORDER — ALPRAZOLAM 1 MG/1
1 TABLET ORAL
Qty: 30 TABLET | Refills: 0 | Status: SHIPPED | OUTPATIENT
Start: 2022-10-17 | End: 2022-10-27 | Stop reason: SDUPTHER

## 2022-10-17 RX ORDER — METOPROLOL SUCCINATE 50 MG/1
50 TABLET, EXTENDED RELEASE ORAL DAILY
Qty: 90 TABLET | Refills: 0 | Status: SHIPPED | OUTPATIENT
Start: 2022-10-17

## 2022-10-17 RX ORDER — LISINOPRIL AND HYDROCHLOROTHIAZIDE 12.5; 1 MG/1; MG/1
1 TABLET ORAL DAILY
Qty: 30 TABLET | Refills: 5 | Status: SHIPPED | OUTPATIENT
Start: 2022-10-17

## 2022-10-17 NOTE — ASSESSMENT & PLAN NOTE
Will start phentermine  Pt counseled on short term use of this medication of up to 6 months  Directions for use and possible side effects discussed and patient verbalized understanding of these

## 2022-10-17 NOTE — ASSESSMENT & PLAN NOTE
Pts symptoms are stable with current regime  No changes at present  Restart metoprolol for palpitations

## 2022-10-17 NOTE — PROGRESS NOTES
Name: Sheryle Squire      : 1975      MRN: [id number]  Encounter Provider: Abhinav Bruner PA-C  Encounter Date: 10/17/2022   Encounter department: [address] [place] [address]  Benign essential hypertension  Assessment & Plan:  Pts symptoms are stable with current regime  No changes at present  Restart metoprolol for palpitations  Orders:  -     lisinopril-hydrochlorothiazide (PRINZIDE,ZESTORETIC) 10-12 5 MG per tablet; Take 1 tablet by mouth daily    2  Screening mammogram for breast cancer  -     Mammo screening bilateral w 3d & cad; Future; Expected date: 10/17/2022    3  Screening for colon cancer  -     Cologuard    4  PVC (premature ventricular contraction)  -     metoprolol succinate (TOPROL-XL) 50 mg 24 hr tablet; Take 1 tablet (50 mg total) by mouth daily    5  Chronic obstructive pulmonary disease, unspecified COPD type ([facility] [address] )    6  Class 3 severe obesity due to excess calories without serious comorbidity with body mass index (BMI) of 40 0 to 44 9 in adult [facility])  Assessment & Plan: Will start phentermine  Pt counseled on short term use of this medication of up to 6 months  Directions for use and possible side effects discussed and patient verbalized understanding of these  Orders:  -     phentermine 37 5 MG capsule; Take 1 capsule (37 5 mg total) by mouth every morning    7  Vitamin D deficiency  -     Vitamin D 25 hydroxy; Future    8  Screening for deficiency anemia  -     CBC and differential; Future    9  Screening for diabetes mellitus  -     Comprehensive metabolic panel; Future    10  Screening for thyroid disorder  -     TSH, 3rd generation; Future    11  Screening, lipid  -     Lipid panel; Future    12  Generalized anxiety disorder  -     ALPRAZolam (XANAX) 1 mg tablet; Take 1 tablet (1 mg total) by mouth daily at bedtime as needed for anxiety      BMI Counseling: Body mass index is 35 65 kg/m²   The BMI is above normal  Nutrition recommendations include decreasing portion sizes, consuming healthier snacks and limiting drinks that contain sugar  Rationale for BMI follow-up plan is due to patient being overweight or obese  Tobacco Cessation Counseling: Tobacco cessation counseling was not provided  The patient is sincerely urged to quit consumption of tobacco  She is not ready to quit tobacco          Subjective      Pt presents for routine visit  She is doing well overall  She has lost over 30lbs but feels she is stuck at a plateau  She would be interested in revisiting phentermine as this gave her success previously  She is due for mammogram, labs and CRC screening  BP is controlled  She stopped metoprolol because she thought is may be contributing to her insomnia  He sleep did not improve and she did have an episode of palpitations so she is agreeable to restarting this  Review of Systems   Constitutional: Negative for chills and fever  HENT: Negative for congestion, ear pain, hearing loss, postnasal drip, rhinorrhea, sinus pressure, sinus pain, sore throat and trouble swallowing  Eyes: Negative for pain and visual disturbance  Respiratory: Negative for cough, chest tightness, shortness of breath and wheezing  Cardiovascular: Positive for palpitations  Negative for chest pain and leg swelling  Gastrointestinal: Negative for abdominal pain, blood in stool, constipation, diarrhea, nausea and vomiting  Endocrine: Negative for cold intolerance, heat intolerance, polydipsia, polyphagia and polyuria  Genitourinary: Negative for difficulty urinating, dysuria, flank pain and urgency  Musculoskeletal: Negative for arthralgias, back pain, gait problem and myalgias  Skin: Negative for rash  Allergic/Immunologic: Negative  Neurological: Negative for dizziness, weakness, light-headedness and headaches  Hematological: Negative  Psychiatric/Behavioral: Positive for sleep disturbance   Negative for behavioral problems and dysphoric mood  The patient is not nervous/anxious  Current Outpatient Medications on File Prior to Visit   Medication Sig   • albuterol (VENTOLIN HFA) 90 mcg/act inhaler Inhale 2 puffs every 4 (four) hours as needed for wheezing   • Breo Ellipta 100-25 MCG/INH inhaler INHALE 1 PUFF DAILY RINSE MOUTH AFTER USE  • DULoxetine (CYMBALTA) 60 mg delayed release capsule TAKE 1 CAPSULE BY MOUTH 2 TIMES A DAY  (Patient taking differently: Take 60 mg by mouth daily)   • NON FORMULARY MEDICAL MARIJUANA   • [DISCONTINUED] ALPRAZolam (XANAX) 1 mg tablet Take 1 tablet (1 mg total) by mouth daily at bedtime as needed for anxiety   • [DISCONTINUED] lisinopril-hydrochlorothiazide (PRINZIDE,ZESTORETIC) 10-12 5 MG per tablet TAKE 1 TABLET BY MOUTH EVERY DAY   • [DISCONTINUED] ARIPiprazole (ABILIFY) 10 mg tablet TAKE 1 TABLET BY MOUTH EVERY DAY (Patient not taking: No sig reported)   • [DISCONTINUED] metoprolol succinate (TOPROL-XL) 50 mg 24 hr tablet TAKE 1 TABLET BY MOUTH EVERY DAY (Patient not taking: Reported on 10/17/2022)   • [DISCONTINUED] phentermine 37 5 MG capsule Take 1 capsule (37 5 mg total) by mouth every morning (Patient not taking: No sig reported)   • [DISCONTINUED] traZODone (DESYREL) 300 MG tablet TAKE 1 TABLET BY MOUTH DAILY AT BEDTIME (Patient not taking: Reported on 10/17/2022)       Objective     /62 (BP Location: Left arm, Patient Position: Sitting)   Pulse 71   Temp 97 6 °F (36 4 °C)   Ht 5' 3" (1 6 m)   Wt 91 3 kg (201 lb 4 oz)   SpO2 97%   BMI 35 65 kg/m²     Physical Exam  Constitutional:       General: She is not in acute distress  Appearance: She is well-developed  She is obese  She is not diaphoretic  HENT:      Head: Normocephalic and atraumatic  Right Ear: External ear normal       Left Ear: External ear normal       Nose: Nose normal       Mouth/Throat:      Pharynx: No oropharyngeal exudate  Eyes:      General: No scleral icterus  Right eye: No discharge  Left eye: No discharge  Conjunctiva/sclera: Conjunctivae normal       Pupils: Pupils are equal, round, and reactive to light  Neck:      Thyroid: No thyromegaly  Cardiovascular:      Rate and Rhythm: Normal rate and regular rhythm  Heart sounds: Normal heart sounds  No murmur heard  No friction rub  No gallop  Pulmonary:      Effort: Pulmonary effort is normal  No respiratory distress  Breath sounds: Normal breath sounds  No wheezing or rales  Abdominal:      General: Bowel sounds are normal  There is no distension  Palpations: Abdomen is soft  Tenderness: There is no abdominal tenderness  Musculoskeletal:         General: No tenderness or deformity  Normal range of motion  Cervical back: Normal range of motion and neck supple  Skin:     General: Skin is warm and dry  Neurological:      Mental Status: She is alert and oriented to person, place, and time  Cranial Nerves: No cranial nerve deficit  Psychiatric:         Behavior: Behavior normal          Thought Content:  Thought content normal          Judgment: Judgment normal        Eden Coker PA-C

## 2022-10-27 DIAGNOSIS — F41.1 GENERALIZED ANXIETY DISORDER: ICD-10-CM

## 2022-10-27 RX ORDER — ALPRAZOLAM 1 MG/1
1 TABLET ORAL
Qty: 30 TABLET | Refills: 0 | Status: SHIPPED | OUTPATIENT
Start: 2022-10-27

## 2022-11-09 DIAGNOSIS — I10 BENIGN ESSENTIAL HYPERTENSION: ICD-10-CM

## 2022-11-09 RX ORDER — LISINOPRIL AND HYDROCHLOROTHIAZIDE 12.5; 1 MG/1; MG/1
TABLET ORAL
Qty: 90 TABLET | Refills: 2 | Status: SHIPPED | OUTPATIENT
Start: 2022-11-09

## 2022-12-21 DIAGNOSIS — F41.1 GENERALIZED ANXIETY DISORDER: ICD-10-CM

## 2022-12-21 RX ORDER — DULOXETIN HYDROCHLORIDE 60 MG/1
CAPSULE, DELAYED RELEASE ORAL
Qty: 180 CAPSULE | Refills: 0 | Status: SHIPPED | OUTPATIENT
Start: 2022-12-21

## 2022-12-29 DIAGNOSIS — F41.1 GENERALIZED ANXIETY DISORDER: ICD-10-CM

## 2022-12-29 RX ORDER — ALPRAZOLAM 1 MG/1
1 TABLET ORAL
Qty: 30 TABLET | Refills: 0 | Status: SHIPPED | OUTPATIENT
Start: 2022-12-29

## 2023-01-06 LAB — COLOGUARD RESULT REPORTABLE: NEGATIVE

## 2023-01-20 DIAGNOSIS — F41.1 GENERALIZED ANXIETY DISORDER: ICD-10-CM

## 2023-01-20 RX ORDER — ALPRAZOLAM 1 MG/1
1 TABLET ORAL
Qty: 30 TABLET | Refills: 0 | Status: SHIPPED | OUTPATIENT
Start: 2023-01-20

## 2023-01-21 DIAGNOSIS — E66.01 CLASS 3 SEVERE OBESITY DUE TO EXCESS CALORIES WITHOUT SERIOUS COMORBIDITY WITH BODY MASS INDEX (BMI) OF 40.0 TO 44.9 IN ADULT (HCC): ICD-10-CM

## 2023-01-24 RX ORDER — PHENTERMINE HYDROCHLORIDE 37.5 MG/1
CAPSULE ORAL
Qty: 30 CAPSULE | Refills: 2 | Status: SHIPPED | OUTPATIENT
Start: 2023-01-24

## 2023-02-27 DIAGNOSIS — F41.1 GENERALIZED ANXIETY DISORDER: ICD-10-CM

## 2023-02-27 RX ORDER — ALPRAZOLAM 1 MG/1
1 TABLET ORAL
Qty: 30 TABLET | Refills: 0 | Status: SHIPPED | OUTPATIENT
Start: 2023-02-27

## 2023-03-10 ENCOUNTER — RA CDI HCC (OUTPATIENT)
Dept: OTHER | Facility: HOSPITAL | Age: 48
End: 2023-03-10

## 2023-03-10 NOTE — PROGRESS NOTES
[facility]  coding opportunities       Chart reviewed, no opportunity found: CHART REVIEWED, NO OPPORTUNITY FOUND        Patients Insurance        Commercial Insurance: [employer]

## 2023-03-17 ENCOUNTER — OFFICE VISIT (OUTPATIENT)
Dept: INTERNAL MEDICINE CLINIC | Facility: CLINIC | Age: 48
End: 2023-03-17

## 2023-03-17 VITALS
HEART RATE: 80 BPM | HEIGHT: 63 IN | DIASTOLIC BLOOD PRESSURE: 62 MMHG | BODY MASS INDEX: 32.64 KG/M2 | TEMPERATURE: 97.6 F | OXYGEN SATURATION: 98 % | SYSTOLIC BLOOD PRESSURE: 136 MMHG | WEIGHT: 184.25 LBS

## 2023-03-17 DIAGNOSIS — J44.9 CHRONIC OBSTRUCTIVE PULMONARY DISEASE, UNSPECIFIED COPD TYPE (HCC): ICD-10-CM

## 2023-03-17 DIAGNOSIS — I73.00 RAYNAUD'S DISEASE WITHOUT GANGRENE: Primary | ICD-10-CM

## 2023-03-17 DIAGNOSIS — F17.200 SMOKING: ICD-10-CM

## 2023-03-17 RX ORDER — ALBUTEROL SULFATE 90 UG/1
2 AEROSOL, METERED RESPIRATORY (INHALATION) EVERY 4 HOURS PRN
Qty: 18 G | Refills: 2 | Status: SHIPPED | OUTPATIENT
Start: 2023-03-17

## 2023-03-17 RX ORDER — AMLODIPINE BESYLATE 10 MG/1
5 TABLET ORAL DAILY
Qty: 30 TABLET | Refills: 2 | Status: SHIPPED | OUTPATIENT
Start: 2023-03-17

## 2023-03-18 ENCOUNTER — APPOINTMENT (OUTPATIENT)
Dept: LAB | Facility: CLINIC | Age: 48
End: 2023-03-18

## 2023-03-18 DIAGNOSIS — E55.9 VITAMIN D DEFICIENCY: ICD-10-CM

## 2023-03-18 DIAGNOSIS — I73.00 RAYNAUD'S DISEASE WITHOUT GANGRENE: ICD-10-CM

## 2023-03-18 DIAGNOSIS — F41.1 GENERALIZED ANXIETY DISORDER: ICD-10-CM

## 2023-03-18 DIAGNOSIS — Z13.29 SCREENING FOR THYROID DISORDER: ICD-10-CM

## 2023-03-18 DIAGNOSIS — Z13.0 SCREENING FOR DEFICIENCY ANEMIA: ICD-10-CM

## 2023-03-18 DIAGNOSIS — Z13.1 SCREENING FOR DIABETES MELLITUS: ICD-10-CM

## 2023-03-18 DIAGNOSIS — Z13.220 SCREENING, LIPID: ICD-10-CM

## 2023-03-18 LAB
25(OH)D3 SERPL-MCNC: 7.9 NG/ML (ref 30–100)
ALBUMIN SERPL BCP-MCNC: 4 G/DL (ref 3.5–5)
ALP SERPL-CCNC: 79 U/L (ref 46–116)
ALT SERPL W P-5'-P-CCNC: 27 U/L (ref 12–78)
ANION GAP SERPL CALCULATED.3IONS-SCNC: 4 MMOL/L (ref 4–13)
AST SERPL W P-5'-P-CCNC: 14 U/L (ref 5–45)
BASOPHILS # BLD AUTO: 0.05 THOUSANDS/ÂΜL (ref 0–0.1)
BASOPHILS NFR BLD AUTO: 0 % (ref 0–1)
BILIRUB SERPL-MCNC: 0.44 MG/DL (ref 0.2–1)
BUN SERPL-MCNC: 12 MG/DL (ref 5–25)
CALCIUM SERPL-MCNC: 9.5 MG/DL (ref 8.3–10.1)
CHLORIDE SERPL-SCNC: 105 MMOL/L (ref 96–108)
CHOLEST SERPL-MCNC: 239 MG/DL
CO2 SERPL-SCNC: 26 MMOL/L (ref 21–32)
CREAT SERPL-MCNC: 0.7 MG/DL (ref 0.6–1.3)
EOSINOPHIL # BLD AUTO: 0.18 THOUSAND/ÂΜL (ref 0–0.61)
EOSINOPHIL NFR BLD AUTO: 1 % (ref 0–6)
ERYTHROCYTE [DISTWIDTH] IN BLOOD BY AUTOMATED COUNT: 13.3 % (ref 11.6–15.1)
GFR SERPL CREATININE-BSD FRML MDRD: 103 ML/MIN/1.73SQ M
GLUCOSE P FAST SERPL-MCNC: 93 MG/DL (ref 65–99)
HCT VFR BLD AUTO: 49.1 % (ref 34.8–46.1)
HDLC SERPL-MCNC: 35 MG/DL
HGB BLD-MCNC: 15.7 G/DL (ref 11.5–15.4)
IMM GRANULOCYTES # BLD AUTO: 0.1 THOUSAND/UL (ref 0–0.2)
IMM GRANULOCYTES NFR BLD AUTO: 1 % (ref 0–2)
LDLC SERPL CALC-MCNC: 163 MG/DL (ref 0–100)
LYMPHOCYTES # BLD AUTO: 3.77 THOUSANDS/ÂΜL (ref 0.6–4.47)
LYMPHOCYTES NFR BLD AUTO: 22 % (ref 14–44)
MCH RBC QN AUTO: 31.2 PG (ref 26.8–34.3)
MCHC RBC AUTO-ENTMCNC: 32 G/DL (ref 31.4–37.4)
MCV RBC AUTO: 98 FL (ref 82–98)
MONOCYTES # BLD AUTO: 0.77 THOUSAND/ÂΜL (ref 0.17–1.22)
MONOCYTES NFR BLD AUTO: 5 % (ref 4–12)
NEUTROPHILS # BLD AUTO: 12.36 THOUSANDS/ÂΜL (ref 1.85–7.62)
NEUTS SEG NFR BLD AUTO: 71 % (ref 43–75)
NONHDLC SERPL-MCNC: 204 MG/DL
NRBC BLD AUTO-RTO: 0 /100 WBCS
PLATELET # BLD AUTO: 425 THOUSANDS/UL (ref 149–390)
PMV BLD AUTO: 10.7 FL (ref 8.9–12.7)
POTASSIUM SERPL-SCNC: 3.6 MMOL/L (ref 3.5–5.3)
PROT SERPL-MCNC: 7.4 G/DL (ref 6.4–8.4)
RBC # BLD AUTO: 5.03 MILLION/UL (ref 3.81–5.12)
SODIUM SERPL-SCNC: 135 MMOL/L (ref 135–147)
TRIGL SERPL-MCNC: 203 MG/DL
TSH SERPL DL<=0.05 MIU/L-ACNC: 1.97 UIU/ML (ref 0.45–4.5)
WBC # BLD AUTO: 17.23 THOUSAND/UL (ref 4.31–10.16)

## 2023-03-18 RX ORDER — DULOXETIN HYDROCHLORIDE 60 MG/1
60 CAPSULE, DELAYED RELEASE ORAL 2 TIMES DAILY
Qty: 180 CAPSULE | Refills: 0 | Status: SHIPPED | OUTPATIENT
Start: 2023-03-18

## 2023-03-19 LAB
ANA SER QL IA: NEGATIVE
B BURGDOR IGG+IGM SER-ACNC: 0.2 AI
RHEUMATOID FACT SER QL LA: NEGATIVE

## 2023-03-19 NOTE — TELEPHONE ENCOUNTER
For Xavier Mcginnis to decide  Go home and rest for the next 48 hours.  Steroids and cough medicine as prescribed.  Use Tylenol as needed.  Follow-up your doctor if not better in several days or return if worse

## 2023-03-20 PROBLEM — I73.00 RAYNAUD'S DISEASE WITHOUT GANGRENE: Status: ACTIVE | Noted: 2023-03-20

## 2023-03-20 LAB
ENA SS-A AB SER-ACNC: <0.2 AI (ref 0–0.9)
ENA SS-B AB SER-ACNC: <0.2 AI (ref 0–0.9)

## 2023-03-20 NOTE — PROGRESS NOTES
Name: Rell Crowe      : 1975      MRN: [id number]  Encounter Provider: Mindy Trejo PA-C  Encounter Date: 3/17/2023   Encounter department: [address] [place] [address]  Raynaud's disease without gangrene  Assessment & Plan: Will check complete labs including those to evaluate for autoimmune/rheumatologic issue  Will discontinue lisinopril hct in favor of calcium channel blocker to help with symptoms  Directions for use and possible side effects discussed and patient verbalized understanding of these  Orders:  -     KELSEY Screen w/ Reflex to Titer/Pattern; Future  -     Lyme Total Antibody Profile with reflex to WB; Future  -     RF Screen w/ Reflex to Titer; Future  -     Sjogren's Antibodies; Future  -     amLODIPine (NORVASC) 10 mg tablet; Take 0 5 tablets (5 mg total) by mouth daily    2  Smoking  -     albuterol (Ventolin HFA) 90 mcg/act inhaler; Inhale 2 puffs every 4 (four) hours as needed for wheezing    3  Chronic obstructive pulmonary disease, unspecified COPD type ([facility] [address] )           Subjective      Pt presents for routine visit  She is doing well overall  She continues to lose weight and is down 50lb with current regime  She is up to date with CRC screening  She is due for mammogram and labs  She is noting issues with discoloration and discomfort to certain toes on her feet for several months consistent with raynauds  Review of Systems   Constitutional: Negative for chills and fever  HENT: Negative for congestion, ear pain, hearing loss, postnasal drip, rhinorrhea, sinus pressure, sinus pain, sore throat and trouble swallowing  Eyes: Negative for pain and visual disturbance  Respiratory: Negative for cough, chest tightness, shortness of breath and wheezing  Cardiovascular: Negative  Negative for chest pain, palpitations and leg swelling     Gastrointestinal: Negative for abdominal pain, blood in stool, constipation, diarrhea, nausea and vomiting  Endocrine: Positive for cold intolerance  Negative for heat intolerance, polydipsia, polyphagia and polyuria  Genitourinary: Negative for difficulty urinating, dysuria, flank pain and urgency  Musculoskeletal: Negative for arthralgias, back pain, gait problem and myalgias  Skin: Positive for color change  Negative for rash  Allergic/Immunologic: Negative  Neurological: Negative for dizziness, weakness, light-headedness and headaches  Hematological: Negative  Psychiatric/Behavioral: Negative for behavioral problems, dysphoric mood and sleep disturbance  The patient is not nervous/anxious  Current Outpatient Medications on File Prior to Visit   Medication Sig   • ALPRAZolam (XANAX) 1 mg tablet Take 1 tablet (1 mg total) by mouth daily at bedtime as needed for anxiety   • Breo Ellipta 100-25 MCG/INH inhaler INHALE 1 PUFF DAILY RINSE MOUTH AFTER USE  • metoprolol succinate (TOPROL-XL) 50 mg 24 hr tablet Take 1 tablet (50 mg total) by mouth daily   • NON FORMULARY MEDICAL MARIJUANA   • phentermine 37 5 MG capsule TAKE 1 CAPSULE BY MOUTH EVERY MORNING       Objective     /62 (BP Location: Left arm, Patient Position: Sitting)   Pulse 80   Temp 97 6 °F (36 4 °C)   Ht 5' 3" (1 6 m)   Wt 83 6 kg (184 lb 4 oz)   SpO2 98%   BMI 32 64 kg/m²     Physical Exam  Vitals and nursing note reviewed  Constitutional:       General: She is not in acute distress  Appearance: She is well-developed  She is not diaphoretic  HENT:      Head: Normocephalic and atraumatic  Right Ear: External ear normal       Left Ear: External ear normal       Nose: Nose normal       Mouth/Throat:      Pharynx: No oropharyngeal exudate  Eyes:      General: No scleral icterus  Right eye: No discharge  Left eye: No discharge  Conjunctiva/sclera: Conjunctivae normal       Pupils: Pupils are equal, round, and reactive to light  Neck:      Thyroid: No thyromegaly     Cardiovascular: Rate and Rhythm: Normal rate and regular rhythm  Heart sounds: Normal heart sounds  No murmur heard  No friction rub  No gallop  Pulmonary:      Effort: Pulmonary effort is normal  No respiratory distress  Breath sounds: Normal breath sounds  No wheezing or rales  Abdominal:      General: Bowel sounds are normal  There is no distension  Palpations: Abdomen is soft  Tenderness: There is no abdominal tenderness  Musculoskeletal:         General: No tenderness or deformity  Normal range of motion  Cervical back: Normal range of motion and neck supple  Skin:     General: Skin is warm and dry  Coloration: Skin is pale  Neurological:      Mental Status: She is alert and oriented to person, place, and time  Cranial Nerves: No cranial nerve deficit  Psychiatric:         Behavior: Behavior normal          Thought Content:  Thought content normal          Judgment: Judgment normal        Eden Coker PA-C

## 2023-03-20 NOTE — ASSESSMENT & PLAN NOTE
Will check complete labs including those to evaluate for autoimmune/rheumatologic issue  Will discontinue lisinopril hct in favor of calcium channel blocker to help with symptoms  Directions for use and possible side effects discussed and patient verbalized understanding of these

## 2023-03-22 ENCOUNTER — TELEPHONE (OUTPATIENT)
Dept: OBGYN CLINIC | Facility: OTHER | Age: 48
End: 2023-03-22

## 2023-03-22 DIAGNOSIS — E78.5 DYSLIPIDEMIA: ICD-10-CM

## 2023-03-22 DIAGNOSIS — D72.829 LEUKOCYTOSIS, UNSPECIFIED TYPE: Primary | ICD-10-CM

## 2023-03-22 DIAGNOSIS — E55.9 VITAMIN D DEFICIENCY: ICD-10-CM

## 2023-03-22 RX ORDER — ATORVASTATIN CALCIUM 20 MG/1
20 TABLET, FILM COATED ORAL DAILY
Qty: 30 TABLET | Refills: 5 | Status: SHIPPED | OUTPATIENT
Start: 2023-03-22 | End: 2023-04-15

## 2023-03-22 RX ORDER — ERGOCALCIFEROL 1.25 MG/1
50000 CAPSULE ORAL WEEKLY
Qty: 4 CAPSULE | Refills: 3 | Status: SHIPPED | OUTPATIENT
Start: 2023-03-22 | End: 2023-04-13

## 2023-03-22 NOTE — TELEPHONE ENCOUNTER
Patient Call Form   Reason for patient call? Pt's  called because their pcp told them she needed to see hematology  They did not have a referral or diagnosis so I advised him to ask his pcp to put in a referral so we can schedule    Patient's primary oncologist? n/a    Name of person the patient was calling for? hopeline    Does the patient issue require a call back? No   If call back required, inform patient that the message will be forwarded to the team and someone will get back to them as soon as possible    Did you relay this information to the patient?  N/A

## 2023-03-23 ENCOUNTER — TELEPHONE (OUTPATIENT)
Dept: HEMATOLOGY ONCOLOGY | Facility: CLINIC | Age: 48
End: 2023-03-23

## 2023-04-03 DIAGNOSIS — F41.1 GENERALIZED ANXIETY DISORDER: ICD-10-CM

## 2023-04-03 RX ORDER — ALPRAZOLAM 1 MG/1
1 TABLET ORAL
Qty: 30 TABLET | Refills: 0 | Status: SHIPPED | OUTPATIENT
Start: 2023-04-03

## 2023-04-23 DIAGNOSIS — E66.01 CLASS 3 SEVERE OBESITY DUE TO EXCESS CALORIES WITHOUT SERIOUS COMORBIDITY WITH BODY MASS INDEX (BMI) OF 40.0 TO 44.9 IN ADULT (HCC): ICD-10-CM

## 2023-04-24 RX ORDER — PHENTERMINE HYDROCHLORIDE 37.5 MG/1
CAPSULE ORAL
Qty: 30 CAPSULE | Refills: 0 | Status: SHIPPED | OUTPATIENT
Start: 2023-04-24

## 2023-04-26 DIAGNOSIS — Z12.39 SCREENING FOR BREAST CANCER: ICD-10-CM

## 2023-04-27 NOTE — RESULT ENCOUNTER NOTE
Carlo Bach  I reviewed your mammogram and it was was normal. We recommend repeating in one year for maintenance. Have a great day.    -Ines Marx

## 2023-05-03 ENCOUNTER — OFFICE VISIT (OUTPATIENT)
Dept: URGENT CARE | Facility: CLINIC | Age: 48
End: 2023-05-03

## 2023-05-03 DIAGNOSIS — J20.9 ACUTE BRONCHITIS, UNSPECIFIED ORGANISM: Primary | ICD-10-CM

## 2023-05-03 RX ORDER — METHYLPREDNISOLONE SODIUM SUCCINATE 125 MG/2ML
125 INJECTION, POWDER, LYOPHILIZED, FOR SOLUTION INTRAMUSCULAR; INTRAVENOUS ONCE
Status: COMPLETED | OUTPATIENT
Start: 2023-05-03 | End: 2023-05-03

## 2023-05-03 RX ORDER — AZITHROMYCIN 250 MG/1
TABLET, FILM COATED ORAL
Qty: 6 TABLET | Refills: 0 | Status: SHIPPED | OUTPATIENT
Start: 2023-05-03 | End: 2023-05-07

## 2023-05-03 RX ORDER — PREDNISONE 20 MG/1
TABLET ORAL
Qty: 13 TABLET | Refills: 0 | Status: SHIPPED | OUTPATIENT
Start: 2023-05-03

## 2023-05-03 RX ADMIN — METHYLPREDNISOLONE SODIUM SUCCINATE 125 MG: 125 INJECTION, POWDER, LYOPHILIZED, FOR SOLUTION INTRAMUSCULAR; INTRAVENOUS at 13:00

## 2023-05-03 NOTE — PATIENT INSTRUCTIONS
I have prescribed an antibiotic for the infection  Please take the antibiotic as prescribed and finish the entire prescription  I recommend that the patient takes an over the counter probiotic or eats yogurt with live cultures in it [place]) to keep good bacteria in the gut and help prevent diarrhea  Wash hands frequently to prevent the spread of infection  Can use over the counter cough and cold medications to help with symptoms  Ibuprofen and/or tylenol as needed for pain or fever  If not improving over the next 7-10 days, follow up with PCP  Prednisone as directed  Albuterol every 4-6 hours as needed

## 2023-05-03 NOTE — PROGRESS NOTES
[facility] Now    NAME: Lisa Oseguera is a 50 y o  female  : 1975    MRN: [id number]  DATE: May 3, 2023  TIME: 1:37 PM    Assessment and Plan   Acute bronchitis, unspecified organism [J20 9]  1  Acute bronchitis, unspecified organism  azithromycin (ZITHROMAX) 250 mg tablet    predniSONE 20 mg tablet    methylPREDNISolone sodium succinate (Solu-MEDROL) injection 125 mg          Patient Instructions     Patient Instructions   I have prescribed an antibiotic for the infection  Please take the antibiotic as prescribed and finish the entire prescription  I recommend that the patient takes an over the counter probiotic or eats yogurt with live cultures in it [place]) to keep good bacteria in the gut and help prevent diarrhea  Wash hands frequently to prevent the spread of infection  Can use over the counter cough and cold medications to help with symptoms  Ibuprofen and/or tylenol as needed for pain or fever  If not improving over the next 7-10 days, follow up with PCP  Prednisone as directed  Albuterol every 4-6 hours as needed  Chief Complaint     Chief Complaint   Patient presents with    Cold Like Symptoms     6 days       History of Present Illness   50year old female here with cold symptoms for the past 6 days  States that she has a dry cough but is finding it difficult to breath  Using albuterol inhaler with minimal relief  Review of Systems   Review of Systems   Constitutional: Positive for fatigue  Negative for appetite change, chills and fever  HENT: Positive for congestion, rhinorrhea and sore throat  Negative for ear discharge, ear pain, facial swelling, postnasal drip, sinus pressure and sneezing  Respiratory: Positive for cough and chest tightness  Negative for shortness of breath and wheezing  Neurological: Negative for headaches         Current Medications     Current Outpatient Medications:     azithromycin (ZITHROMAX) 250 mg tablet, Take 2 tablets today then 1 tablet daily x 4 days, Disp: 6 tablet, Rfl: 0    predniSONE 20 mg tablet, Take 3 daily for 2 days, 2 daily for 2 days, 1 daily for 2 days, then 1/2 daily for 2 days, Disp: 13 tablet, Rfl: 0    albuterol (Ventolin HFA) 90 mcg/act inhaler, Inhale 2 puffs every 4 (four) hours as needed for wheezing, Disp: 18 g, Rfl: 2    ALPRAZolam (XANAX) 1 mg tablet, Take 1 tablet (1 mg total) by mouth daily at bedtime as needed for anxiety, Disp: 30 tablet, Rfl: 0    amLODIPine (NORVASC) 10 mg tablet, Take 0 5 tablets (5 mg total) by mouth daily, Disp: 30 tablet, Rfl: 2    atorvastatin (LIPITOR) 20 mg tablet, TAKE 1 TABLET BY MOUTH EVERY DAY, Disp: 90 tablet, Rfl: 2    Breo Ellipta 100-25 MCG/INH inhaler, INHALE 1 PUFF DAILY RINSE MOUTH AFTER USE , Disp: 1 Inhaler, Rfl: 5    DULoxetine (CYMBALTA) 60 mg delayed release capsule, Take 1 capsule (60 mg total) by mouth 2 (two) times a day, Disp: 180 capsule, Rfl: 0    ergocalciferol (VITAMIN D2) 50,000 units, TAKE 1 CAPSULE BY MOUTH ONE TIME PER WEEK, Disp: 12 capsule, Rfl: 2    metoprolol succinate (TOPROL-XL) 50 mg 24 hr tablet, Take 1 tablet (50 mg total) by mouth daily, Disp: 90 tablet, Rfl: 0    NON FORMULARY, MEDICAL MARIJUANA, Disp: , Rfl:     phentermine 37 5 MG capsule, TAKE 1 CAPSULE BY MOUTH EVERY DAY IN THE MORNING, Disp: 30 capsule, Rfl: 0  No current facility-administered medications for this visit      Current Allergies     Allergies as of 05/03/2023 - Reviewed 05/03/2023   Allergen Reaction Noted    Nicotine polacrilex [nicotine]  09/18/2019    Fluoxetine Anxiety           The following portions of the patient's history were reviewed and updated as appropriate: allergies, current medications, past family history, past medical history, past social history, past surgical history and problem list    Past Medical History:   Diagnosis Date    Anxiety     Depression     Generalized anxiety disorder     H/O: hysterectomy     LAST ASSESSED 30 DEC 2015  Hypertension     Obesity     Ovarian cyst     PVC (premature ventricular contraction)     Urinary tract infection      Past Surgical History:   Procedure Laterality Date    HYSTERECTOMY      TUMOR REMOVAL       Family History   Problem Relation Age of Onset    Diabetes Mother     Substance Abuse Paternal Aunt     Mental illness Paternal Aunt     Substance Abuse Paternal Uncle     Mental illness Paternal Uncle      Social History     Socioeconomic History    Marital status: /Civil Union     Spouse name: Not on file    Number of children: Not on file    Years of education: Not on file    Highest education level: Not on file   Occupational History    Not on file   Tobacco Use    Smoking status: Every Day     Packs/day: 1 00     Years: 30 00     Pack years: 30 00     Types: Cigarettes    Smokeless tobacco: Never   Vaping Use    Vaping Use: Former    Substances: Nicotine, Flavoring   Substance and Sexual Activity    Alcohol use: Not Currently     Comment: occasionally    Drug use: Yes     Types: Marijuana    Sexual activity: Yes     Partners: Male     Birth control/protection: Other   Other Topics Concern    Not on file   Social History Narrative    LIVES WITH SIGNIFICANT OTHER    PETS:  BIRD, CAT, DOG    EMPLOYED    NO CHILDREN     Social Determinants of Health     Financial Resource Strain: Not on file   Food Insecurity: Not on file   Transportation Needs: Not on file   Physical Activity: Not on file   Stress: Not on file   Social Connections: Not on file   Intimate Partner Violence: Not on file   Housing Stability: Not on file     Medications have been verified  Objective   There were no vitals taken for this visit  Physical Exam   Physical Exam  Vitals and nursing note reviewed  Constitutional:       General: She is not in acute distress  Appearance: She is well-developed  HENT:      Head: Normocephalic and atraumatic        Right Ear: Tympanic membrane normal  Left Ear: Tympanic membrane normal       Nose: Congestion and rhinorrhea present  No mucosal edema  Right Sinus: No maxillary sinus tenderness or frontal sinus tenderness  Left Sinus: No maxillary sinus tenderness or frontal sinus tenderness  Mouth/Throat:      Pharynx: No oropharyngeal exudate or posterior oropharyngeal erythema  Eyes:      Conjunctiva/sclera: Conjunctivae normal    Cardiovascular:      Rate and Rhythm: Normal rate and regular rhythm  Heart sounds: Normal heart sounds  No murmur heard  Pulmonary:      Effort: Pulmonary effort is normal       Breath sounds: Wheezing present

## 2023-05-07 ENCOUNTER — OFFICE VISIT (OUTPATIENT)
Dept: URGENT CARE | Facility: CLINIC | Age: 48
End: 2023-05-07

## 2023-05-07 VITALS
TEMPERATURE: 98.6 F | WEIGHT: 178.6 LBS | OXYGEN SATURATION: 97 % | SYSTOLIC BLOOD PRESSURE: 126 MMHG | HEART RATE: 81 BPM | HEIGHT: 62 IN | DIASTOLIC BLOOD PRESSURE: 70 MMHG | RESPIRATION RATE: 20 BRPM | BODY MASS INDEX: 32.87 KG/M2

## 2023-05-07 DIAGNOSIS — J20.9 ACUTE BRONCHITIS, UNSPECIFIED ORGANISM: Primary | ICD-10-CM

## 2023-05-07 RX ORDER — PREDNISONE 20 MG/1
40 TABLET ORAL DAILY
Qty: 6 TABLET | Refills: 0 | Status: SHIPPED | OUTPATIENT
Start: 2023-05-07 | End: 2023-05-10

## 2023-05-07 RX ORDER — BENZONATATE 200 MG/1
200 CAPSULE ORAL 3 TIMES DAILY PRN
Qty: 20 CAPSULE | Refills: 0 | Status: SHIPPED | OUTPATIENT
Start: 2023-05-07

## 2023-05-07 RX ORDER — IPRATROPIUM BROMIDE AND ALBUTEROL SULFATE 2.5; .5 MG/3ML; MG/3ML
3 SOLUTION RESPIRATORY (INHALATION) 4 TIMES DAILY
Qty: 60 ML | Refills: 0 | Status: SHIPPED | OUTPATIENT
Start: 2023-05-07

## 2023-05-07 NOTE — PROGRESS NOTES
[facility] Now        NAME: Rafal Yañez is a 50 y o  female  : 1975    MRN: [id number]  DATE: May 7, 2023  TIME: 1:49 PM    Assessment and Plan   Acute bronchitis, unspecified organism [J20 9]  1  Acute bronchitis, unspecified organism  Covid/Flu-Office Collect    benzonatate (TESSALON) 200 MG capsule    predniSONE 20 mg tablet    ipratropium-albuterol (DUO-NEB) 0 5-2 5 mg/3 mL nebulizer solution      Provided w/ Nebulizer machine, will rx Duoneb treatments  Extended Prednisone 40mg for an additional 3 days  Discontinue rest of rx taper  Covid/Flu pending as pt exposed to co worker w/ reported Influenza  ED if symptoms worsen  Patient Instructions       Follow up with PCP in 3-5 days  Proceed to  ER if symptoms worsen  Chief Complaint     Chief Complaint   Patient presents with   • Cold Like Symptoms     Body aches, congestion, fever tmax 100 1 for 1 week  Recently seen and put on abx and steroids---not helping         History of Present Illness       Patient is a 49 y/o/f [facility] with fever, body aches, cough and congestion  Patient reports symptoms began almost 1 week ago  Pt was prescribed a Zpak and Prednisone on 23 for bronchitis  Pt reports she is not feeling any better  Patient is a long term smoker, has been wheezing the past week  Has also used Albuterol inhaler every few hrs  Cough  This is a new problem  The current episode started in the past 7 days  The problem has been gradually worsening  The problem occurs every few minutes  The cough is productive of sputum  Associated symptoms include a fever, myalgias, nasal congestion, shortness of breath and wheezing  Pertinent negatives include no chest pain, chills, ear pain, rash or sore throat  Review of Systems   Review of Systems   Constitutional: Positive for fever  Negative for chills  HENT: Positive for congestion  Negative for ear pain and sore throat      Eyes: Negative for pain and visual disturbance  Respiratory: Positive for cough, shortness of breath and wheezing  Cardiovascular: Negative for chest pain and palpitations  Gastrointestinal: Negative for abdominal pain and vomiting  Genitourinary: Negative for dysuria and hematuria  Musculoskeletal: Positive for myalgias  Negative for arthralgias and back pain  Skin: Negative for color change and rash  Neurological: Negative for seizures and syncope  All other systems reviewed and are negative          Current Medications       Current Outpatient Medications:   •  albuterol (Ventolin HFA) 90 mcg/act inhaler, Inhale 2 puffs every 4 (four) hours as needed for wheezing, Disp: 18 g, Rfl: 2  •  ALPRAZolam (XANAX) 1 mg tablet, Take 1 tablet (1 mg total) by mouth daily at bedtime as needed for anxiety, Disp: 30 tablet, Rfl: 0  •  amLODIPine (NORVASC) 10 mg tablet, Take 0 5 tablets (5 mg total) by mouth daily, Disp: 30 tablet, Rfl: 2  •  atorvastatin (LIPITOR) 20 mg tablet, TAKE 1 TABLET BY MOUTH EVERY DAY, Disp: 90 tablet, Rfl: 2  •  azithromycin (ZITHROMAX) 250 mg tablet, Take 2 tablets today then 1 tablet daily x 4 days, Disp: 6 tablet, Rfl: 0  •  benzonatate (TESSALON) 200 MG capsule, Take 1 capsule (200 mg total) by mouth 3 (three) times a day as needed for cough, Disp: 20 capsule, Rfl: 0  •  Breo Ellipta 100-25 MCG/INH inhaler, INHALE 1 PUFF DAILY RINSE MOUTH AFTER USE , Disp: 1 Inhaler, Rfl: 5  •  DULoxetine (CYMBALTA) 60 mg delayed release capsule, Take 1 capsule (60 mg total) by mouth 2 (two) times a day, Disp: 180 capsule, Rfl: 0  •  ergocalciferol (VITAMIN D2) 50,000 units, TAKE 1 CAPSULE BY MOUTH ONE TIME PER WEEK, Disp: 12 capsule, Rfl: 2  •  ipratropium-albuterol (DUO-NEB) 0 5-2 5 mg/3 mL nebulizer solution, Take 3 mL by nebulization 4 (four) times a day, Disp: 60 mL, Rfl: 0  •  metoprolol succinate (TOPROL-XL) 50 mg 24 hr tablet, Take 1 tablet (50 mg total) by mouth daily, Disp: 90 tablet, Rfl: 0  •  NON FORMULARY, "MEDICAL MARIJUANA, Disp: , Rfl:   •  phentermine 37 5 MG capsule, TAKE 1 CAPSULE BY MOUTH EVERY DAY IN THE MORNING, Disp: 30 capsule, Rfl: 0  •  predniSONE 20 mg tablet, Take 3 daily for 2 days, 2 daily for 2 days, 1 daily for 2 days, then 1/2 daily for 2 days, Disp: 13 tablet, Rfl: 0  •  predniSONE 20 mg tablet, Take 2 tablets (40 mg total) by mouth daily for 3 days, Disp: 6 tablet, Rfl: 0    Current Allergies     Allergies as of 05/07/2023 - Reviewed 05/07/2023   Allergen Reaction Noted   • Nicotine polacrilex [nicotine]  09/18/2019   • Fluoxetine Anxiety             The following portions of the patient's history were reviewed and updated as appropriate: allergies, current medications, past family history, past medical history, past social history, past surgical history and problem list      Past Medical History:   Diagnosis Date   • Anxiety    • Depression    • Generalized anxiety disorder    • H/O: hysterectomy     LAST ASSESSED 30 DEC 2015   • Hypertension    • Obesity    • Ovarian cyst    • PVC (premature ventricular contraction)    • Urinary tract infection        Past Surgical History:   Procedure Laterality Date   • HYSTERECTOMY     • TUMOR REMOVAL         Family History   Problem Relation Age of Onset   • Diabetes Mother    • Substance Abuse Paternal Aunt    • Mental illness Paternal Aunt    • Substance Abuse Paternal Uncle    • Mental illness Paternal Uncle          Medications have been verified  Objective   /70   Pulse 81   Temp 98 6 °F (37 °C)   Resp 20   Ht 5' 2\" (1 575 m)   Wt 81 kg (178 lb 9 6 oz)   SpO2 97%   BMI 32 67 kg/m²   No LMP recorded  Patient has had a hysterectomy  Physical Exam     Physical Exam  Constitutional:       Appearance: Normal appearance  HENT:      Head: Normocephalic and atraumatic  Nose: Congestion present  Mouth/Throat:      Mouth: Mucous membranes are moist    Eyes:      Extraocular Movements: Extraocular movements intact        " Conjunctiva/sclera: Conjunctivae normal       Pupils: Pupils are equal, round, and reactive to light  Cardiovascular:      Rate and Rhythm: Normal rate and regular rhythm  Pulmonary:      Effort: Pulmonary effort is normal       Breath sounds: Wheezing present  No rhonchi or rales  Musculoskeletal:         General: Normal range of motion  Cervical back: Normal range of motion and neck supple  Skin:     General: Skin is warm and dry  Capillary Refill: Capillary refill takes less than 2 seconds  Neurological:      General: No focal deficit present  Mental Status: She is alert and oriented to person, place, and time     Psychiatric:         Mood and Affect: Mood normal          Behavior: Behavior normal

## 2023-05-07 NOTE — LETTER
May 7, 2023     Patient: Riri Lee   YOB: 1975   Date of Visit: 5/7/2023       To Whom It May Concern: It is my medical opinion that Riri Lee should not return to work until receipt of a negative Covid/Influenza test result  If you have any questions or concerns, please don't hesitate to call           Sincerely,        Gena Patrick PA-C    CC: No Recipients

## 2023-05-08 LAB
FLUAV RNA RESP QL NAA+PROBE: NEGATIVE
FLUBV RNA RESP QL NAA+PROBE: NEGATIVE
SARS-COV-2 RNA RESP QL NAA+PROBE: NEGATIVE

## 2023-05-09 ENCOUNTER — SOCIAL WORK (OUTPATIENT)
Dept: BEHAVIORAL/MENTAL HEALTH CLINIC | Facility: CLINIC | Age: 48
End: 2023-05-09

## 2023-05-09 ENCOUNTER — TELEMEDICINE (OUTPATIENT)
Dept: PSYCHIATRY | Facility: CLINIC | Age: 48
End: 2023-05-09

## 2023-05-09 DIAGNOSIS — F33.1 MODERATE EPISODE OF RECURRENT MAJOR DEPRESSIVE DISORDER (HCC): Primary | ICD-10-CM

## 2023-05-09 DIAGNOSIS — F33.1 MDD (MAJOR DEPRESSIVE DISORDER), RECURRENT EPISODE, MODERATE (HCC): Primary | ICD-10-CM

## 2023-05-09 RX ORDER — ESCITALOPRAM OXALATE 10 MG/1
10 TABLET ORAL DAILY
Qty: 30 TABLET | Refills: 1 | Status: SHIPPED | OUTPATIENT
Start: 2023-05-09

## 2023-05-09 RX ORDER — QUETIAPINE FUMARATE 50 MG/1
50 TABLET, EXTENDED RELEASE ORAL
Qty: 30 TABLET | Refills: 1 | Status: SHIPPED | OUTPATIENT
Start: 2023-05-09

## 2023-05-09 NOTE — PSYCH
Assessment  Deepali Ovalle is a 51 y/o female with PMH significant for Depression and Anxiety that presents for CC of Depression  Patient presents with worsening depressive and anxiety symptoms in the setting of multiple losses  Recommend starting Seroquel XR 50 mg qhs and Escitalopram 10 mg qday as well as outpatient psychiatry and psychotherapy  Patient without any indication for voluntary or involuntary IP psychiatric admission  Crisis Intake  Patient Intake  Special Needs: None reported  Living Arrangement: House (Lives with  )  Can patient return home?: Yes  Address to be Discharge to[de-identified] See facesheet  Patient's Telephone Number: See facesheet  Access to Firearms: No  Type of Work: Works as an - FT  Work History: Full-time  Admission C/ [address] of Residence: [facility]  Patient History  Presenting Problems: Patient entered [facility] displaying depressive symptoms; tearful, poor focus, and shaky  Was accompanied by her   Denied SI/HI, but often feels hopeless  Has thoughts like “what’s the point of being here?” and her  is better off without her  Lost both of her parents back-to-back in 2020 from cancer- was extremely close with her parents  Reported her symptoms started when she had a partial hysterectomy in 2015- felt different afterwards- had an increase in anxiety and depressive symptoms  Reported it intensified after she lost both of her parents in 2020  Has one brother, but currently has no communication with him “I don’t know why ” Has lost 60 pounds within the past year  Sleeps up to one hour and wakes up- has been diagnosed with insomnia through PCP  Struggles to focus on work- feels stressed- initially liked her job, but no longer does  Denied current substance abuse issues- struggled with alcohol in the past, “I drank a lot”  Reported the drinking was replaced with marijuana use- has her medical marijuana card   Smokes marijuana to manage tearfulness and racing "thoughts  Has been diagnosed with MDD in the past  PCP prescribes Xanax to manage symptoms- patient is open to getting evaluated by a psychiatrist  It was also recommended she be assigned to [facility] therapist- patient understands that service is only temporary  Treatment History: No treatment history  Currently in Treatment: No  Medical Problems: High blood pressure, high cholesterol, insomnia  Legal Issues: None reported  Substance Abuse: No (Reported \"drinking a lot\" in the past- no other details given )  Mental Status Exam  Orientation Level: Appropriate for age  Affect: Other (Comment) (Depressed )  Speech: Logical/coherent  Mood: Anxious, Depressed  Thought Content: Appropriate  Hallucination Type: No problems reported or observed  Judgement: Fair  Impulse Control: Poor  Attention Span: Appropriate for age  Memory: Intact  Appetite: Poor (Lost 60 lbs in the past year )  Weight Changes: Lost 60 lbs in the past year  Sleep: Poor (Diagnosed with insomnia )  Total Hours of Sleep: 1 hour of sleep then wakes up  Specific Sleep Problem: Insomnia  ADL Comments: No issues reported  Strengths and Limitations  Patient Strengths: Financially secure, Cooperative, Good support system  Patient Limitations: Difficulty adapting, Poor reasoning ability  Ideations  Current Self Harm/Suicidal Ideation: No  Previous Self Harm/Suicidal Ideation: No  Description of self harming behaviors or thoughts[de-identified] Has thoughts of hopelessness- no plan a and no attempt  Violence Risk to Self: Yes- Within the past 6 months  Current homicidal or violent thoughts toward another: Denies ideations within past 6 months  Previous Plans to Harm Another Person: No  Violence Risk to Others: Denies within past 6 months  Previous History of Violence to Others: No  Provisional Diagnosis  Axis I: F33 1  Axis II: Deferred  Axis III: High cholesterol, high blood pressure, insomnia     C-SSRS  [place] Suicide Severity Rating Scale  C-SSRS Q1   Wish to be " Dead: Yes - In the Past Month  C-SSRS Q2  Suicidal Thoughts: Yes - In the Past Month  C-SSRS Q3  Suicidal Thoughts with Method (without Specific Plan or Intent to Act): No - In the Past Month  C-SSRS Q4  Suicidal Intent (without Specific Plan): No - In the Past Month  C-SSRS Q5  Suicide Intent with Specific Plan: No - In the Past Month  C-SSRS Q6  Suicide Behavior Question: No  *Risk Level*: Low Risk          Patient was referred by: Self or Family    Visit start and stop times:    05/09/23           Crisis Alert Form    A crisis alert form was completed and faxed to [place] [address] [facility]     Phone: [phone] Fax: [phone]

## 2023-05-09 NOTE — PROGRESS NOTES
Assessment      Crisis Intake  Patient Intake  Special Needs: None reported  Living Arrangement: House (Lives with  )  Can patient return home?: Yes  Address to be Discharge to[de-identified] See facesheet  Patient's Telephone Number: See facesheet  Access to Firearms: No  Type of Work: Works as an - FT  Work History: Full-time  Admission INDIRA/ [address] of Residence: [facility]  Patient History  Presenting Problems: Patient entered [facility] displaying depressive symptoms; tearful, poor focus, and shaky  Was accompanied by her   Denied SI/HI, but often feels hopeless  Has thoughts like “what’s the point of being here?” and her  is better off without her  Lost both of her parents back-to-back in 2020 from cancer- was extremely close with her parents  Reported her symptoms started when she had a partial hysterectomy in 2015- felt different afterwards- had an increase in anxiety and depressive symptoms  Reported it intensified after she lost both of her parents in 2020  Has one brother, but currently has no communication with him “I don’t know why ” Has lost 60 pounds within the past year  Sleeps up to one hour and wakes up- has been diagnosed with insomnia through PCP  Struggles to focus on work- feels stressed- initially liked her job, but no longer does  Denied current substance abuse issues- struggled with alcohol in the past, “I drank a lot”  Reported the drinking was replaced with marijuana use- has her medical marijuana card  Smokes marijuana to manage tearfulness and racing thoughts  Has been diagnosed with MDD in the past  PCP prescribes Xanax to manage symptoms- patient is open to getting evaluated by a psychiatrist  It was also recommended she be assigned to [facility] therapist- patient understands that service is only temporary  Treatment History: No treatment history  Currently in Treatment: No  Medical Problems: High blood pressure, high cholesterol, insomnia    Legal Issues: None "reported  Substance Abuse: No (Reported \"drinking a lot\" in the past- no other details given )  Mental Status Exam  Orientation Level: Appropriate for age  Affect: Other (Comment) (Depressed )  Speech: Logical/coherent  Mood: Anxious, Depressed  Thought Content: Appropriate  Hallucination Type: No problems reported or observed  Judgement: Fair  Impulse Control: Poor  Attention Span: Appropriate for age  Memory: Intact  Appetite: Poor (Lost 60 lbs in the past year )  Weight Changes: Lost 60 lbs in the past year  Sleep: Poor (Diagnosed with insomnia )  Total Hours of Sleep: 1 hour of sleep then wakes up  Specific Sleep Problem: Insomnia  ADL Comments: No issues reported  Strengths and Limitations  Patient Strengths: Financially secure, Cooperative, Good support system  Patient Limitations: Difficulty adapting, Poor reasoning ability  Ideations  Current Self Harm/Suicidal Ideation: No  Previous Self Harm/Suicidal Ideation: No  Description of self harming behaviors or thoughts[de-identified] Has thoughts of hopelessness- no plan a and no attempt  Violence Risk to Self: Yes- Within the past 6 months  Current homicidal or violent thoughts toward another: Denies ideations within past 6 months  Previous Plans to Harm Another Person: No  Violence Risk to Others: Denies within past 6 months  Previous History of Violence to Others: No  Provisional Diagnosis  Axis I: F33 1  Axis II: Deferred  Axis III: High cholesterol, high blood pressure, insomnia    C-SSRS  [place] Suicide Severity Rating Scale  C-SSRS Q1   Wish to be Dead: Yes - In the Past Month  C-SSRS Q2  Suicidal Thoughts: Yes - In the Past Month  C-SSRS Q3  Suicidal Thoughts with Method (without Specific Plan or Intent to Act): No - In the Past Month  C-SSRS Q4  Suicidal Intent (without Specific Plan): No - In the Past Month  C-SSRS Q5  Suicide Intent with Specific Plan: No - In the Past Month  C-SSRS Q6  Suicide Behavior Question: No  *Risk Level*: Low Risk      Patient was " referred by: Self or Family    Visit start and stop times:    05/09/23  Start Time: 1630  Stop Time: 1710  Total Visit Time: 40 minutes        Discharge and Safety    This writer discussed the patients current presentation and recommended discharge plan with scheduled therapy session         This writer and the patient completed a safety plan   The patient was provided with a copy of their safety plan with encouragement to utilize the plan following discharge  In addition, the patient was instructed to call local [facility] crisis, other crisis services, 911 or to go to the nearest ER immediately if their situation changes at any time  This writer discussed discharge plans with the patient and family who agrees with and understands the discharge plans      SAFETY PLAN  Warning Signs (thoughts, images, mood, behavior, situations) of a potential crisis: Tearfulness, poor focus, racing thoughts, hopelessness  Coping Skills (what can I do to take my mind off the problem, or to keep myself safe): Make flower arrangements, talk to , watch TV, go shopping, kayak  Outside Support (who can I reach out to for support and help):            National Suicide Prevention Hotline:  4-658.404.1829    [facility] [address] [facility] [phone] - LVF Crisis/Mobile Crisis   [address]: [phone]  [facility]: [address] [facility] [address] [facility] [address] [facility] [phone] - Crisis   [phone] - Peer Support Talk Line (1-9pm daily)  [phone] - Teen Support Talk Line (1-9pm daily)  [address] [facility] [address] [facility] [address] [facility] [address]) [phone] - [address] [facility]

## 2023-05-09 NOTE — PATIENT INSTRUCTIONS
Discharge and Safety    This writer discussed the patients current presentation and recommended discharge plan with scheduled therapy session  This writer and the patient completed a safety plan  The patient was provided with a copy of their safety plan with encouragement to utilize the plan following discharge  In addition, the patient was instructed to call local [facility] crisis, other crisis services, [facility] or to go to the nearest ER immediately if their situation changes at any time  This writer discussed discharge plans with the patient and family who agrees with and understands the discharge plans  SAFETY PLAN  Warning Signs (thoughts, images, mood, behavior, situations) of a potential crisis: Tearfulness, poor focus, racing thoughts, hopelessness  Coping Skills (what can I do to take my mind off the problem, or to keep myself safe): Make flower arrangements, talk to , watch TV, go shopping, kayak  Outside Support (who can I reach out to for support and help):            National Suicide Prevention Hotline:  6-209.210.1845    [facility] [address] [facility] [phone] - [facility] Crisis/Mobile Crisis   [address]: [phone]  [facility]: [address] [facility] [address] [facility] [phone] - Crisis   [phone] - Peer Support Talk Line (1-9pm daily)  [phone] - Teen Support Talk Line (1-9pm daily)  [address] [facility] [address] [facility] [address] [place] [address]) [phone] - [address] [facility]

## 2023-05-10 DIAGNOSIS — F41.1 GENERALIZED ANXIETY DISORDER: ICD-10-CM

## 2023-05-10 RX ORDER — ALPRAZOLAM 1 MG/1
1 TABLET ORAL
Qty: 30 TABLET | Refills: 0 | Status: SHIPPED | OUTPATIENT
Start: 2023-05-10

## 2023-05-12 ENCOUNTER — TELEPHONE (OUTPATIENT)
Dept: PSYCHIATRY | Facility: CLINIC | Age: 48
End: 2023-05-12

## 2023-05-12 ENCOUNTER — OFFICE VISIT (OUTPATIENT)
Dept: PSYCHIATRY | Facility: CLINIC | Age: 48
End: 2023-05-12

## 2023-05-12 DIAGNOSIS — F33.1 MODERATE EPISODE OF RECURRENT MAJOR DEPRESSIVE DISORDER (HCC): Primary | ICD-10-CM

## 2023-05-12 DIAGNOSIS — F43.10 PTSD (POST-TRAUMATIC STRESS DISORDER): ICD-10-CM

## 2023-05-12 DIAGNOSIS — F41.1 GENERALIZED ANXIETY DISORDER: ICD-10-CM

## 2023-05-12 NOTE — TELEPHONE ENCOUNTER
"Telephone call to PT following up from [facility] visit  PT reports feeling \"a lot better  \"    PT picked medication up from the pharmacy and began taking the medication  PT took Seroquel one time but felt too exhausted in the morning and did not take the medication again last night  PT is scheduled with Negro Burns today at 1pm for medication management  PT will discuss concerns with medication at this appointment  PT thanked CM for following up and denies needing anything additional at this time     "
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
## 2023-05-16 ENCOUNTER — DOCUMENTATION (OUTPATIENT)
Dept: BEHAVIORAL/MENTAL HEALTH CLINIC | Facility: CLINIC | Age: 48
End: 2023-05-16

## 2023-05-22 DIAGNOSIS — E66.01 CLASS 3 SEVERE OBESITY DUE TO EXCESS CALORIES WITHOUT SERIOUS COMORBIDITY WITH BODY MASS INDEX (BMI) OF 40.0 TO 44.9 IN ADULT (HCC): ICD-10-CM

## 2023-05-22 DIAGNOSIS — I49.3 PVC (PREMATURE VENTRICULAR CONTRACTION): ICD-10-CM

## 2023-05-22 RX ORDER — PHENTERMINE HYDROCHLORIDE 37.5 MG/1
37.5 CAPSULE ORAL EVERY MORNING
Qty: 30 CAPSULE | Refills: 1 | Status: SHIPPED | OUTPATIENT
Start: 2023-05-22

## 2023-05-22 RX ORDER — METOPROLOL SUCCINATE 50 MG/1
TABLET, EXTENDED RELEASE ORAL
Qty: 90 TABLET | Refills: 0 | Status: SHIPPED | OUTPATIENT
Start: 2023-05-22

## 2023-06-09 ENCOUNTER — OFFICE VISIT (OUTPATIENT)
Dept: PSYCHIATRY | Facility: CLINIC | Age: 48
End: 2023-06-09

## 2023-06-09 DIAGNOSIS — F43.10 PTSD (POST-TRAUMATIC STRESS DISORDER): ICD-10-CM

## 2023-06-09 DIAGNOSIS — F33.1 MODERATE EPISODE OF RECURRENT MAJOR DEPRESSIVE DISORDER (HCC): Primary | ICD-10-CM

## 2023-06-09 RX ORDER — ESCITALOPRAM OXALATE 10 MG/1
10 TABLET ORAL DAILY
Qty: 90 TABLET | Refills: 0 | Status: SHIPPED | OUTPATIENT
Start: 2023-06-09

## 2023-06-09 RX ORDER — QUETIAPINE FUMARATE 25 MG/1
25 TABLET, FILM COATED ORAL
Qty: 90 TABLET | Refills: 0 | Status: SHIPPED | OUTPATIENT
Start: 2023-06-09
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
## 2023-06-16 DIAGNOSIS — E66.01 CLASS 3 SEVERE OBESITY DUE TO EXCESS CALORIES WITHOUT SERIOUS COMORBIDITY WITH BODY MASS INDEX (BMI) OF 40.0 TO 44.9 IN ADULT (HCC): ICD-10-CM

## 2023-06-16 RX ORDER — PHENTERMINE HYDROCHLORIDE 37.5 MG/1
37.5 CAPSULE ORAL EVERY MORNING
Qty: 30 CAPSULE | Refills: 0 | Status: SHIPPED | OUTPATIENT
Start: 2023-06-16

## 2023-06-23 ENCOUNTER — OFFICE VISIT (OUTPATIENT)
Dept: INTERNAL MEDICINE CLINIC | Facility: CLINIC | Age: 48
End: 2023-06-23
Payer: COMMERCIAL

## 2023-06-23 VITALS
HEART RATE: 73 BPM | BODY MASS INDEX: 32.04 KG/M2 | SYSTOLIC BLOOD PRESSURE: 130 MMHG | HEIGHT: 62 IN | DIASTOLIC BLOOD PRESSURE: 62 MMHG | TEMPERATURE: 98.2 F | WEIGHT: 174.13 LBS | OXYGEN SATURATION: 98 %

## 2023-06-23 DIAGNOSIS — F33.1 MODERATE EPISODE OF RECURRENT MAJOR DEPRESSIVE DISORDER (HCC): Primary | ICD-10-CM

## 2023-06-23 PROCEDURE — 99213 OFFICE O/P EST LOW 20 MIN: CPT | Performed by: PHYSICIAN ASSISTANT

## 2023-06-23 NOTE — PROGRESS NOTES
Name: Landon Escalante      : 1975      MRN: [id number]  Encounter Provider: Rudy Patino PA-C  Encounter Date: 2023   Encounter department: [address] [place] [address]  Moderate episode of recurrent major depressive disorder [facility])  Assessment & Plan:  Pts symptoms are stable with current regime  No changes at present  Subjective      Pt presents for follow up  Her BP is controlled and she has tolerated the switch to norvasc well  She did have a decline in her mood and was evaluated at our walk in psychiatry clinic  She was changed from cymbalta to lexapro and seroquel and has had a nice improvement in symptoms  She continues to do well with her weight loss efforts  She denies any new complaints or concerns    Review of Systems   Constitutional: Negative for chills and fever  HENT: Negative for congestion, ear pain, hearing loss, postnasal drip, rhinorrhea, sinus pressure, sinus pain, sore throat and trouble swallowing  Eyes: Negative for pain and visual disturbance  Respiratory: Negative for cough, chest tightness, shortness of breath and wheezing  Cardiovascular: Negative  Negative for chest pain, palpitations and leg swelling  Gastrointestinal: Negative for abdominal pain, blood in stool, constipation, diarrhea, nausea and vomiting  Endocrine: Negative for cold intolerance, heat intolerance, polydipsia, polyphagia and polyuria  Genitourinary: Negative for difficulty urinating, dysuria, flank pain and urgency  Musculoskeletal: Negative for arthralgias, back pain, gait problem and myalgias  Skin: Negative for rash  Allergic/Immunologic: Negative  Neurological: Negative for dizziness, weakness, light-headedness and headaches  Hematological: Negative  Psychiatric/Behavioral: Negative for behavioral problems, dysphoric mood and sleep disturbance  The patient is not nervous/anxious          Current Outpatient Medications on "File Prior to Visit   Medication Sig   • albuterol (Ventolin HFA) 90 mcg/act inhaler Inhale 2 puffs every 4 (four) hours as needed for wheezing   • amLODIPine (NORVASC) 10 mg tablet TAKE 1/2 TABLET BY MOUTH DAILY   • atorvastatin (LIPITOR) 20 mg tablet TAKE 1 TABLET BY MOUTH EVERY DAY   • Breo Ellipta 100-25 MCG/INH inhaler INHALE 1 PUFF DAILY RINSE MOUTH AFTER USE  • ergocalciferol (VITAMIN D2) 50,000 units TAKE 1 CAPSULE BY MOUTH ONE TIME PER WEEK   • escitalopram (Lexapro) 10 mg tablet Take 1 tablet (10 mg total) by mouth daily   • ipratropium-albuterol (DUO-NEB) 0 5-2 5 mg/3 mL nebulizer solution Take 3 mL by nebulization 4 (four) times a day   • metoprolol succinate (TOPROL-XL) 50 mg 24 hr tablet TAKE 1 TABLET BY MOUTH EVERY DAY   • NON FORMULARY MEDICAL MARIJUANA   • phentermine 37 5 MG capsule Take 1 capsule (37 5 mg total) by mouth every morning   • QUEtiapine (SEROquel) 25 mg tablet Take 1 tablet (25 mg total) by mouth daily at bedtime       Objective     /62 (BP Location: Left arm, Patient Position: Sitting)   Pulse 73   Temp 98 2 °F (36 8 °C)   Ht 5' 2\" (1 575 m)   Wt 79 kg (174 lb 2 oz)   SpO2 98%   BMI 31 85 kg/m²     Physical Exam  Vitals and nursing note reviewed  Constitutional:       General: She is not in acute distress  Appearance: She is well-developed  She is not diaphoretic  HENT:      Head: Normocephalic and atraumatic  Right Ear: External ear normal       Left Ear: External ear normal       Nose: Nose normal       Mouth/Throat:      Pharynx: No oropharyngeal exudate  Eyes:      General: No scleral icterus  Right eye: No discharge  Left eye: No discharge  Conjunctiva/sclera: Conjunctivae normal       Pupils: Pupils are equal, round, and reactive to light  Neck:      Thyroid: No thyromegaly  Cardiovascular:      Rate and Rhythm: Normal rate and regular rhythm  Heart sounds: Normal heart sounds  No murmur heard  No friction rub   No " gallop  Pulmonary:      Effort: Pulmonary effort is normal  No respiratory distress  Breath sounds: Normal breath sounds  No wheezing or rales  Abdominal:      General: Bowel sounds are normal  There is no distension  Palpations: Abdomen is soft  Tenderness: There is no abdominal tenderness  Musculoskeletal:         General: No tenderness or deformity  Normal range of motion  Cervical back: Normal range of motion and neck supple  Skin:     General: Skin is warm and dry  Neurological:      Mental Status: She is alert and oriented to person, place, and time  Cranial Nerves: No cranial nerve deficit  Psychiatric:         Behavior: Behavior normal          Thought Content:  Thought content normal          Judgment: Judgment normal        Eden Coker PA-C

## 2023-07-17 DIAGNOSIS — E66.01 CLASS 3 SEVERE OBESITY DUE TO EXCESS CALORIES WITHOUT SERIOUS COMORBIDITY WITH BODY MASS INDEX (BMI) OF 40.0 TO 44.9 IN ADULT (HCC): ICD-10-CM

## 2023-07-17 RX ORDER — PHENTERMINE HYDROCHLORIDE 37.5 MG/1
37.5 CAPSULE ORAL EVERY MORNING
Qty: 30 CAPSULE | Refills: 0 | Status: SHIPPED | OUTPATIENT
Start: 2023-07-17

## 2023-07-18 ENCOUNTER — OFFICE VISIT (OUTPATIENT)
Dept: BEHAVIORAL/MENTAL HEALTH CLINIC | Facility: CLINIC | Age: 48
End: 2023-07-18
Payer: COMMERCIAL

## 2023-07-18 DIAGNOSIS — F33.1 MDD (MAJOR DEPRESSIVE DISORDER), RECURRENT EPISODE, MODERATE (HCC): Primary | ICD-10-CM

## 2023-07-18 DIAGNOSIS — F43.10 PTSD (POST-TRAUMATIC STRESS DISORDER): ICD-10-CM

## 2023-07-18 PROCEDURE — 90791 PSYCH DIAGNOSTIC EVALUATION: CPT

## 2023-07-18 NOTE — BH CRISIS PLAN
Client Name: Jonathan Lee       Client YOB: 1975  : 1975    Treatment Team (include name and contact information):     Psychotherapist: Sobia Kinsey Provider  Isabella Mercado PA-C  [address] [place] [address]  [phone]    Type of Plan   * Child plans (children 15 yo and younger) must be completed and signed by the child's legal guardian   * Plans for all individuals 15 yo and above must be signed by the client. Plan Type: adolescent/adult (15 and over) Initial      My Personal Strengths are (in the client's own words):  "caring, animal lover, kind, crafts (flower arrangents), fixer (calming people down when they are angry), good sense of humor"  The stressors and triggers that may put me at risk are:  jealousy, death of parents, difficulty with anger management, everyday stressors and ongoing anxiety    Coping skills I can use to keep myself calm and safe:  Physical activity and Other (describe) cleaning, crafting, puzzles    Coping skills/supports I can use to maintain abstinence from substance use:   Not Applicable    The people that provide me with help and support: (Include name, contact, and how they can help)   Support person #1: Laura Garcia    * Phone number: in cell phone    * How can they help me? Listens, makes me feel better     Support person #2: Brian Ortega    * Phone number: in cell phone    * How can they help me? Gives me ideas on how to cope      Support person #3: Chad Sierra    * Phone number: in cell phone    * How can they help me?  Puts things into perspective     In the past, the following has helped me in times of crisis:    Being with other people, Taking medications, Taking a walk or exercising and Watching television or a movie      If it is an emergency and you need immediate help, call     If there is a possibility of danger to yourself or others, call the following crisis hotline resources:     Adult Crisis Numbers  Suicide Prevention Hotline - Dial 9-8-8  [place]: [address]: [address]: [address] [facility] [address]: [phone]  [address] [place] [address] Street: [phone]  [facility]: [address]  Sw: [address]: [phone] (daytime). [phone] (after hours, weekends, holidays)     Child/Adolescent Crisis Numbers   [facility]: [address]: [phone]   Stefany Bryant: [phone]   [address] [place] [address] Street: [phone]    Please note: Some [facility] do not have a separate number for Child/Adolescent specific crisis. If your county is not listed under Child/Adolescent, please call the adult number for your county     National Talk to Text Line   All Ages - [phone]    In the event your feelings become unmanageable, and you cannot reach your support system, you will call 911 immediately or go to the nearest hospital emergency room.

## 2023-07-18 NOTE — PSYCH
Behavioral Health Psychotherapy Assessment    Date of Initial Psychotherapy Assessment: 07/18/23  Referral Source: [facility]  Has a release of information been signed for the referral source? No    Preferred Name: Jonathan Lee  Preferred Pronouns: She/her  YOB: 1975 Age: 50 y.o. Sex assigned at birth: female   Gender Identity: female  Race:   Preferred Language: English    Emergency Contact:  Full Name: Laura Garcia  Relationship to Client:   Contact information: [phone]    Primary Care Physician:  Isabella Mercado PA-C  [address] [place] [address]  [phone]  Has a release of information been signed? No    Physical Health History:  Past surgical procedures: hysterectomy 2015  Do you have a history of any of the following: other none  Do you have any mobility issues? No    Relevant Family History:  Father's side of the family had phobias and alcoholism   Brother- depression    Presenting Problem (What brings you in?)  Per Virgil Sparks 5/9/23 [facility] CIS: Patient entered [facility] displaying depressive symptoms; tearful, poor focus, and shaky. Was accompanied by her . Denied SI/HI, but often feels hopeless. Has thoughts like “what’s the point of being here?” and her  is better off without her. Lost both of her parents back-to-back in 2020 from cancer- was extremely close with her parents. Reported her symptoms started when she had a partial hysterectomy in 2015- felt different afterwards- had an increase in anxiety and depressive symptoms. Reported it intensified after she lost both of her parents in 2020. Has one brother, but currently has no communication with him “I don’t know why.” Has lost 60 pounds within the past year. Sleeps up to one hour and wakes up- has been diagnosed with insomnia through PCP. Struggles to focus on work- feels stressed- initially liked her job, but no longer does.  Denied current substance abuse issues- struggled with alcohol in the past, “I drank a lot”. Reported the drinking was replaced with marijuana use- has her medical marijuana card. Smokes marijuana to manage tearfulness and racing thoughts. Has been diagnosed with MDD in the past. PCP prescribes Xanax to manage symptoms- patient is open to getting evaluated by a psychiatrist. It was also recommended she be assigned to [facility] therapist- patient understands that service is only temporary. Per Omer Healy PA-C 2023:    51 yo , employed female, no children, with hx of depression and anxiety referred from [facility]. Was seen there on  and started in lexapro and seroquel xr. Is finding seroquel too sedating. Yara Hopper reports hx of depression and anxiety since hysterectomy in  and exacerbated by death of both parents in . Has hx of trauma. Currently uses medical marijuana. Yara Hopper often experiences images of her parents before they  and is very fearful her  will die. Her own recent experience with first mammogram and consult appt with hematology caused significant apprehension. Yara Hopper states she is sober x 8 yrs. Has hx of difficulty stopping drinking once she starts and blackouts from alcohol. Was smoking 2 ppd x 30 yrs -down to 15 cig/d. Yara Hopper reports excessive, unreasonable worry; excessive cleaning/straightening/counting; feels bad about herself if she doesn;t get her to do list done; anger escalates to yelling. Was given Burns Depression Inv =78 (extreme). Highest scores: sadness/unhappiness, discouragement, hopelessness, low self-esteem, worthlessness/inadequacy; criticizing self/blaming others; loneliness, loss of motivation, loss of interest, avoidance of work/activities, anhedonia, poor sleep with frequent awakenings, worry about health. Denies SI. Was given PTSD checklist (PCL-C). Checked mod-extremely on 13 of 17 symptoms.   Extremely: repeated, disturbing memories/thoughts/images; very upset when reminded of trauma; physical reactions when reminded; avoids activities/situations that are reminders of past; loss of interest, feels distant/cut off from others; foreshortened future, sleep problems. Mental Health Advance Directive:  Do you currently have a Mental Health Advance Directive? no    Diagnosis:   Diagnosis ICD-10-CM Associated Orders   1. MDD (major depressive disorder), recurrent episode, moderate (HCC)  F33.1       2. PTSD (post-traumatic stress disorder)  F43.10           Initial Assessment:     Current Mental Status:    Appearance: appropriate and casual      Behavior/Manner: cooperative      Affect/Mood:  Sad    Speech:  Normal and articulate    Sleep:  Interrupted    Oriented to: oriented to self, oriented to place and oriented to time       Clinical Symptoms    Depression: yes      Anxiety: yes      Depression Symptoms: depressed mood, restlessness, serious loss of interest in things, excessive crying, social isolation and irritable      Anxiety Symptoms: excessive worry, irritable, nervous/anxious and restlessness      Have you ever been assaultive to others or the environment: No      Have you ever been self-injurious: No      Counseling History:  Previous Counseling or Treatment  (Mental Health or Drug & Alcohol): No    Have you previously taken psychiatric medications: Yes    Previous Medications Attempted:  Cymbalta, Effexor    Suicide Risk Assessment  Have you ever had a suicide attempt: No    Have you had incidents of suicidal ideation: No    Are you currently experiencing suicidal thoughts: No      Substance Abuse/Addiction Assessment:  Alcohol: Yes    Amount:  Socially  or when her dog    Heroin: No    Fentanyl: No    Opiates: No    Cocaine: No    Amphetamines: No    Hallucinogens: No    Club Drugs: No    Benzodiazepines: No    Other Rx Meds: No    Marijuana: Yes    Frequency:  Daily  Method:  Smoke/pipe  Tobacco/Nicotine:  Yes    Amount:  15 cigerattes a day  Method:  Smoke/pipe  Are you interested in resources for smoking cessation: Yes    Have you experienced blackouts as a result of substance use: Yes    Have you had any periods of abstinence: No    Have you experienced symptoms of withdrawal: No    Have you ever overdosed on any substances?: No    Are you currently using any Medication Assisted Treatment for Substance Use: No      Disordered Eating History:  Do you have a history of disordered eating: Yes    Type of disordered eating: restrictive eating pattern      Social Determinants of Health:    SDOH:  Stress    Trauma and Abuse History:    Have you ever been abused: No      Legal History:    Have you ever been arrested  or had a DUI: No      Have you been incarcerated: No      Are you currently on parole/probation: No      Any current Children and Youth involvement: No      Any pending legal charges: No      Relationship History:    Current marital status:       Natural Supports:  Friends    Relationship History:  Good relationship with , good communication   Some friends that she can talk to  Has a brother, but has not talked to since their parents      Employment History    Are you currently employed: Yes      Employer/ Job title:  [address] [place] [address] [place] of income/financial support:  Work     History:      Status: no history of [address] duty  Educational History:     Have you ever been diagnosed with a learning disability: No      Highest level of education:  High school graduate    Have you ever had an IEP or 504-plan: No      Do you need assistance with reading or writing: No      Recommended Treatment:     Psychotherapy:  Individual sessions    Frequency:  2 times    Session frequency:  Monthly      Visit start and stop times:    23  Start Time: 1800  Stop Time: 184  Total Visit Time: 47 minutes

## 2023-07-27 ENCOUNTER — OFFICE VISIT (OUTPATIENT)
Dept: PSYCHIATRY | Facility: CLINIC | Age: 48
End: 2023-07-27

## 2023-07-27 DIAGNOSIS — F33.1 MODERATE EPISODE OF RECURRENT MAJOR DEPRESSIVE DISORDER (HCC): Primary | ICD-10-CM

## 2023-07-27 DIAGNOSIS — F43.10 PTSD (POST-TRAUMATIC STRESS DISORDER): ICD-10-CM

## 2023-07-27 DIAGNOSIS — F41.1 GENERALIZED ANXIETY DISORDER: ICD-10-CM
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
## 2023-08-01 ENCOUNTER — OFFICE VISIT (OUTPATIENT)
Dept: BEHAVIORAL/MENTAL HEALTH CLINIC | Facility: CLINIC | Age: 48
End: 2023-08-01
Payer: COMMERCIAL

## 2023-08-01 DIAGNOSIS — F33.1 MDD (MAJOR DEPRESSIVE DISORDER), RECURRENT EPISODE, MODERATE (HCC): Primary | ICD-10-CM

## 2023-08-01 DIAGNOSIS — F43.10 PTSD (POST-TRAUMATIC STRESS DISORDER): ICD-10-CM

## 2023-08-01 PROCEDURE — 90834 PSYTX W PT 45 MINUTES: CPT

## 2023-08-15 ENCOUNTER — OFFICE VISIT (OUTPATIENT)
Dept: BEHAVIORAL/MENTAL HEALTH CLINIC | Facility: CLINIC | Age: 48
End: 2023-08-15
Payer: COMMERCIAL

## 2023-08-15 DIAGNOSIS — F43.10 PTSD (POST-TRAUMATIC STRESS DISORDER): ICD-10-CM

## 2023-08-15 DIAGNOSIS — F33.1 MDD (MAJOR DEPRESSIVE DISORDER), RECURRENT EPISODE, MODERATE (HCC): Primary | ICD-10-CM

## 2023-08-15 PROCEDURE — 90834 PSYTX W PT 45 MINUTES: CPT

## 2023-08-15 NOTE — PSYCH
Behavioral Health Psychotherapy Progress Note    Psychotherapy Provided: Individual Psychotherapy     1. MDD (major depressive disorder), recurrent episode, moderate ([address])        2. PTSD (post-traumatic stress disorder)            Goals addressed in session: Goal 1     DATA: Met with Beatrice Beaver. Session began with open ended questions to gain feedback on mood. She expressed struggling with depressive symptoms over the last two days. She reported her mother-in-law was in the ICU over the weekend and this triggered memories of when her mother was there. She processed sadness and her triggers, such as the noises of the hospital and the nurses. She also processed feeling like her  and father-in-law did not ask questions and she feels lost on what to expect with her mother-in-law. She expressed wanted to be prepared for another loss in her life. Processed grief and loss. She expressed feeling that she is better handling her grief and depressive symptoms within the last two weeks. She was able to identify coping skills and activities that she enjoys that she has done been able to get herself to do until now, such as time outside and yard work. Work has been a recent stressor for her. She expressed that she does not need to work, but used to enjoy it. 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 She reported she feels this can be beneficial in assisting to increase her mood. During this session, this clinician used the following therapeutic modalities: Client-centered Therapy, Cognitive Behavioral Therapy and Supportive Psychotherapy    Substance Abuse was not addressed during this session. If the client is diagnosed with a co-occurring substance use disorder, please indicate any changes in the frequency or amount of use: n/a.  Stage of change for addressing substance use diagnoses: No substance use/Not applicable    ASSESSMENT: Queenie Moyer presents with a Euthymic/ normal mood. her affect is Normal range and intensity, which is congruent, with her mood and the content of the session. The client has made progress on their goals. Queenie Moyer presents with a none risk of suicide, none risk of self-harm, and none risk of harm to others. For any risk assessment that surpasses a "low" rating, a safety plan must be developed. A safety plan was indicated: no  If yes, describe in detail n/a    PLAN: Between sessions, Queenie Moyer will use natural supports and coping skills. At the next session, the therapist will use Bereavement Therapy and Cognitive Behavioral Therapy to address grief and emotion regulation. Behavioral Health Treatment Plan and Discharge Planning: Queenie Moyer is aware of and agrees to continue to work on their treatment plan. They have identified and are working toward their discharge goals.  yes    Visit start and stop times:    08/15/23  Start Time: 1700  Stop Time: 1750  Total Visit Time: 50 minutes

## 2023-08-17 DIAGNOSIS — E66.01 CLASS 3 SEVERE OBESITY DUE TO EXCESS CALORIES WITHOUT SERIOUS COMORBIDITY WITH BODY MASS INDEX (BMI) OF 40.0 TO 44.9 IN ADULT (HCC): ICD-10-CM

## 2023-08-17 DIAGNOSIS — I49.3 PVC (PREMATURE VENTRICULAR CONTRACTION): ICD-10-CM

## 2023-08-17 RX ORDER — METOPROLOL SUCCINATE 50 MG/1
TABLET, EXTENDED RELEASE ORAL
Qty: 90 TABLET | Refills: 0 | Status: SHIPPED | OUTPATIENT
Start: 2023-08-17

## 2023-08-17 RX ORDER — PHENTERMINE HYDROCHLORIDE 37.5 MG/1
37.5 CAPSULE ORAL EVERY MORNING
Qty: 30 CAPSULE | Refills: 0 | Status: SHIPPED | OUTPATIENT
Start: 2023-08-17

## 2023-09-06 DIAGNOSIS — F33.1 MODERATE EPISODE OF RECURRENT MAJOR DEPRESSIVE DISORDER (HCC): ICD-10-CM

## 2023-09-06 DIAGNOSIS — F43.10 PTSD (POST-TRAUMATIC STRESS DISORDER): ICD-10-CM

## 2023-09-06 RX ORDER — ESCITALOPRAM OXALATE 10 MG/1
10 TABLET ORAL DAILY
Qty: 90 TABLET | Refills: 0 | Status: SHIPPED | OUTPATIENT
Start: 2023-09-06

## 2023-09-06 RX ORDER — QUETIAPINE FUMARATE 25 MG/1
25 TABLET, FILM COATED ORAL
Qty: 90 TABLET | Refills: 0 | Status: SHIPPED | OUTPATIENT
Start: 2023-09-06

## 2023-09-12 ENCOUNTER — OFFICE VISIT (OUTPATIENT)
Dept: BEHAVIORAL/MENTAL HEALTH CLINIC | Facility: CLINIC | Age: 48
End: 2023-09-12
Payer: COMMERCIAL

## 2023-09-12 DIAGNOSIS — F33.1 MDD (MAJOR DEPRESSIVE DISORDER), RECURRENT EPISODE, MODERATE (HCC): Primary | ICD-10-CM

## 2023-09-12 PROCEDURE — 90832 PSYTX W PT 30 MINUTES: CPT

## 2023-09-15 DIAGNOSIS — E66.01 CLASS 3 SEVERE OBESITY DUE TO EXCESS CALORIES WITHOUT SERIOUS COMORBIDITY WITH BODY MASS INDEX (BMI) OF 40.0 TO 44.9 IN ADULT (HCC): ICD-10-CM

## 2023-09-15 RX ORDER — PHENTERMINE HYDROCHLORIDE 37.5 MG/1
37.5 CAPSULE ORAL EVERY MORNING
Qty: 30 CAPSULE | Refills: 0 | Status: SHIPPED | OUTPATIENT
Start: 2023-09-15

## 2023-09-15 NOTE — PSYCH
Behavioral Health Psychotherapy Progress Note    Psychotherapy Provided: Individual Psychotherapy     1. MDD (major depressive disorder), recurrent episode, moderate ([address])            Goals addressed in session: Goal 1     DATA: Met with Gonzalo Pevaughn. Session began with open ended questions to gain feedback on mood. She expressed positive spirits and that she has been feeling "much better." She reported that she quit her job and this has been a big stress relief. She expressed that she now has time to do things that she enjoys, such as yard work and crafts. She expressed that work was a stressor due to responsibilities and feeling like she was taken advantage of because she was unable to say "no" to others. She reported she blocked he coworkers because she felt if she did not she would go back to working that position. Reviewed her goals. Session ended with focusing on self-esteem building and communication issues that she faces. During this session, this clinician used the following therapeutic modalities: Cognitive Behavioral Therapy, Motivational Interviewing and Supportive Psychotherapy    Substance Abuse was not addressed during this session. If the client is diagnosed with a co-occurring substance use disorder, please indicate any changes in the frequency or amount of use: n/a. Stage of change for addressing substance use diagnoses: No substance use/Not applicable    ASSESSMENT:  Radha Wallace presents with a Euthymic/ normal mood. her affect is Normal range and intensity, which is congruent, with her mood and the content of the session. The client has made progress on their goals. Radha Wallace presents with a none risk of suicide, none risk of self-harm, and none risk of harm to others. For any risk assessment that surpasses a "low" rating, a safety plan must be developed.     A safety plan was indicated: no  If yes, describe in detail n/a    PLAN: Between sessions, Radha Wallace will use her natural supports and coping skills. At the next session, the therapist will use Cognitive Behavioral Therapy, Mindfulness-based Strategies and Supportive Psychotherapy to address effective coping and self esteem building . Behavioral Health Treatment Plan and Discharge Planning: Wili Han is aware of and agrees to continue to work on their treatment plan. They have identified and are working toward their discharge goals.  yes    Visit start and stop times:    09/12/23  Start Time: 1700  Stop Time: 1735  Total Visit Time: 35 minutes

## 2023-10-02 DIAGNOSIS — I73.00 RAYNAUD'S DISEASE WITHOUT GANGRENE: ICD-10-CM

## 2023-10-02 RX ORDER — AMLODIPINE BESYLATE 10 MG/1
5 TABLET ORAL DAILY
Qty: 45 TABLET | Refills: 0 | Status: SHIPPED | OUTPATIENT
Start: 2023-10-02

## 2023-10-04 ENCOUNTER — OFFICE VISIT (OUTPATIENT)
Dept: PSYCHIATRY | Facility: CLINIC | Age: 48
End: 2023-10-04
Payer: COMMERCIAL

## 2023-10-04 DIAGNOSIS — F43.10 PTSD (POST-TRAUMATIC STRESS DISORDER): ICD-10-CM

## 2023-10-04 DIAGNOSIS — F41.1 GENERALIZED ANXIETY DISORDER: ICD-10-CM

## 2023-10-04 DIAGNOSIS — F33.1 MODERATE EPISODE OF RECURRENT MAJOR DEPRESSIVE DISORDER (HCC): Primary | ICD-10-CM

## 2023-10-04 PROCEDURE — 99213 OFFICE O/P EST LOW 20 MIN: CPT | Performed by: PHYSICIAN ASSISTANT
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
## 2023-10-18 DIAGNOSIS — E66.01 CLASS 3 SEVERE OBESITY DUE TO EXCESS CALORIES WITHOUT SERIOUS COMORBIDITY WITH BODY MASS INDEX (BMI) OF 40.0 TO 44.9 IN ADULT (HCC): ICD-10-CM

## 2023-10-18 RX ORDER — PHENTERMINE HYDROCHLORIDE 37.5 MG/1
37.5 CAPSULE ORAL EVERY MORNING
Qty: 30 CAPSULE | Refills: 0 | Status: SHIPPED | OUTPATIENT
Start: 2023-10-18

## 2023-11-13 DIAGNOSIS — I49.3 PVC (PREMATURE VENTRICULAR CONTRACTION): ICD-10-CM

## 2023-11-13 RX ORDER — METOPROLOL SUCCINATE 50 MG/1
TABLET, EXTENDED RELEASE ORAL
Qty: 90 TABLET | Refills: 0 | Status: SHIPPED | OUTPATIENT
Start: 2023-11-13

## 2023-11-15 DIAGNOSIS — I73.00 RAYNAUD'S DISEASE WITHOUT GANGRENE: ICD-10-CM

## 2023-11-15 DIAGNOSIS — E66.01 CLASS 3 SEVERE OBESITY DUE TO EXCESS CALORIES WITHOUT SERIOUS COMORBIDITY WITH BODY MASS INDEX (BMI) OF 40.0 TO 44.9 IN ADULT (HCC): ICD-10-CM

## 2023-11-15 RX ORDER — PHENTERMINE HYDROCHLORIDE 37.5 MG/1
37.5 CAPSULE ORAL EVERY MORNING
Qty: 30 CAPSULE | Refills: 0 | Status: SHIPPED | OUTPATIENT
Start: 2023-11-15

## 2023-11-15 RX ORDER — AMLODIPINE BESYLATE 10 MG/1
5 TABLET ORAL DAILY
Qty: 45 TABLET | Refills: 0 | Status: SHIPPED | OUTPATIENT
Start: 2023-11-15

## 2023-12-04 ENCOUNTER — HOSPITAL ENCOUNTER (EMERGENCY)
Facility: HOSPITAL | Age: 48
Discharge: HOME | End: 2023-12-04
Attending: EMERGENCY MEDICINE
Payer: COMMERCIAL

## 2023-12-04 ENCOUNTER — APPOINTMENT (EMERGENCY)
Dept: CT IMAGING | Facility: HOSPITAL | Age: 48
End: 2023-12-04
Payer: COMMERCIAL

## 2023-12-04 VITALS
HEART RATE: 68 BPM | RESPIRATION RATE: 18 BRPM | TEMPERATURE: 97.9 F | SYSTOLIC BLOOD PRESSURE: 172 MMHG | OXYGEN SATURATION: 98 % | DIASTOLIC BLOOD PRESSURE: 93 MMHG

## 2023-12-04 DIAGNOSIS — R10.9 ABDOMINAL PAIN: Primary | ICD-10-CM

## 2023-12-04 DIAGNOSIS — K92.1 HEMATOCHEZIA: ICD-10-CM

## 2023-12-04 DIAGNOSIS — F33.1 MODERATE EPISODE OF RECURRENT MAJOR DEPRESSIVE DISORDER (HCC): ICD-10-CM

## 2023-12-04 DIAGNOSIS — K52.9 GASTROENTERITIS: ICD-10-CM

## 2023-12-04 DIAGNOSIS — F43.10 PTSD (POST-TRAUMATIC STRESS DISORDER): ICD-10-CM

## 2023-12-04 DIAGNOSIS — D72.829 LEUKOCYTOSIS: ICD-10-CM

## 2023-12-04 LAB
ALBUMIN SERPL BCP-MCNC: 4.8 G/DL (ref 3.5–5)
ALP SERPL-CCNC: 97 U/L (ref 34–104)
ALT SERPL W P-5'-P-CCNC: 16 U/L (ref 7–52)
ANION GAP SERPL CALCULATED.3IONS-SCNC: 10 MMOL/L
APTT PPP: 27 SECONDS (ref 23–37)
AST SERPL W P-5'-P-CCNC: 15 U/L (ref 13–39)
ATRIAL RATE: 61 BPM
BASOPHILS # BLD MANUAL: 0 THOUSAND/UL (ref 0–0.1)
BASOPHILS NFR MAR MANUAL: 0 % (ref 0–1)
BILIRUB SERPL-MCNC: 0.82 MG/DL (ref 0.2–1)
BUN SERPL-MCNC: 11 MG/DL (ref 5–25)
CALCIUM SERPL-MCNC: 9.2 MG/DL (ref 8.4–10.2)
CHLORIDE SERPL-SCNC: 101 MMOL/L (ref 96–108)
CO2 SERPL-SCNC: 24 MMOL/L (ref 21–32)
CREAT SERPL-MCNC: 0.62 MG/DL (ref 0.6–1.3)
EOSINOPHIL # BLD MANUAL: 0 THOUSAND/UL (ref 0–0.4)
EOSINOPHIL NFR BLD MANUAL: 0 % (ref 0–6)
ERYTHROCYTE [DISTWIDTH] IN BLOOD BY AUTOMATED COUNT: 13.3 % (ref 11.6–15.1)
GFR SERPL CREATININE-BSD FRML MDRD: 106 ML/MIN/1.73SQ M
GIANT PLATELETS BLD QL SMEAR: PRESENT
GLUCOSE SERPL-MCNC: 135 MG/DL (ref 65–140)
HCT VFR BLD AUTO: 49 % (ref 34.8–46.1)
HGB BLD-MCNC: 16.4 G/DL (ref 11.5–15.4)
INR PPP: 1 (ref 0.84–1.19)
LACTATE SERPL-SCNC: 1.5 MMOL/L (ref 0.5–2)
LIPASE SERPL-CCNC: 28 U/L (ref 11–82)
LYMPHOCYTES # BLD AUTO: 1.59 THOUSAND/UL (ref 0.6–4.47)
LYMPHOCYTES # BLD AUTO: 6 % (ref 14–44)
MCH RBC QN AUTO: 32.1 PG (ref 26.8–34.3)
MCHC RBC AUTO-ENTMCNC: 33.5 G/DL (ref 31.4–37.4)
MCV RBC AUTO: 96 FL (ref 82–98)
MONOCYTES # BLD AUTO: 0.53 THOUSAND/UL (ref 0–1.22)
MONOCYTES NFR BLD: 2 % (ref 4–12)
NEUTROPHILS # BLD MANUAL: 24.32 THOUSAND/UL (ref 1.85–7.62)
NEUTS SEG NFR BLD AUTO: 92 % (ref 43–75)
P AXIS: 78 DEGREES
PLATELET # BLD AUTO: 408 THOUSANDS/UL (ref 149–390)
PLATELET BLD QL SMEAR: ABNORMAL
PMV BLD AUTO: 9.9 FL (ref 8.9–12.7)
POTASSIUM SERPL-SCNC: 3.7 MMOL/L (ref 3.5–5.3)
PR INTERVAL: 176 MS
PROCALCITONIN SERPL-MCNC: 0.06 NG/ML
PROT SERPL-MCNC: 7.6 G/DL (ref 6.4–8.4)
PROTHROMBIN TIME: 13.3 SECONDS (ref 11.6–14.5)
QRS AXIS: 88 DEGREES
QRSD INTERVAL: 82 MS
QT INTERVAL: 562 MS
QTC INTERVAL: 565 MS
RBC # BLD AUTO: 5.11 MILLION/UL (ref 3.81–5.12)
RBC MORPH BLD: NORMAL
SODIUM SERPL-SCNC: 135 MMOL/L (ref 135–147)
T WAVE AXIS: 106 DEGREES
VENTRICULAR RATE: 61 BPM
WBC # BLD AUTO: 26.43 THOUSAND/UL (ref 4.31–10.16)

## 2023-12-04 PROCEDURE — 85730 THROMBOPLASTIN TIME PARTIAL: CPT | Performed by: EMERGENCY MEDICINE

## 2023-12-04 PROCEDURE — 83690 ASSAY OF LIPASE: CPT | Performed by: EMERGENCY MEDICINE

## 2023-12-04 PROCEDURE — 87154 CUL TYP ID BLD PTHGN 6+ TRGT: CPT | Performed by: EMERGENCY MEDICINE

## 2023-12-04 PROCEDURE — 80053 COMPREHEN METABOLIC PANEL: CPT | Performed by: EMERGENCY MEDICINE

## 2023-12-04 PROCEDURE — 96375 TX/PRO/DX INJ NEW DRUG ADDON: CPT

## 2023-12-04 PROCEDURE — 99285 EMERGENCY DEPT VISIT HI MDM: CPT

## 2023-12-04 PROCEDURE — 36415 COLL VENOUS BLD VENIPUNCTURE: CPT | Performed by: EMERGENCY MEDICINE

## 2023-12-04 PROCEDURE — 74177 CT ABD & PELVIS W/CONTRAST: CPT

## 2023-12-04 PROCEDURE — 85007 BL SMEAR W/DIFF WBC COUNT: CPT | Performed by: EMERGENCY MEDICINE

## 2023-12-04 PROCEDURE — 96361 HYDRATE IV INFUSION ADD-ON: CPT

## 2023-12-04 PROCEDURE — 84145 PROCALCITONIN (PCT): CPT | Performed by: EMERGENCY MEDICINE

## 2023-12-04 PROCEDURE — 93005 ELECTROCARDIOGRAM TRACING: CPT

## 2023-12-04 PROCEDURE — 85610 PROTHROMBIN TIME: CPT | Performed by: EMERGENCY MEDICINE

## 2023-12-04 PROCEDURE — 83605 ASSAY OF LACTIC ACID: CPT | Performed by: EMERGENCY MEDICINE

## 2023-12-04 PROCEDURE — 87040 BLOOD CULTURE FOR BACTERIA: CPT | Performed by: EMERGENCY MEDICINE

## 2023-12-04 PROCEDURE — G1004 CDSM NDSC: HCPCS

## 2023-12-04 PROCEDURE — 96365 THER/PROPH/DIAG IV INF INIT: CPT

## 2023-12-04 PROCEDURE — 85027 COMPLETE CBC AUTOMATED: CPT | Performed by: EMERGENCY MEDICINE

## 2023-12-04 RX ORDER — OXYCODONE HYDROCHLORIDE AND ACETAMINOPHEN 5; 325 MG/1; MG/1
1 TABLET ORAL EVERY 4 HOURS PRN
Qty: 10 TABLET | Refills: 0 | Status: SHIPPED | OUTPATIENT
Start: 2023-12-04

## 2023-12-04 RX ORDER — HYDROMORPHONE HCL/PF 1 MG/ML
1 SYRINGE (ML) INJECTION ONCE
Status: COMPLETED | OUTPATIENT
Start: 2023-12-04 | End: 2023-12-04

## 2023-12-04 RX ORDER — CIPROFLOXACIN 500 MG/1
500 TABLET, FILM COATED ORAL ONCE
Status: COMPLETED | OUTPATIENT
Start: 2023-12-04 | End: 2023-12-04

## 2023-12-04 RX ORDER — ONDANSETRON 2 MG/ML
4 INJECTION INTRAMUSCULAR; INTRAVENOUS ONCE
Status: COMPLETED | OUTPATIENT
Start: 2023-12-04 | End: 2023-12-04

## 2023-12-04 RX ORDER — CIPROFLOXACIN 500 MG/1
500 TABLET, FILM COATED ORAL 2 TIMES DAILY
Qty: 10 TABLET | Refills: 0 | Status: SHIPPED | OUTPATIENT
Start: 2023-12-04 | End: 2023-12-09

## 2023-12-04 RX ORDER — ONDANSETRON 4 MG/1
4 TABLET, ORALLY DISINTEGRATING ORAL EVERY 6 HOURS PRN
Qty: 12 TABLET | Refills: 0 | Status: SHIPPED | OUTPATIENT
Start: 2023-12-04

## 2023-12-04 RX ORDER — CEFTRIAXONE 1 G/50ML
1000 INJECTION, SOLUTION INTRAVENOUS ONCE
Status: COMPLETED | OUTPATIENT
Start: 2023-12-04 | End: 2023-12-04

## 2023-12-04 RX ADMIN — SODIUM CHLORIDE 1000 ML: 0.9 INJECTION, SOLUTION INTRAVENOUS at 02:09

## 2023-12-04 RX ADMIN — IOHEXOL 100 ML: 350 INJECTION, SOLUTION INTRAVENOUS at 02:53

## 2023-12-04 RX ADMIN — CEFTRIAXONE 1000 MG: 1 INJECTION, SOLUTION INTRAVENOUS at 03:15

## 2023-12-04 RX ADMIN — ONDANSETRON 4 MG: 2 INJECTION INTRAMUSCULAR; INTRAVENOUS at 02:10

## 2023-12-04 RX ADMIN — HYDROMORPHONE HYDROCHLORIDE 1 MG: 1 INJECTION, SOLUTION INTRAMUSCULAR; INTRAVENOUS; SUBCUTANEOUS at 02:10

## 2023-12-04 RX ADMIN — CIPROFLOXACIN 500 MG: 500 TABLET, FILM COATED ORAL at 04:32

## 2023-12-04 RX ADMIN — SODIUM CHLORIDE 1000 ML: 0.9 INJECTION, SOLUTION INTRAVENOUS at 03:11

## 2023-12-04 NOTE — DISCHARGE INSTRUCTIONS
RETURN IF WORSE IN ANY WAY:   WORSENING PAIN,   FEVER OR FLU LIKE SYMPTOMS,   OR NEW AND CONCERNING SYMPTOMS SIGNS OR SYMPTOMS      PLEASE CALL YOUR PRIMARY DOCTOR IN THE MORNING TO SET UP FOLLOW UP for TOMORROW or the Next day  PLEASE REVIEW THE WORK UP RESULTS WITH YOUR DOCTOR  Please continue to drink plenty of fluids.   You can take Zofran for nausea or vomiting

## 2023-12-05 ENCOUNTER — VBI (OUTPATIENT)
Dept: INTERNAL MEDICINE CLINIC | Facility: CLINIC | Age: 48
End: 2023-12-05

## 2023-12-05 ENCOUNTER — TELEPHONE (OUTPATIENT)
Dept: PSYCHIATRY | Facility: CLINIC | Age: 48
End: 2023-12-05

## 2023-12-05 RX ORDER — QUETIAPINE FUMARATE 25 MG/1
25 TABLET, FILM COATED ORAL
Qty: 90 TABLET | Refills: 0 | Status: SHIPPED | OUTPATIENT
Start: 2023-12-05

## 2023-12-05 RX ORDER — ESCITALOPRAM OXALATE 10 MG/1
10 TABLET ORAL DAILY
Qty: 90 TABLET | Refills: 0 | Status: SHIPPED | OUTPATIENT
Start: 2023-12-05

## 2023-12-05 NOTE — TELEPHONE ENCOUNTER
Patient is calling regarding cancelling an appointment. Date/Time: 12/5/23 at 4:00p    Reason: patient is not feeling well    Patient was rescheduled: YES [] NO [x]  If yes, when was Patient reschedule for:     Patient requesting call back to reschedule: YES [] NO [x]    Patient stated she will call back tomorrow when she is feeling better to reschedule.

## 2023-12-05 NOTE — TELEPHONE ENCOUNTER
12/05/23 2:54 PM    Patient contacted post ED visit, outreach attempt made but message could not be left. Additional outreach attempt will be made. Thank you.   Isabel Laguerre MA  [facility]

## 2023-12-06 NOTE — TELEPHONE ENCOUNTER
12/06/23 2:24 PM    Patient contacted post ED visit, [facility] department spoke with patient/caregiver and outreach was successful. Thank you.   Penny Hernandez MA  [facility]

## 2023-12-08 ENCOUNTER — OFFICE VISIT (OUTPATIENT)
Dept: INTERNAL MEDICINE CLINIC | Facility: CLINIC | Age: 48
End: 2023-12-08
Payer: COMMERCIAL

## 2023-12-08 VITALS
BODY MASS INDEX: 27.53 KG/M2 | OXYGEN SATURATION: 95 % | WEIGHT: 149.6 LBS | TEMPERATURE: 97.5 F | HEIGHT: 62 IN | SYSTOLIC BLOOD PRESSURE: 104 MMHG | DIASTOLIC BLOOD PRESSURE: 60 MMHG | HEART RATE: 91 BPM

## 2023-12-08 DIAGNOSIS — Z11.4 SCREENING FOR HIV (HUMAN IMMUNODEFICIENCY VIRUS): ICD-10-CM

## 2023-12-08 DIAGNOSIS — Z11.59 NEED FOR HEPATITIS C SCREENING TEST: ICD-10-CM

## 2023-12-08 DIAGNOSIS — K82.4 GALLBLADDER POLYP: ICD-10-CM

## 2023-12-08 DIAGNOSIS — K52.9 COLITIS: Primary | ICD-10-CM

## 2023-12-08 DIAGNOSIS — Z23 ENCOUNTER FOR IMMUNIZATION: ICD-10-CM

## 2023-12-08 LAB
BACTERIA BLD CULT: ABNORMAL
GRAM STN SPEC: ABNORMAL
S AUREUS+CONS DNA BLD POS NAA+NON-PROBE: DETECTED

## 2023-12-08 PROCEDURE — 90686 IIV4 VACC NO PRSV 0.5 ML IM: CPT

## 2023-12-08 PROCEDURE — 90677 PCV20 VACCINE IM: CPT

## 2023-12-08 PROCEDURE — 90471 IMMUNIZATION ADMIN: CPT

## 2023-12-08 PROCEDURE — 90715 TDAP VACCINE 7 YRS/> IM: CPT

## 2023-12-08 PROCEDURE — 90472 IMMUNIZATION ADMIN EACH ADD: CPT

## 2023-12-08 PROCEDURE — 99214 OFFICE O/P EST MOD 30 MIN: CPT | Performed by: PHYSICIAN ASSISTANT

## 2023-12-09 LAB — BACTERIA BLD CULT: NORMAL

## 2023-12-11 PROBLEM — K82.4 GALLBLADDER POLYP: Status: ACTIVE | Noted: 2023-12-11

## 2023-12-11 PROBLEM — K52.9 COLITIS: Status: ACTIVE | Noted: 2023-12-11

## 2023-12-11 NOTE — PROGRESS NOTES
Name: Tiana Nunez      : 1975      MRN: [id number]  Encounter Provider: Mike Saravia PA-C  Encounter Date: 2023   Encounter department: [address]     1. Colitis  Assessment & Plan: Will recheck cbc to ensure wbc has improved. Pt has completed cipro. Will order colonoscopy. Orders:  -     CBC and differential; Future  -     Ambulatory Referral to Gastroenterology; Future    2. Need for hepatitis C screening test    3. Encounter for immunization  -     Pneumococcal Conjugate Vaccine 20-valent (PCV20)  -     TDAP VACCINE GREATER THAN OR EQUAL TO 8YO IM  -     influenza vaccine, quadrivalent, 0.5 mL, preservative-free, for adult and pediatric patients 6 mos+ (AFLURIA, FLUARIX, FLULAVAL, FLUZONE)    4. Screening for HIV (human immunodeficiency virus)    5. Gallbladder polyp  -     Ambulatory Referral to General Surgery; Future        Tobacco Cessation Counseling: Tobacco cessation counseling was not provided. The patient is sincerely urged to quit consumption of tobacco. She is not ready to quit tobacco.         Subjective      Pt presents for ER follow up. She was evaluated due to intense abdominal pain, nausea, vomiting and blood in stool. She had CT which revealed colitis. She also has a known gallbladder polyp. She had labs which revealed elevated wbc count, higher than her baseline. Blood cultures were half positive indicating contamination. Pt was given cipro. Symptoms are steadily improving. Review of Systems   Constitutional:  Negative for chills and fever. HENT:  Negative for congestion, ear pain, hearing loss, postnasal drip, rhinorrhea, sinus pressure, sinus pain, sore throat and trouble swallowing. Eyes:  Negative for pain and visual disturbance. Respiratory:  Negative for cough, chest tightness, shortness of breath and wheezing. Cardiovascular: Negative. Negative for chest pain, palpitations and leg swelling. Gastrointestinal:  Positive for abdominal pain, blood in stool, diarrhea, nausea and vomiting. Negative for constipation. Endocrine: Negative for cold intolerance, heat intolerance, polydipsia, polyphagia and polyuria. Genitourinary:  Negative for difficulty urinating, dysuria, flank pain and urgency. Musculoskeletal:  Negative for arthralgias, back pain, gait problem and myalgias. Skin:  Negative for rash. Allergic/Immunologic: Negative. Neurological:  Negative for dizziness, weakness, light-headedness and headaches. Hematological: Negative. Psychiatric/Behavioral:  Negative for behavioral problems, dysphoric mood and sleep disturbance. The patient is not nervous/anxious. Current Outpatient Medications on File Prior to Visit   Medication Sig   • albuterol (Ventolin HFA) 90 mcg/act inhaler Inhale 2 puffs every 4 (four) hours as needed for wheezing   • amLODIPine (NORVASC) 10 mg tablet Take 0.5 tablets (5 mg total) by mouth daily   • atorvastatin (LIPITOR) 20 mg tablet TAKE 1 TABLET BY MOUTH EVERY DAY   • Breo Ellipta 100-25 MCG/INH inhaler INHALE 1 PUFF DAILY RINSE MOUTH AFTER USE.    • ergocalciferol (VITAMIN D2) 50,000 units TAKE 1 CAPSULE BY MOUTH ONE TIME PER WEEK   • escitalopram (LEXAPRO) 10 mg tablet TAKE 1 TABLET BY MOUTH EVERY DAY   • ipratropium-albuterol (DUO-NEB) 0.5-2.5 mg/3 mL nebulizer solution Take 3 mL by nebulization 4 (four) times a day   • metoprolol succinate (TOPROL-XL) 50 mg 24 hr tablet TAKE 1 TABLET BY MOUTH EVERY DAY   • NON FORMULARY MEDICAL MARIJUANA   • ondansetron (Zofran ODT) 4 mg disintegrating tablet Take 1 tablet (4 mg total) by mouth every 6 (six) hours as needed for nausea or vomiting for up to 12 doses   • oxyCODONE-acetaminophen (Percocet) 5-325 mg per tablet Take 1 tablet by mouth every 4 (four) hours as needed for moderate pain for up to 10 doses Max Daily Amount: 6 tablets   • QUEtiapine (SEROquel) 25 mg tablet TAKE 1 TABLET BY MOUTH EVERYDAY AT BEDTIME   • phentermine 37.5 MG capsule Take 1 capsule (37.5 mg total) by mouth every morning (Patient not taking: Reported on 12/8/2023)       Objective     /60   Pulse 91   Temp 97.5 °F (36.4 °C) (Tympanic)   Ht 5' 2" (1.575 m)   Wt 67.9 kg (149 lb 9.6 oz)   SpO2 95%   BMI 27.36 kg/m²     Physical Exam  Vitals and nursing note reviewed. Constitutional:       General: She is not in acute distress. Appearance: Normal appearance. She is well-developed. She is not diaphoretic. HENT:      Head: Normocephalic and atraumatic. Right Ear: External ear normal.      Left Ear: External ear normal.      Nose: Nose normal.      Mouth/Throat:      Pharynx: No oropharyngeal exudate. Eyes:      General: No scleral icterus. Right eye: No discharge. Left eye: No discharge. Conjunctiva/sclera: Conjunctivae normal.      Pupils: Pupils are equal, round, and reactive to light. Neck:      Thyroid: No thyromegaly. Cardiovascular:      Rate and Rhythm: Normal rate and regular rhythm. Heart sounds: Normal heart sounds. No murmur heard. No friction rub. No gallop. Pulmonary:      Effort: Pulmonary effort is normal. No respiratory distress. Breath sounds: Normal breath sounds. No wheezing or rales. Abdominal:      General: Bowel sounds are normal. There is no distension. Palpations: Abdomen is soft. Tenderness: There is no abdominal tenderness. Musculoskeletal:         General: No tenderness or deformity. Normal range of motion. Cervical back: Normal range of motion and neck supple. Skin:     General: Skin is warm and dry. Neurological:      Mental Status: She is alert and oriented to person, place, and time. Cranial Nerves: No cranial nerve deficit. Psychiatric:         Behavior: Behavior normal.         Thought Content:  Thought content normal.         Judgment: Judgment normal.       Eden Coker PA-C

## 2023-12-18 ENCOUNTER — APPOINTMENT (OUTPATIENT)
Dept: LAB | Facility: CLINIC | Age: 48
End: 2023-12-18
Payer: COMMERCIAL

## 2023-12-18 DIAGNOSIS — K52.9 COLITIS: ICD-10-CM

## 2023-12-18 LAB
BASOPHILS # BLD AUTO: 0.06 THOUSANDS/ÂΜL (ref 0–0.1)
BASOPHILS NFR BLD AUTO: 0 % (ref 0–1)
EOSINOPHIL # BLD AUTO: 0.16 THOUSAND/ÂΜL (ref 0–0.61)
EOSINOPHIL NFR BLD AUTO: 1 % (ref 0–6)
ERYTHROCYTE [DISTWIDTH] IN BLOOD BY AUTOMATED COUNT: 13.9 % (ref 11.6–15.1)
HCT VFR BLD AUTO: 44.2 % (ref 34.8–46.1)
HGB BLD-MCNC: 14.8 G/DL (ref 11.5–15.4)
IMM GRANULOCYTES # BLD AUTO: 0.07 THOUSAND/UL (ref 0–0.2)
IMM GRANULOCYTES NFR BLD AUTO: 1 % (ref 0–2)
LYMPHOCYTES # BLD AUTO: 4.32 THOUSANDS/ÂΜL (ref 0.6–4.47)
LYMPHOCYTES NFR BLD AUTO: 31 % (ref 14–44)
MCH RBC QN AUTO: 32.6 PG (ref 26.8–34.3)
MCHC RBC AUTO-ENTMCNC: 33.5 G/DL (ref 31.4–37.4)
MCV RBC AUTO: 97 FL (ref 82–98)
MONOCYTES # BLD AUTO: 0.82 THOUSAND/ÂΜL (ref 0.17–1.22)
MONOCYTES NFR BLD AUTO: 6 % (ref 4–12)
NEUTROPHILS # BLD AUTO: 8.52 THOUSANDS/ÂΜL (ref 1.85–7.62)
NEUTS SEG NFR BLD AUTO: 61 % (ref 43–75)
NRBC BLD AUTO-RTO: 0 /100 WBCS
PLATELET # BLD AUTO: 533 THOUSANDS/UL (ref 149–390)
PMV BLD AUTO: 9.1 FL (ref 8.9–12.7)
RBC # BLD AUTO: 4.54 MILLION/UL (ref 3.81–5.12)
WBC # BLD AUTO: 13.95 THOUSAND/UL (ref 4.31–10.16)

## 2023-12-18 PROCEDURE — 36415 COLL VENOUS BLD VENIPUNCTURE: CPT

## 2023-12-18 PROCEDURE — 85025 COMPLETE CBC W/AUTO DIFF WBC: CPT

## 2023-12-27 ENCOUNTER — OFFICE VISIT (OUTPATIENT)
Dept: INTERNAL MEDICINE CLINIC | Facility: CLINIC | Age: 48
End: 2023-12-27
Payer: COMMERCIAL

## 2023-12-27 VITALS
WEIGHT: 152.4 LBS | OXYGEN SATURATION: 98 % | HEART RATE: 80 BPM | TEMPERATURE: 97.3 F | HEIGHT: 62 IN | SYSTOLIC BLOOD PRESSURE: 122 MMHG | DIASTOLIC BLOOD PRESSURE: 68 MMHG | BODY MASS INDEX: 28.05 KG/M2

## 2023-12-27 DIAGNOSIS — Z11.4 SCREENING FOR HIV (HUMAN IMMUNODEFICIENCY VIRUS): ICD-10-CM

## 2023-12-27 DIAGNOSIS — E66.01 CLASS 3 SEVERE OBESITY DUE TO EXCESS CALORIES WITHOUT SERIOUS COMORBIDITY WITH BODY MASS INDEX (BMI) OF 40.0 TO 44.9 IN ADULT (HCC): ICD-10-CM

## 2023-12-27 DIAGNOSIS — I10 BENIGN ESSENTIAL HYPERTENSION: ICD-10-CM

## 2023-12-27 DIAGNOSIS — D75.839 THROMBOCYTOSIS: ICD-10-CM

## 2023-12-27 DIAGNOSIS — D72.829 LEUKOCYTOSIS, UNSPECIFIED TYPE: Primary | ICD-10-CM

## 2023-12-27 DIAGNOSIS — Z11.59 NEED FOR HEPATITIS C SCREENING TEST: ICD-10-CM

## 2023-12-27 PROCEDURE — 99214 OFFICE O/P EST MOD 30 MIN: CPT | Performed by: PHYSICIAN ASSISTANT

## 2023-12-27 RX ORDER — PHENTERMINE HYDROCHLORIDE 37.5 MG/1
37.5 CAPSULE ORAL EVERY MORNING
Qty: 30 CAPSULE | Refills: 0 | Status: SHIPPED | OUTPATIENT
Start: 2023-12-27

## 2024-01-02 PROBLEM — D75.839 THROMBOCYTOSIS: Status: ACTIVE | Noted: 2024-01-02

## 2024-01-02 PROBLEM — D72.829 LEUKOCYTOSIS: Status: ACTIVE | Noted: 2024-01-02

## 2024-01-02 NOTE — PROGRESS NOTES
Name: Mikala Simmons      : 1975      MRN: [id number]  Encounter Provider: Eden Coker PA-C  Encounter Date: 2023   Encounter department: [facility]    Assessment & Plan     1. Leukocytosis, unspecified type  Assessment & Plan:  Chronic for years, recently exacerbated with colitis. Will continue to monitor. Pt had workup with hematology previously.       2. Need for hepatitis C screening test  -     Hepatitis C Antibody; Future    3. Screening for HIV (human immunodeficiency virus)  -     HIV 1/2 AG/AB w Reflex [facility] for 2 yr old and above; Future    4. Class 3 severe obesity due to excess calories without serious comorbidity with body mass index (BMI) of 40.0 to 44.9 in adult (HCC)  -     phentermine 37.5 MG capsule; Take 1 capsule (37.5 mg total) by mouth every morning    5. Thrombocytosis  -     CBC and differential; Future    6. Benign essential hypertension  Assessment & Plan:  Pts symptoms are stable with current regime. No changes at present.             Depression Screening and Follow-up Plan: Patient's depression screening was positive with a PHQ-9 score of 5. Patient assessed for underlying major depression. Brief counseling provided and recommend additional follow-up/re-evaluation next office visit.     Tobacco Cessation Counseling: Tobacco cessation counseling was not provided. The patient is sincerely urged to quit consumption of tobacco. She is not ready to quit tobacco.         Subjective      Pt presents for routine visit. She is up to date with mammogram and CRC Screening. She is noting improved abdominal pain. Repeat labs revealed improved wbc but ongoing increased platelets. She had been seen by hematology previously. Will continue to trend.       Review of Systems   Constitutional:  Negative for chills and fever.   HENT:  Negative for congestion, ear pain, hearing loss, postnasal drip, rhinorrhea, sinus pressure, sinus pain, sore throat and trouble  swallowing.    Eyes:  Negative for pain and visual disturbance.   Respiratory:  Negative for cough, chest tightness, shortness of breath and wheezing.    Cardiovascular: Negative.  Negative for chest pain, palpitations and leg swelling.   Gastrointestinal:  Negative for abdominal pain, blood in stool, constipation, diarrhea, nausea and vomiting.   Endocrine: Negative for cold intolerance, heat intolerance, polydipsia, polyphagia and polyuria.   Genitourinary:  Negative for difficulty urinating, dysuria, flank pain and urgency.   Musculoskeletal:  Negative for arthralgias, back pain, gait problem and myalgias.   Skin:  Negative for rash.   Allergic/Immunologic: Negative.    Neurological:  Negative for dizziness, weakness, light-headedness and headaches.   Hematological: Negative.    Psychiatric/Behavioral:  Negative for behavioral problems, dysphoric mood and sleep disturbance. The patient is not nervous/anxious.        Current Outpatient Medications on File Prior to Visit   Medication Sig    albuterol (Ventolin HFA) 90 mcg/act inhaler Inhale 2 puffs every 4 (four) hours as needed for wheezing    amLODIPine (NORVASC) 10 mg tablet Take 0.5 tablets (5 mg total) by mouth daily    atorvastatin (LIPITOR) 20 mg tablet TAKE 1 TABLET BY MOUTH EVERY DAY    Breo Ellipta 100-25 MCG/INH inhaler INHALE 1 PUFF DAILY RINSE MOUTH AFTER USE.    ergocalciferol (VITAMIN D2) 50,000 units TAKE 1 CAPSULE BY MOUTH ONE TIME PER WEEK    escitalopram (LEXAPRO) 10 mg tablet TAKE 1 TABLET BY MOUTH EVERY DAY    ipratropium-albuterol (DUO-NEB) 0.5-2.5 mg/3 mL nebulizer solution Take 3 mL by nebulization 4 (four) times a day    metoprolol succinate (TOPROL-XL) 50 mg 24 hr tablet TAKE 1 TABLET BY MOUTH EVERY DAY    NON FORMULARY MEDICAL MARIJUANA    ondansetron (Zofran ODT) 4 mg disintegrating tablet Take 1 tablet (4 mg total) by mouth every 6 (six) hours as needed for nausea or vomiting for up to 12 doses    QUEtiapine (SEROquel) 25 mg tablet TAKE  "1 TABLET BY MOUTH EVERYDAY AT BEDTIME    oxyCODONE-acetaminophen (Percocet) 5-325 mg per tablet Take 1 tablet by mouth every 4 (four) hours as needed for moderate pain for up to 10 doses Max Daily Amount: 6 tablets (Patient not taking: Reported on 12/27/2023)       Objective     /68   Pulse 80   Temp (!) 97.3 °F (36.3 °C) (Tympanic)   Ht 5' 2\" (1.575 m)   Wt 69.1 kg (152 lb 6.4 oz)   SpO2 98%   BMI 27.87 kg/m²     Physical Exam  Vitals and nursing note reviewed.   Constitutional:       General: She is not in acute distress.     Appearance: Normal appearance. She is well-developed. She is not diaphoretic.   HENT:      Head: Normocephalic and atraumatic.      Right Ear: External ear normal.      Left Ear: External ear normal.      Nose: Nose normal.      Mouth/Throat:      Pharynx: No oropharyngeal exudate.   Eyes:      General: No scleral icterus.        Right eye: No discharge.         Left eye: No discharge.      Conjunctiva/sclera: Conjunctivae normal.      Pupils: Pupils are equal, round, and reactive to light.   Neck:      Thyroid: No thyromegaly.   Cardiovascular:      Rate and Rhythm: Normal rate and regular rhythm.      Heart sounds: Normal heart sounds. No murmur heard.     No friction rub. No gallop.   Pulmonary:      Effort: Pulmonary effort is normal. No respiratory distress.      Breath sounds: Normal breath sounds. No wheezing or rales.   Abdominal:      General: Bowel sounds are normal. There is no distension.      Palpations: Abdomen is soft.      Tenderness: There is no abdominal tenderness.   Musculoskeletal:         General: No tenderness or deformity. Normal range of motion.      Cervical back: Normal range of motion and neck supple.   Skin:     General: Skin is warm and dry.   Neurological:      Mental Status: She is alert and oriented to person, place, and time.      Cranial Nerves: No cranial nerve deficit.   Psychiatric:         Behavior: Behavior normal.         Thought Content: " Thought content normal.         Judgment: Judgment normal.       Eden Coker PA-C

## 2024-01-02 NOTE — ASSESSMENT & PLAN NOTE
Chronic for years, recently exacerbated with colitis. Will continue to monitor. Pt had workup with hematology previously.

## 2024-01-05 ENCOUNTER — TELEMEDICINE (OUTPATIENT)
Dept: PSYCHIATRY | Facility: CLINIC | Age: 49
End: 2024-01-05
Payer: COMMERCIAL

## 2024-01-05 DIAGNOSIS — F43.10 PTSD (POST-TRAUMATIC STRESS DISORDER): ICD-10-CM

## 2024-01-05 DIAGNOSIS — F41.1 GENERALIZED ANXIETY DISORDER: ICD-10-CM

## 2024-01-05 DIAGNOSIS — F33.1 MODERATE EPISODE OF RECURRENT MAJOR DEPRESSIVE DISORDER (HCC): Primary | ICD-10-CM

## 2024-01-05 PROCEDURE — 99213 OFFICE O/P EST LOW 20 MIN: CPT | Performed by: PHYSICIAN ASSISTANT

## 2024-01-05 RX ORDER — ESCITALOPRAM OXALATE 5 MG/1
5 TABLET ORAL DAILY
Qty: 90 TABLET | Refills: 0 | Status: SHIPPED | OUTPATIENT
Start: 2024-01-05
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
## 2024-01-12 NOTE — PROGRESS NOTES
Assessment/Plan:    Gallbladder polyp  Patient is a pleasant 48-year-old female with a strong family history significant for gastric colon and ovarian carcinoma presenting with a 5 mm gallbladder polyp for which general surgery consultation was recommended.    Current patient she has intermittent right upper quadrant abdominal pain belching bloating symptoms.  She underwent an ultrasound in April 2023 which confirmed the presence of a 5 mm gallbladder polyp.    On physical examination she is well-nourished well-appearing female.  Pleasant competent reliable as a historian.  She has a benign abdominal examination free from tenderness to percussion or palpation.    Patient has a 5 mm gallbladder polyp.    The natural history of gallbladder polyps was explained to the patient.  She understands that 70 to 80% of these polyps are benign cholesterol pseudopolyps which harbor no malignant potential.    Given that reassurance the patient was offered 2 options.  1 the first being repeat ultrasound 1 year following the original study which would take us to April 2024.  Alternatively laparoscopic cholecystectomy could be considered for definitive diagnosis and treatment.    If the patient is interested in pursuing cholecystectomy I would recommend HIDA scan prior to surgery.    The merits of each approach discussed with the patient.  The patient prefers to avoid an operation.  I believe this to be a safe and reasonable choice on her part.    I have ordered an ultrasound of the gallbladder for April 15, 2024.  Look forward to seeing her back after that test at which point we will make additional diagnostic and therapeutic treatment recommendations accordingly.       Diagnoses and all orders for this visit:    Gallbladder polyp  -     Ambulatory Referral to General Surgery  -     US right upper quadrant; Future          Subjective:      Patient ID: Mikala Simmons is a 48 y.o. female.    Patient is here for Gallbladder polyp.  "Patient states she occasionally has pinching pains on her left side and suffers from a side sticker underneath her rib cage on her right side. Patient also reports constant nausea but denies vomiting, diarrhea/constipation, trouble eating/drinking, or fevers/chills. JOHNNY Grimaldo          Review of Systems   Constitutional:  Negative for chills and fever.   HENT:  Negative for ear pain and sore throat.    Eyes:  Negative for pain and visual disturbance.   Respiratory:  Negative for cough and shortness of breath.    Cardiovascular:  Negative for chest pain and palpitations.   Gastrointestinal:  Negative for abdominal pain and vomiting.   Genitourinary:  Negative for dysuria and hematuria.   Musculoskeletal:  Negative for arthralgias and back pain.   Skin:  Negative for color change and rash.   Neurological:  Negative for seizures and syncope.   All other systems reviewed and are negative.        Objective:      /70 (BP Location: Left arm, Patient Position: Sitting, Cuff Size: Standard)   Pulse 74   Temp (!) 97.3 °F (36.3 °C) (Temporal)   Resp 18   Ht 5' 2\" (1.575 m)   Wt 71.2 kg (157 lb)   SpO2 99%   BMI 28.72 kg/m²          Physical Exam  Constitutional:       Appearance: She is well-developed.   HENT:      Head: Normocephalic and atraumatic.   Eyes:      Conjunctiva/sclera: Conjunctivae normal.      Pupils: Pupils are equal, round, and reactive to light.   Cardiovascular:      Rate and Rhythm: Normal rate and regular rhythm.   Pulmonary:      Effort: Pulmonary effort is normal.      Breath sounds: Normal breath sounds.   Abdominal:      General: Bowel sounds are normal.      Palpations: Abdomen is soft.      Comments: Benign abdominal examination   Musculoskeletal:         General: Normal range of motion.      Cervical back: Normal range of motion and neck supple.   Skin:     General: Skin is warm and dry.   Neurological:      Mental Status: She is alert and oriented to person, place, and time. "   Psychiatric:         Behavior: Behavior normal.         Thought Content: Thought content normal.         Judgment: Judgment normal.

## 2024-01-14 DIAGNOSIS — E78.5 DYSLIPIDEMIA: ICD-10-CM

## 2024-01-15 ENCOUNTER — CONSULT (OUTPATIENT)
Dept: SURGERY | Facility: CLINIC | Age: 49
End: 2024-01-15
Payer: COMMERCIAL

## 2024-01-15 VITALS
DIASTOLIC BLOOD PRESSURE: 70 MMHG | RESPIRATION RATE: 18 BRPM | OXYGEN SATURATION: 99 % | BODY MASS INDEX: 28.89 KG/M2 | WEIGHT: 157 LBS | SYSTOLIC BLOOD PRESSURE: 130 MMHG | HEIGHT: 62 IN | HEART RATE: 74 BPM | TEMPERATURE: 97.3 F

## 2024-01-15 DIAGNOSIS — E55.9 VITAMIN D DEFICIENCY: ICD-10-CM

## 2024-01-15 DIAGNOSIS — K82.4 GALLBLADDER POLYP: Primary | ICD-10-CM

## 2024-01-15 PROCEDURE — 99203 OFFICE O/P NEW LOW 30 MIN: CPT | Performed by: SURGERY

## 2024-01-15 RX ORDER — ATORVASTATIN CALCIUM 20 MG/1
TABLET, FILM COATED ORAL
Qty: 90 TABLET | Refills: 2 | Status: SHIPPED | OUTPATIENT
Start: 2024-01-15

## 2024-01-15 RX ORDER — ERGOCALCIFEROL 1.25 MG/1
CAPSULE ORAL
Qty: 12 CAPSULE | Refills: 2 | Status: SHIPPED | OUTPATIENT
Start: 2024-01-15

## 2024-01-15 NOTE — ASSESSMENT & PLAN NOTE
Patient is a pleasant 48-year-old female with a strong family history significant for gastric colon and ovarian carcinoma presenting with a 5 mm gallbladder polyp for which general surgery consultation was recommended.    Current patient she has intermittent right upper quadrant abdominal pain belching bloating symptoms.  She underwent an ultrasound in April 2023 which confirmed the presence of a 5 mm gallbladder polyp.    On physical examination she is well-nourished well-appearing female.  Simin competent reliable as a historian.  She has a benign abdominal examination free from tenderness to percussion or palpation.    Patient has a 5 mm gallbladder polyp.    The natural history of gallbladder polyps was explained to the patient.  She understands that 70 to 80% of these polyps are benign cholesterol pseudopolyps which harbor no malignant potential.    Given that reassurance the patient was offered 2 options.  1 the first being repeat ultrasound 1 year following the original study which would take us to April 2024.  Alternatively laparoscopic cholecystectomy could be considered for definitive diagnosis and treatment.    If the patient is interested in pursuing cholecystectomy I would recommend HIDA scan prior to surgery.    The merits of each approach discussed with the patient.  The patient prefers to avoid an operation.  I believe this to be a safe and reasonable choice on her part.    I have ordered an ultrasound of the gallbladder for April 15, 2024.  Look forward to seeing her back after that test at which point we will make additional diagnostic and therapeutic treatment recommendations accordingly.

## 2024-01-16 ENCOUNTER — TELEMEDICINE (OUTPATIENT)
Dept: BEHAVIORAL/MENTAL HEALTH CLINIC | Facility: CLINIC | Age: 49
End: 2024-01-16
Payer: COMMERCIAL

## 2024-01-16 DIAGNOSIS — F33.1 MDD (MAJOR DEPRESSIVE DISORDER), RECURRENT EPISODE, MODERATE (HCC): Primary | ICD-10-CM

## 2024-01-16 PROCEDURE — 90834 PSYTX W PT 45 MINUTES: CPT

## 2024-01-16 NOTE — PSYCH
"Behavioral Health Psychotherapy Progress Note    Psychotherapy Provided: Individual Psychotherapy     1. MDD (major depressive disorder), recurrent episode, moderate (HCC)            Goals addressed in session: Goal 1     DATA: Met with Mikala. Session began with open ended questions to gain feedback on mood. Mikala expressed that she is currently struggling with impulse control. She reports getting jealous again when her  is at work and she knows there are females around. Session focused on processing fears of abandonment and loss. She expressed that she saw her brother for the first time in 3 years and she asked him why they do not talk anymore and his response was she kept the money from their parent's house. She processed the grief process with her parents and their wishes. She expressed she feels this triggers her anxious thoughts about her  leaving her because \"I have no family.\" Session ended with processed distress tolerance skills to implement when she feels her body cues of anxiety that she reports being racing thoughts, chest tightness and \"nervous\" stomach.   During this session, this clinician used the following therapeutic modalities: Cognitive Behavioral Therapy and Supportive Psychotherapy    Substance Abuse was not addressed during this session. If the client is diagnosed with a co-occurring substance use disorder, please indicate any changes in the frequency or amount of use: n/a. Stage of change for addressing substance use diagnoses: No substance use/Not applicable    ASSESSMENT:  Mikala Simmons presents with a Euthymic/ normal mood.     her affect is Normal range and intensity, which is congruent, with her mood and the content of the session. The client has made progress on their goals.     Mikala Simmons presents with a none risk of suicide, none risk of self-harm, and none risk of harm to others.    For any risk assessment that surpasses a \"low\" rating, a safety plan " must be developed.    A safety plan was indicated: no  If yes, describe in detail n/a    PLAN: Between sessions, Mikala Simmons will use distress tolerance skills. At the next session, the therapist will use Cognitive Behavioral Therapy and Supportive Psychotherapy to address emotion regulation.    Behavioral Health Treatment Plan and Discharge Planning: Mikala Simmons is aware of and agrees to continue to work on their treatment plan. They have identified and are working toward their discharge goals. yes    Visit start and stop times:    01/16/24  Start Time: 1400  Stop Time: 1444  Total Visit Time: 44 minutes

## 2024-01-17 ENCOUNTER — CONSULT (OUTPATIENT)
Age: 49
End: 2024-01-17
Payer: COMMERCIAL

## 2024-01-17 VITALS
WEIGHT: 155 LBS | DIASTOLIC BLOOD PRESSURE: 68 MMHG | BODY MASS INDEX: 28.52 KG/M2 | HEART RATE: 66 BPM | OXYGEN SATURATION: 90 % | TEMPERATURE: 98 F | SYSTOLIC BLOOD PRESSURE: 118 MMHG | RESPIRATION RATE: 17 BRPM | HEIGHT: 62 IN

## 2024-01-17 DIAGNOSIS — K52.9 COLITIS: Primary | ICD-10-CM

## 2024-01-17 DIAGNOSIS — R10.12 LEFT UPPER QUADRANT ABDOMINAL PAIN: ICD-10-CM

## 2024-01-17 DIAGNOSIS — Z80.0 FAMILY HISTORY OF GASTRIC CANCER: ICD-10-CM

## 2024-01-17 DIAGNOSIS — K82.4 GALLBLADDER POLYP: ICD-10-CM

## 2024-01-17 DIAGNOSIS — Z80.0 FAMILY HISTORY OF COLON CANCER IN FATHER: ICD-10-CM

## 2024-01-17 DIAGNOSIS — R10.13 EPIGASTRIC PAIN: ICD-10-CM

## 2024-01-17 DIAGNOSIS — Z12.11 COLON CANCER SCREENING: ICD-10-CM

## 2024-01-17 PROCEDURE — 99204 OFFICE O/P NEW MOD 45 MIN: CPT | Performed by: STUDENT IN AN ORGANIZED HEALTH CARE EDUCATION/TRAINING PROGRAM

## 2024-01-17 RX ORDER — SODIUM, POTASSIUM,MAG SULFATES 17.5-3.13G
177 SOLUTION, RECONSTITUTED, ORAL ORAL ONCE
Qty: 354 ML | Refills: 0 | Status: SHIPPED | OUTPATIENT
Start: 2024-01-17 | End: 2024-01-17

## 2024-01-17 NOTE — PROGRESS NOTES
[facility]  Outpatient Consultation  Encounter: [id number]     PATIENT INFO     Name: Mikala Simmons  YOB: 1975   Age: 48 y.o.   Sex: female   MRN: [id number]    ASSESSMENT & PLAN     Mikala Simmons is a 48 y.o. female with evidence of colitis on prior CT which was done for abdominal pain, family history of colon cancer in her father diagnosed in his 70s, intermittent epigastric discomfort with family history of gastric cancer in her mother diagnosed in her 60s, and known subcentimeter gallbladder polyp.  She has not had any prior CRC screening which she is due for at this time as well.  She does report some rare episodic bright red blood per rectum which is likely related to hemorrhoids.  No other alarming lower GI symptoms.  Not on antithrombotics or anticoagulants.  She does have intermittent left upper quadrant and epigastric discomfort.  Possibly exacerbated by certain food items which may indicate atypical GERD or gastritis possibly from underlying hiatal hernia versus other potential etiology.  Fortunately without any alarming upper GI symptoms.  Not on chronic acid suppression therapy.    Given the findings of colitis and her family history of colon cancer as well as her intermittent upper GI symptoms and her family history of gastric cancer, we will schedule her for bidirectional endoscopy for further evaluation  Suprep bowel prep  Advised continued follow-up with surgery for surveillance of gallbladder polyp; repeat ultrasound has been ordered  If she continues to have any symptoms related to possible hemorrhoids, advised to follow-up with surgeon to discuss possible options  In the meantime, I have recommended increasing dietary fiber and water intake  She may use OTC Metamucil or Benefiber  Goal fiber intake of 25 to 35 g/day    1. Colitis    2. Family history of colon cancer in father    3. Epigastric pain    4. Left upper quadrant abdominal pain     5. Family history of gastric cancer    6. Gallbladder polyp    7. Colon cancer screening      Orders Placed This Encounter   Procedures    Colonoscopy    EGD       FOLLOW-UP: 6 months    HISTORY OF PRESENT ILLNESS       Mikala Simmons is a 48 y.o. female who presents to GI office for need for colon cancer screening and evidence of colitis, family history of colon cancer and gastric cancer.  Was having abdominal pain for which prompted CT imaging to be done previously.  CT was reviewed and showed evidence of moderate to severe colitis which was possibly infectious at that time.  Her symptoms have improved.  There was evidence of diverticulosis at that time as well but no evidence of diverticulitis.  She does report that her normal bowel habits are usually every 2 to 3 days.  She denies feeling constipated in between.  She does report some episodes of bright red blood per rectum typically upon wiping which she attributes to hemorrhoids.  She has not had CRC screening in the past.  She reports a family history of colon cancer in her father diagnosed in his 70s.  She also reports family history of gastric cancer in her mother diagnosed in her 60s.  She does admit to episodic epigastric and left upper quadrant pain.  This typically occurs if she eats certain food triggers.  The pain is not constant.  She is not on chronic acid suppression therapy.  Fortunately, she denies any other alarming GI symptoms including unintentional weight loss, dysphagia, odynophagia, vomiting.     ENDOSCOPIC HISTORY     UPPER ENDOSCOPY: None    COLONOSCOPY: None    REVIEW OF SYSTEMS     CONSTITUTIONAL: Denies any fever, chills, rigors, and weight loss  HEENT: No earache or tinnitus, denies hearing loss or visual disturbances  CARDIOVASCULAR: No chest pain or palpitations  RESPIRATORY: Denies any cough, hemoptysis, shortness of breath or dyspnea on exertion  GASTROINTESTINAL: As noted in the History of Present Illness  GENITOURINARY: No  problems with urination, denies any hematuria or dysuria  NEUROLOGIC: No dizziness or vertigo, denies headaches   MUSCULOSKELETAL: Denies any muscle or joint pain   SKIN: Denies skin rashes or itching  ENDOCRINE: Denies excessive thirst, denies intolerance to heat or cold  PSYCHOSOCIAL: Denies depression, denies any recent memory loss     Historical Information   Past Medical History:   Diagnosis Date    Anxiety     Depression     Generalized anxiety disorder     H/O: hysterectomy     LAST ASSESSED 30 DEC 2015    Hypertension     Obesity     Ovarian cyst     PVC (premature ventricular contraction)     Urinary tract infection      Past Surgical History:   Procedure Laterality Date    HYSTERECTOMY      TUMOR REMOVAL       Social History   Social History     Substance and Sexual Activity   Alcohol Use Not Currently    Comment: occasionally     Social History     Substance and Sexual Activity   Drug Use Yes    Types: Marijuana     Social History     Tobacco Use   Smoking Status Every Day    Current packs/day: 1.00    Average packs/day: 1 pack/day for 30.0 years (30.0 ttl pk-yrs)    Types: Cigarettes   Smokeless Tobacco Never     Family History   Problem Relation Age of Onset    Diabetes Mother     Substance Abuse Paternal Aunt     Mental illness Paternal Aunt     Substance Abuse Paternal Uncle     Mental illness Paternal Uncle        MEDICATIONS & ALLERGIES     Current Outpatient Medications   Medication Instructions    albuterol (Ventolin HFA) 90 mcg/act inhaler 2 puffs, Inhalation, Every 4 hours PRN    amLODIPine (NORVASC) 5 mg, Oral, Daily    atorvastatin (LIPITOR) 20 mg tablet TAKE 1 TABLET BY MOUTH EVERY DAY    Breo Ellipta 100-25 MCG/INH inhaler INHALE 1 PUFF DAILY RINSE MOUTH AFTER USE.    ergocalciferol (VITAMIN D2) 50,000 units TAKE 1 CAPSULE BY MOUTH ONE TIME PER WEEK    escitalopram (LEXAPRO) 10 mg, Oral, Daily    escitalopram (LEXAPRO) 5 mg, Oral, Daily, With 10 mg    ipratropium-albuterol (DUO-NEB) 0.5-2.5  "mg/3 mL nebulizer solution 3 mL, Nebulization, 4 times daily    metoprolol succinate (TOPROL-XL) 50 mg 24 hr tablet TAKE 1 TABLET BY MOUTH EVERY DAY    Na Sulfate-K Sulfate-Mg Sulf (Suprep Bowel Prep Kit) 17.5-3.13-1.6 GM/177ML SOLN 177 mL, Oral, Once, Take 177 mL by mouth once for 1 dose    NON FORMULARY MEDICAL MARIJUANA    phentermine 37.5 mg, Oral, Every morning    QUEtiapine (SEROQUEL) 25 mg, Oral, Daily at bedtime,        Allergies   Allergen Reactions    Nicotine Polacrilex [Nicotine]      patches    Fluoxetine Anxiety       PHYSICAL EXAM      Objective   Blood pressure 118/68, pulse 66, temperature 98 °F (36.7 °C), resp. rate 17, height 5' 2\" (1.575 m), weight 70.3 kg (155 lb), SpO2 90%. Body mass index is 28.35 kg/m².    General Appearance:   Alert, cooperative, no distress   HEENT:   Normocephalic, atraumatic, anicteric     Neck:   Supple, symmetrical, trachea midline   Lungs:   Equal chest rise, respirations unlabored    Heart:   Regular rate and rhythm   Abdomen:   Soft, non-tender, non-distended; normal bowel sounds; no masses, no organomegaly    Rectal:   Deferred    Extremities:   No cyanosis, clubbing or edema    Neuro:   Moves all 4 extremities    Skin:   No jaundice, rashes, or lesions       LABORATORY RESULTS     No visits with results within 1 Day(s) from this visit.   Latest known visit with results is:   Appointment on 12/18/2023   Component Date Value    WBC 12/18/2023 13.95 (H)     RBC 12/18/2023 4.54     Hemoglobin 12/18/2023 14.8     Hematocrit 12/18/2023 44.2     MCV 12/18/2023 97     MCH 12/18/2023 32.6     MCHC 12/18/2023 33.5     RDW 12/18/2023 13.9     MPV 12/18/2023 9.1     Platelets 12/18/2023 533 (H)     nRBC 12/18/2023 0     Neutrophils Relative 12/18/2023 61     Immat GRANS % 12/18/2023 1     Lymphocytes Relative 12/18/2023 31     Monocytes Relative 12/18/2023 6     Eosinophils Relative 12/18/2023 1     Basophils Relative 12/18/2023 0     Neutrophils Absolute 12/18/2023 8.52 (H)  "    Immature Grans Absolute 12/18/2023 0.07     Lymphocytes Absolute 12/18/2023 4.32     Monocytes Absolute 12/18/2023 0.82     Eosinophils Absolute 12/18/2023 0.16     Basophils Absolute 12/18/2023 0.06         IMAGING RESULTS     No results found.  I have personally reviewed pertinent imaging study reports.      Ruperto Brandt D.O.  [facility]  Division of Gastroenterology & Hepatology  Available on TigerText  [email or web address]    ** Please Note: This note is constructed using a voice recognition dictation system. **

## 2024-01-17 NOTE — PATIENT INSTRUCTIONS
We will schedule you for upper endoscopy (EGD) and colonoscopy  Please follow the instructions for the bowel prep  You may increase fiber in your diet and try to drink more water  You may supplement fiber in your diet using Metamucil or Benefiber or fiber gummies which are available over-the-counter  The goal is 25-35 grams of fiber per day

## 2024-01-25 DIAGNOSIS — F33.1 MODERATE EPISODE OF RECURRENT MAJOR DEPRESSIVE DISORDER (HCC): ICD-10-CM

## 2024-01-25 DIAGNOSIS — F43.10 PTSD (POST-TRAUMATIC STRESS DISORDER): ICD-10-CM

## 2024-01-29 ENCOUNTER — OFFICE VISIT (OUTPATIENT)
Dept: PSYCHIATRY | Facility: CLINIC | Age: 49
End: 2024-01-29
Payer: COMMERCIAL

## 2024-01-29 DIAGNOSIS — F41.1 GENERALIZED ANXIETY DISORDER: ICD-10-CM

## 2024-01-29 DIAGNOSIS — F33.1 MODERATE EPISODE OF RECURRENT MAJOR DEPRESSIVE DISORDER (HCC): Primary | ICD-10-CM

## 2024-01-29 DIAGNOSIS — F43.10 PTSD (POST-TRAUMATIC STRESS DISORDER): ICD-10-CM

## 2024-01-29 PROCEDURE — 99214 OFFICE O/P EST MOD 30 MIN: CPT | Performed by: PHYSICIAN ASSISTANT

## 2024-01-29 RX ORDER — QUETIAPINE FUMARATE 25 MG/1
25 TABLET, FILM COATED ORAL
Qty: 90 TABLET | Refills: 0 | Status: SHIPPED | OUTPATIENT
Start: 2024-01-29
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
## 2024-01-30 ENCOUNTER — TELEMEDICINE (OUTPATIENT)
Dept: BEHAVIORAL/MENTAL HEALTH CLINIC | Facility: CLINIC | Age: 49
End: 2024-01-30
Payer: COMMERCIAL

## 2024-01-30 DIAGNOSIS — F43.10 PTSD (POST-TRAUMATIC STRESS DISORDER): ICD-10-CM

## 2024-01-30 DIAGNOSIS — F33.1 MDD (MAJOR DEPRESSIVE DISORDER), RECURRENT EPISODE, MODERATE (HCC): Primary | ICD-10-CM

## 2024-01-30 DIAGNOSIS — E66.01 CLASS 3 SEVERE OBESITY DUE TO EXCESS CALORIES WITHOUT SERIOUS COMORBIDITY WITH BODY MASS INDEX (BMI) OF 40.0 TO 44.9 IN ADULT (HCC): ICD-10-CM

## 2024-01-30 PROCEDURE — 90834 PSYTX W PT 45 MINUTES: CPT

## 2024-01-30 RX ORDER — PHENTERMINE HYDROCHLORIDE 37.5 MG/1
37.5 CAPSULE ORAL EVERY MORNING
Qty: 30 CAPSULE | Refills: 0 | Status: SHIPPED | OUTPATIENT
Start: 2024-01-30

## 2024-01-30 NOTE — PSYCH
"Behavioral Health Psychotherapy Progress Note    Psychotherapy Provided: Individual Psychotherapy     1. MDD (major depressive disorder), recurrent episode, moderate (HCC)        2. PTSD (post-traumatic stress disorder)            Goals addressed in session: Goal 1     DATA: Met with Mikala. Session began with open ended questions to gain feedback on mood. She expressed feeling a bit better than last visit. Session focused on rapport building and addressing her areas of concern. Mikala struggles with self-esteem and body image issues. She puts a lot of her self-worth onto how she looks. She expressed she woories that when she is older her  will \"trade her in for a newer version.\" She expressed this makes her feel very jealous. Discussed and processed what to her is successful and she processed feeling she will never be good enough because she is not a mother. Mikala sturggles with not feeling a big connection with family and fears her  will leave her which is her only family that talks with her. Session ended with processed goals to work on in regards to self-esteem. She will journal over the next two weeks.   During this session, this clinician used the following therapeutic modalities: Cognitive Behavioral Therapy and Supportive Psychotherapy    Substance Abuse was not addressed during this session. If the client is diagnosed with a co-occurring substance use disorder, please indicate any changes in the frequency or amount of use: n/a. Stage of change for addressing substance use diagnoses: No substance use/Not applicable    ASSESSMENT:  Mikala Simmons presents with a Euthymic/ normal mood.     her affect is Normal range and intensity, which is congruent, with her mood and the content of the session. The client has made progress on their goals.     Mikala Simmons presents with a none risk of suicide, none risk of self-harm, and none risk of harm to others.    For any risk assessment " "that surpasses a \"low\" rating, a safety plan must be developed.    A safety plan was indicated: no  If yes, describe in detail n/a    PLAN: Between sessions, Mikala Simmons will journal. At the next session, the therapist will use Cognitive Behavioral Therapy and Supportive Psychotherapy to address self-esteem building.    Behavioral Health Treatment Plan and Discharge Planning: Mikala Simmons is aware of and agrees to continue to work on their treatment plan. They have identified and are working toward their discharge goals. yes    Visit start and stop times:    01/30/24  Start Time: 1000  Stop Time: 1050  Total Visit Time: 50 minutes  "

## 2024-02-13 ENCOUNTER — TELEMEDICINE (OUTPATIENT)
Dept: BEHAVIORAL/MENTAL HEALTH CLINIC | Facility: CLINIC | Age: 49
End: 2024-02-13
Payer: COMMERCIAL

## 2024-02-13 DIAGNOSIS — F33.1 MDD (MAJOR DEPRESSIVE DISORDER), RECURRENT EPISODE, MODERATE (HCC): Primary | ICD-10-CM

## 2024-02-13 PROCEDURE — 90834 PSYTX W PT 45 MINUTES: CPT

## 2024-02-15 DIAGNOSIS — I49.3 PVC (PREMATURE VENTRICULAR CONTRACTION): ICD-10-CM

## 2024-02-15 RX ORDER — LIDOCAINE HYDROCHLORIDE 10 MG/ML
0.5 INJECTION, SOLUTION EPIDURAL; INFILTRATION; INTRACAUDAL; PERINEURAL ONCE AS NEEDED
Status: CANCELLED | OUTPATIENT
Start: 2024-02-15

## 2024-02-15 RX ORDER — SODIUM CHLORIDE, SODIUM LACTATE, POTASSIUM CHLORIDE, CALCIUM CHLORIDE 600; 310; 30; 20 MG/100ML; MG/100ML; MG/100ML; MG/100ML
50 INJECTION, SOLUTION INTRAVENOUS CONTINUOUS
Status: CANCELLED | OUTPATIENT
Start: 2024-02-15

## 2024-02-15 RX ORDER — METOPROLOL SUCCINATE 50 MG/1
TABLET, EXTENDED RELEASE ORAL
Qty: 90 TABLET | Refills: 1 | Status: SHIPPED | OUTPATIENT
Start: 2024-02-15

## 2024-02-16 ENCOUNTER — ANESTHESIA (OUTPATIENT)
Dept: GASTROENTEROLOGY | Facility: HOSPITAL | Age: 49
End: 2024-02-16

## 2024-02-16 ENCOUNTER — ANESTHESIA EVENT (OUTPATIENT)
Dept: GASTROENTEROLOGY | Facility: HOSPITAL | Age: 49
End: 2024-02-16

## 2024-02-16 ENCOUNTER — HOSPITAL ENCOUNTER (OUTPATIENT)
Dept: GASTROENTEROLOGY | Facility: HOSPITAL | Age: 49
Setting detail: OUTPATIENT SURGERY
End: 2024-02-16
Attending: STUDENT IN AN ORGANIZED HEALTH CARE EDUCATION/TRAINING PROGRAM
Payer: COMMERCIAL

## 2024-02-16 VITALS
RESPIRATION RATE: 16 BRPM | OXYGEN SATURATION: 99 % | HEART RATE: 65 BPM | WEIGHT: 150 LBS | TEMPERATURE: 97.3 F | HEIGHT: 62 IN | DIASTOLIC BLOOD PRESSURE: 81 MMHG | SYSTOLIC BLOOD PRESSURE: 133 MMHG | BODY MASS INDEX: 27.6 KG/M2

## 2024-02-16 DIAGNOSIS — Z80.0 FAMILY HISTORY OF GASTRIC CANCER: ICD-10-CM

## 2024-02-16 DIAGNOSIS — Z80.0 FAMILY HISTORY OF COLON CANCER IN FATHER: ICD-10-CM

## 2024-02-16 DIAGNOSIS — K52.9 COLITIS: ICD-10-CM

## 2024-02-16 DIAGNOSIS — R10.12 LEFT UPPER QUADRANT ABDOMINAL PAIN: ICD-10-CM

## 2024-02-16 DIAGNOSIS — R10.13 EPIGASTRIC PAIN: ICD-10-CM

## 2024-02-16 PROCEDURE — 43239 EGD BIOPSY SINGLE/MULTIPLE: CPT | Performed by: STUDENT IN AN ORGANIZED HEALTH CARE EDUCATION/TRAINING PROGRAM

## 2024-02-16 PROCEDURE — 88305 TISSUE EXAM BY PATHOLOGIST: CPT | Performed by: STUDENT IN AN ORGANIZED HEALTH CARE EDUCATION/TRAINING PROGRAM

## 2024-02-16 PROCEDURE — 45385 COLONOSCOPY W/LESION REMOVAL: CPT | Performed by: STUDENT IN AN ORGANIZED HEALTH CARE EDUCATION/TRAINING PROGRAM

## 2024-02-16 RX ORDER — SODIUM CHLORIDE, SODIUM LACTATE, POTASSIUM CHLORIDE, CALCIUM CHLORIDE 600; 310; 30; 20 MG/100ML; MG/100ML; MG/100ML; MG/100ML
50 INJECTION, SOLUTION INTRAVENOUS CONTINUOUS
Status: DISCONTINUED | OUTPATIENT
Start: 2024-02-16 | End: 2024-02-20 | Stop reason: HOSPADM

## 2024-02-16 RX ORDER — LIDOCAINE HYDROCHLORIDE 20 MG/ML
INJECTION, SOLUTION EPIDURAL; INFILTRATION; INTRACAUDAL; PERINEURAL AS NEEDED
Status: DISCONTINUED | OUTPATIENT
Start: 2024-02-16 | End: 2024-02-16

## 2024-02-16 RX ORDER — PROPOFOL 10 MG/ML
INJECTION, EMULSION INTRAVENOUS AS NEEDED
Status: DISCONTINUED | OUTPATIENT
Start: 2024-02-16 | End: 2024-02-16

## 2024-02-16 RX ORDER — PROPOFOL 10 MG/ML
INJECTION, EMULSION INTRAVENOUS CONTINUOUS PRN
Status: DISCONTINUED | OUTPATIENT
Start: 2024-02-16 | End: 2024-02-16

## 2024-02-16 RX ORDER — LIDOCAINE HYDROCHLORIDE 10 MG/ML
0.5 INJECTION, SOLUTION EPIDURAL; INFILTRATION; INTRACAUDAL; PERINEURAL ONCE AS NEEDED
Status: DISCONTINUED | OUTPATIENT
Start: 2024-02-16 | End: 2024-02-20 | Stop reason: HOSPADM

## 2024-02-16 RX ADMIN — PROPOFOL 50 MG: 10 INJECTION, EMULSION INTRAVENOUS at 11:18

## 2024-02-16 RX ADMIN — PROPOFOL 120 MCG/KG/MIN: 10 INJECTION, EMULSION INTRAVENOUS at 11:20

## 2024-02-16 RX ADMIN — PROPOFOL 100 MG: 10 INJECTION, EMULSION INTRAVENOUS at 11:10

## 2024-02-16 RX ADMIN — PROPOFOL 50 MG: 10 INJECTION, EMULSION INTRAVENOUS at 11:14

## 2024-02-16 RX ADMIN — PROPOFOL 30 MG: 10 INJECTION, EMULSION INTRAVENOUS at 11:25

## 2024-02-16 RX ADMIN — LIDOCAINE HYDROCHLORIDE 100 MG: 20 INJECTION, SOLUTION EPIDURAL; INFILTRATION; INTRACAUDAL; PERINEURAL at 11:10

## 2024-02-16 RX ADMIN — PROPOFOL 50 MG: 10 INJECTION, EMULSION INTRAVENOUS at 11:21

## 2024-02-16 RX ADMIN — SODIUM CHLORIDE, SODIUM LACTATE, POTASSIUM CHLORIDE, AND CALCIUM CHLORIDE: .6; .31; .03; .02 INJECTION, SOLUTION INTRAVENOUS at 10:54

## 2024-02-16 RX ADMIN — SODIUM CHLORIDE, SODIUM LACTATE, POTASSIUM CHLORIDE, AND CALCIUM CHLORIDE 50 ML/HR: .6; .31; .03; .02 INJECTION, SOLUTION INTRAVENOUS at 10:08

## 2024-02-16 NOTE — ANESTHESIA PREPROCEDURE EVALUATION
Procedure:  COLONOSCOPY  EGD    Relevant Problems   CARDIO   (+) Benign essential hypertension   (+) PVC (premature ventricular contraction)      NEURO/PSYCH   (+) Generalized anxiety disorder   (+) Major depressive disorder with current active episode      PULMONARY   (+) Chronic obstructive pulmonary disease, unspecified COPD type (HCC)   (+) ALFONSO (obstructive sleep apnea)   (+) Smoking        Physical Exam    Airway    Mallampati score: II  TM Distance: >3 FB  Neck ROM: full     Dental   No notable dental hx     Cardiovascular      Pulmonary      Other Findings  post-pubertal.      Anesthesia Plan  ASA Score- 2     Anesthesia Type- IV sedation with anesthesia with ASA Monitors.         Additional Monitors:     Airway Plan:     Comment: I discussed the risks and benefits of IV sedation anesthesia including the possibility of the need to convert to general anesthesia and the potential risk of awareness.       Plan Factors-Exercise tolerance (METS): >4 METS.    Chart reviewed.   Existing labs reviewed.     Patient is a current smoker.              Induction- intravenous.    Postoperative Plan-     Informed Consent- Anesthetic plan and risks discussed with patient.

## 2024-02-16 NOTE — H&P
[facility]  History & Physical     PATIENT INFO     Name: Mikala Simmons  YOB: 1975   Age: 48 y.o.   Sex: female   MRN: [id number]     HISTORY OF PRESENT ILLNESS     Mikala Simmons is a 48 y.o. year old female who presents for EGD and colonoscopy for epigastric pain, family history of gastric cancer, left upper quadrant pain, colitis, family history of colon cancer.  No prior colonoscopy.  No antithrombotics or anticoagulants.      REVIEW OF SYSTEMS     Per the HPI, and otherwise unremarkable.    Historical Information   Past Medical History:   Diagnosis Date    Anxiety     Depression     Generalized anxiety disorder     H/O: hysterectomy     LAST ASSESSED 30 DEC 2015    Hypertension     Obesity     Ovarian cyst     PVC (premature ventricular contraction)     Urinary tract infection      Past Surgical History:   Procedure Laterality Date    HYSTERECTOMY      TUMOR REMOVAL       Social History   Social History     Substance and Sexual Activity   Alcohol Use Not Currently    Comment: occasionally     Social History     Substance and Sexual Activity   Drug Use Yes    Types: Marijuana     Social History     Tobacco Use   Smoking Status Every Day    Current packs/day: 1.00    Average packs/day: 1 pack/day for 30.0 years (30.0 ttl pk-yrs)    Types: Cigarettes   Smokeless Tobacco Never     Family History   Problem Relation Age of Onset    Diabetes Mother     Substance Abuse Paternal Aunt     Mental illness Paternal Aunt     Substance Abuse Paternal Uncle     Mental illness Paternal Uncle         MEDICATIONS & ALLERGIES     Current Outpatient Medications   Medication Instructions    albuterol (Ventolin HFA) 90 mcg/act inhaler 2 puffs, Inhalation, Every 4 hours PRN    amLODIPine (NORVASC) 5 mg, Oral, Daily    atorvastatin (LIPITOR) 20 mg tablet TAKE 1 TABLET BY MOUTH EVERY DAY    Breo Ellipta 100-25 MCG/INH inhaler INHALE 1 PUFF DAILY RINSE MOUTH AFTER USE.     "ergocalciferol (VITAMIN D2) 50,000 units TAKE 1 CAPSULE BY MOUTH ONE TIME PER WEEK    escitalopram (LEXAPRO) 10 mg, Oral, Daily    escitalopram (LEXAPRO) 5 mg, Oral, Daily, With 10 mg    ipratropium-albuterol (DUO-NEB) 0.5-2.5 mg/3 mL nebulizer solution 3 mL, Nebulization, 4 times daily    metoprolol succinate (TOPROL-XL) 50 mg 24 hr tablet TAKE 1 TABLET BY MOUTH EVERY DAY    Na Sulfate-K Sulfate-Mg Sulf (Suprep Bowel Prep Kit) 17.5-3.13-1.6 GM/177ML SOLN 177 mL, Oral, Once, Take 177 mL by mouth once for 1 dose    NON FORMULARY MEDICAL MARIJUANA    phentermine 37.5 mg, Oral, Every morning    QUEtiapine (SEROQUEL) 25 mg, Oral, Daily at bedtime,        Allergies   Allergen Reactions    Nicotine Polacrilex [Nicotine]      patches    Fluoxetine Anxiety        PHYSICAL EXAM      Objective   Blood pressure 119/70, pulse 68, temperature 97.9 °F (36.6 °C), temperature source Temporal, resp. rate 18, height 5' 2\" (1.575 m), weight 68 kg (150 lb), SpO2 98%. Body mass index is 27.44 kg/m².    General Appearance:   Alert, cooperative, no distress   Lungs:   Equal chest rise, respirations unlabored    Heart:   Regular rate and rhythm   Abdomen:   Soft, non-tender, non-distended; normal bowel sounds; no masses, no organomegaly    Extremities:   No edema       ASSESSMENT & PLAN     This is a 48 y.o. year old female here for EGD and colonoscopy, and she is stable and optimized for her procedure.      Ruperto Brandt D.O.  [facility]  Division of Gastroenterology & Hepatology  Available on Tiger[employer]  [email or web address]    ** Please Note: This note is constructed using a voice recognition dictation system. **  "

## 2024-02-16 NOTE — ANESTHESIA POSTPROCEDURE EVALUATION
Post-Op Assessment Note    CV Status:  Stable  Pain Score: 0    Pain management: adequate       Mental Status:  Sleepy   Hydration Status:  Euvolemic   PONV Controlled:  Controlled   Airway Patency:  Patent     Post Op Vitals Reviewed: Yes    No anethesia notable event occurred.    Staff: CRNA           BP      Temp      Pulse     Resp      SpO2

## 2024-02-19 PROCEDURE — 88305 TISSUE EXAM BY PATHOLOGIST: CPT | Performed by: STUDENT IN AN ORGANIZED HEALTH CARE EDUCATION/TRAINING PROGRAM

## 2024-02-19 NOTE — PSYCH
"Behavioral Health Psychotherapy Progress Note    Psychotherapy Provided: Individual Psychotherapy     1. MDD (major depressive disorder), recurrent episode, moderate (HCC)            Goals addressed in session: Goal 1     DATA: Met with Mikala. Session began with open ended questions to gain feedback on mood. She expressed feeling in better spirits since her last session. She did go back to work for her last job part time and remotely. She feels the remote part time work will be beneficial. She was feeling that she was not contributing financially and this was making her think negatively about herself. Session focused on cognitive distortions and negative self-talk that supports a low mood. She was able to engage in cognitive coping exercises and she will practice these for homework.  During this session, this clinician used the following therapeutic modalities: Cognitive Behavioral Therapy and Supportive Psychotherapy    Substance Abuse was not addressed during this session. If the client is diagnosed with a co-occurring substance use disorder, please indicate any changes in the frequency or amount of use: n/a. Stage of change for addressing substance use diagnoses: No substance use/Not applicable    ASSESSMENT:  Mikala Simmons presents with a Euthymic/ normal mood.     her affect is Normal range and intensity, which is congruent, with her mood and the content of the session. The client has made progress on their goals.     Mikala Simmons presents with a none risk of suicide, none risk of self-harm, and none risk of harm to others.    For any risk assessment that surpasses a \"low\" rating, a safety plan must be developed.    A safety plan was indicated: no  If yes, describe in detail n/a    PLAN: Between sessions, Mikala Simmons will use natural supports and cognitive coping exercises. At the next session, the therapist will use Cognitive Behavioral Therapy and Supportive Psychotherapy to address " cognitive distortions.    Behavioral Health Treatment Plan and Discharge Planning: Mikala Simmons is aware of and agrees to continue to work on their treatment plan. They have identified and are working toward their discharge goals. yes    Visit start and stop times:    02/13/24  Start Time: 1400  Stop Time: 1445  Total Visit Time: 45 minutes

## 2024-02-21 PROBLEM — Z12.39 SCREENING FOR BREAST CANCER: Status: RESOLVED | Noted: 2018-05-04 | Resolved: 2024-02-21

## 2024-02-21 PROBLEM — Z13.6 SCREENING FOR CARDIOVASCULAR CONDITION: Status: RESOLVED | Noted: 2018-05-04 | Resolved: 2024-02-21

## 2024-02-21 PROBLEM — Z12.11 COLON CANCER SCREENING: Status: RESOLVED | Noted: 2024-01-17 | Resolved: 2024-02-21

## 2024-02-28 ENCOUNTER — TELEMEDICINE (OUTPATIENT)
Dept: BEHAVIORAL/MENTAL HEALTH CLINIC | Facility: CLINIC | Age: 49
End: 2024-02-28
Payer: COMMERCIAL

## 2024-02-28 DIAGNOSIS — F33.41 RECURRENT MAJOR DEPRESSIVE DISORDER, IN PARTIAL REMISSION (HCC): Primary | ICD-10-CM

## 2024-02-28 PROCEDURE — 90832 PSYTX W PT 30 MINUTES: CPT

## 2024-03-04 NOTE — PSYCH
"Behavioral Health Psychotherapy Progress Note    Psychotherapy Provided: Individual Psychotherapy     1. Recurrent major depressive disorder, in partial remission (HCC)            Goals addressed in session: Goal 1     DATA: Met with Mikala. Session began with open ended questions to gain feedback on mood. She expressed positive spirits. She reports that he mood has been boosted and she feels a sense of purpose with being back to work. She reports her job has been doing well with boundaries and have not been asking her to do all the work, like when she initially left the job. She expressed happiness that she has time to do her crafts and her crafts will now be in an [email or web address] shop which she is very happy about. She expressed that she has been doing better with feelings of jealousy with her  and was able to identify mantras that work for her to ground her. Mikala continues to struggle with body image issues. Session focused on processing history and origin of these negative intrusrive thoughts and how to combat them.   During this session, this clinician used the following therapeutic modalities: Cognitive Behavioral Therapy and Supportive Psychotherapy    Substance Abuse was not addressed during this session. If the client is diagnosed with a co-occurring substance use disorder, please indicate any changes in the frequency or amount of use: n/a. Stage of change for addressing substance use diagnoses: No substance use/Not applicable    ASSESSMENT:  Mikala Simmons presents with a Euthymic/ normal mood.     her affect is Normal range and intensity, which is congruent, with her mood and the content of the session. The client has made progress on their goals.     Mikala Simmons presents with a none risk of suicide, none risk of self-harm, and none risk of harm to others.    For any risk assessment that surpasses a \"low\" rating, a safety plan must be developed.    A safety plan was indicated: no  If yes, " describe in detail n/a    PLAN: Between sessions, Mikala Simmons will use natural supports and coping skills. At the next session, the therapist will use Cognitive Behavioral Therapy and Supportive Psychotherapy to address self-esteem building.    Behavioral Health Treatment Plan and Discharge Planning: Mikala Simmons is aware of and agrees to continue to work on their treatment plan. They have identified and are working toward their discharge goals. yes    Visit start and stop times:    02/28/24  Start Time: 1400  Stop Time: 1435  Total Visit Time: 35 minutes

## 2024-03-20 ENCOUNTER — TELEMEDICINE (OUTPATIENT)
Dept: PSYCHIATRY | Facility: CLINIC | Age: 49
End: 2024-03-20
Payer: COMMERCIAL

## 2024-03-20 DIAGNOSIS — F33.1 MODERATE EPISODE OF RECURRENT MAJOR DEPRESSIVE DISORDER (HCC): Primary | ICD-10-CM

## 2024-03-20 DIAGNOSIS — F41.1 GENERALIZED ANXIETY DISORDER: ICD-10-CM

## 2024-03-20 DIAGNOSIS — Z79.899 ENCOUNTER FOR LONG-TERM (CURRENT) USE OF OTHER MEDICATIONS: ICD-10-CM

## 2024-03-20 DIAGNOSIS — F43.10 PTSD (POST-TRAUMATIC STRESS DISORDER): ICD-10-CM

## 2024-03-20 DIAGNOSIS — R94.31 PROLONGED Q-T INTERVAL ON ECG: ICD-10-CM

## 2024-03-20 PROCEDURE — 99214 OFFICE O/P EST MOD 30 MIN: CPT | Performed by: PHYSICIAN ASSISTANT

## 2024-03-20 RX ORDER — ESCITALOPRAM OXALATE 5 MG/1
5 TABLET ORAL DAILY
Qty: 90 TABLET | Refills: 0 | Status: SHIPPED | OUTPATIENT
Start: 2024-03-20 | End: 2024-03-20 | Stop reason: SINTOL

## 2024-03-20 RX ORDER — ESCITALOPRAM OXALATE 10 MG/1
10 TABLET ORAL DAILY
Qty: 90 TABLET | Refills: 0 | Status: SHIPPED | OUTPATIENT
Start: 2024-03-20
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
## 2024-03-27 ENCOUNTER — TELEMEDICINE (OUTPATIENT)
Dept: BEHAVIORAL/MENTAL HEALTH CLINIC | Facility: CLINIC | Age: 49
End: 2024-03-27
Payer: COMMERCIAL

## 2024-03-27 DIAGNOSIS — F41.1 GAD (GENERALIZED ANXIETY DISORDER): ICD-10-CM

## 2024-03-27 DIAGNOSIS — F33.1 MDD (MAJOR DEPRESSIVE DISORDER), RECURRENT EPISODE, MODERATE (HCC): Primary | ICD-10-CM

## 2024-03-27 PROCEDURE — 90832 PSYTX W PT 30 MINUTES: CPT

## 2024-03-30 ENCOUNTER — OFFICE VISIT (OUTPATIENT)
Dept: LAB | Facility: HOSPITAL | Age: 49
End: 2024-03-30
Payer: COMMERCIAL

## 2024-03-30 ENCOUNTER — APPOINTMENT (OUTPATIENT)
Dept: LAB | Facility: CLINIC | Age: 49
End: 2024-03-30
Payer: COMMERCIAL

## 2024-03-30 DIAGNOSIS — R94.31 PROLONGED Q-T INTERVAL ON ECG: ICD-10-CM

## 2024-03-30 DIAGNOSIS — Z11.59 NEED FOR HEPATITIS C SCREENING TEST: ICD-10-CM

## 2024-03-30 DIAGNOSIS — D75.839 THROMBOCYTOSIS: ICD-10-CM

## 2024-03-30 DIAGNOSIS — Z11.4 SCREENING FOR HIV (HUMAN IMMUNODEFICIENCY VIRUS): ICD-10-CM

## 2024-03-30 DIAGNOSIS — Z79.899 ENCOUNTER FOR LONG-TERM (CURRENT) USE OF OTHER MEDICATIONS: ICD-10-CM

## 2024-03-30 LAB
ATRIAL RATE: 62 BPM
BASOPHILS # BLD AUTO: 0.05 THOUSANDS/ÂΜL (ref 0–0.1)
BASOPHILS NFR BLD AUTO: 0 % (ref 0–1)
EOSINOPHIL # BLD AUTO: 0.09 THOUSAND/ÂΜL (ref 0–0.61)
EOSINOPHIL NFR BLD AUTO: 1 % (ref 0–6)
ERYTHROCYTE [DISTWIDTH] IN BLOOD BY AUTOMATED COUNT: 13.6 % (ref 11.6–15.1)
HCT VFR BLD AUTO: 47.3 % (ref 34.8–46.1)
HGB BLD-MCNC: 15 G/DL (ref 11.5–15.4)
IMM GRANULOCYTES # BLD AUTO: 0.08 THOUSAND/UL (ref 0–0.2)
IMM GRANULOCYTES NFR BLD AUTO: 1 % (ref 0–2)
LYMPHOCYTES # BLD AUTO: 4.85 THOUSANDS/ÂΜL (ref 0.6–4.47)
LYMPHOCYTES NFR BLD AUTO: 29 % (ref 14–44)
MCH RBC QN AUTO: 30.9 PG (ref 26.8–34.3)
MCHC RBC AUTO-ENTMCNC: 31.7 G/DL (ref 31.4–37.4)
MCV RBC AUTO: 97 FL (ref 82–98)
MONOCYTES # BLD AUTO: 0.89 THOUSAND/ÂΜL (ref 0.17–1.22)
MONOCYTES NFR BLD AUTO: 5 % (ref 4–12)
NEUTROPHILS # BLD AUTO: 10.82 THOUSANDS/ÂΜL (ref 1.85–7.62)
NEUTS SEG NFR BLD AUTO: 64 % (ref 43–75)
NRBC BLD AUTO-RTO: 0 /100 WBCS
P AXIS: 70 DEGREES
PLATELET # BLD AUTO: 380 THOUSANDS/UL (ref 149–390)
PMV BLD AUTO: 9.9 FL (ref 8.9–12.7)
PR INTERVAL: 174 MS
QRS AXIS: 58 DEGREES
QRSD INTERVAL: 92 MS
QT INTERVAL: 420 MS
QTC INTERVAL: 426 MS
RBC # BLD AUTO: 4.86 MILLION/UL (ref 3.81–5.12)
T WAVE AXIS: 72 DEGREES
VENTRICULAR RATE: 62 BPM
WBC # BLD AUTO: 16.78 THOUSAND/UL (ref 4.31–10.16)

## 2024-03-30 PROCEDURE — 87389 HIV-1 AG W/HIV-1&-2 AB AG IA: CPT

## 2024-03-30 PROCEDURE — 86803 HEPATITIS C AB TEST: CPT

## 2024-03-30 PROCEDURE — 93005 ELECTROCARDIOGRAM TRACING: CPT

## 2024-03-30 PROCEDURE — 85025 COMPLETE CBC W/AUTO DIFF WBC: CPT

## 2024-03-30 PROCEDURE — 36415 COLL VENOUS BLD VENIPUNCTURE: CPT

## 2024-03-30 PROCEDURE — 93010 ELECTROCARDIOGRAM REPORT: CPT | Performed by: INTERNAL MEDICINE

## 2024-03-31 LAB
HCV AB SER QL: NORMAL
HIV 1+2 AB+HIV1 P24 AG SERPL QL IA: NORMAL
HIV 2 AB SERPL QL IA: NORMAL
HIV1 AB SERPL QL IA: NORMAL
HIV1 P24 AG SERPL QL IA: NORMAL

## 2024-04-01 ENCOUNTER — DOCUMENTATION (OUTPATIENT)
Dept: PSYCHIATRY | Facility: CLINIC | Age: 49
End: 2024-04-01

## 2024-04-01 DIAGNOSIS — I73.00 RAYNAUD'S DISEASE WITHOUT GANGRENE: ICD-10-CM

## 2024-04-01 RX ORDER — AMLODIPINE BESYLATE 10 MG/1
5 TABLET ORAL DAILY
Qty: 45 TABLET | Refills: 1 | Status: SHIPPED | OUTPATIENT
Start: 2024-04-01

## 2024-04-01 NOTE — PSYCH
Mikala called.  Repeat EKG shows Qtc now normal at 426 (was >500).  She is down to 10 mg lexapro, off phentermine (rx PCP) and has stopped caffeine.  Has also decreased cigarettes. Plan: cont meds unchanged and will re-eval in office on 4/22.    Reason for Admission:   Unstable angina ([facility] [address] [I20.0]               RUR Score:     Low, 6%             Resources/supports,as identified by patient/family:   Lives w/ wife Mary      Barriers to discharge:  None; awaiting cardiac cath with possible stents on Tuesday    PCP: Dr. Motrensen [facility] - last visit was 5/9/22       Current Advanced Directive/Advance Care Plan:   No; full code    Healthcare Decision Maker:     Click here to complete [address] including selection of the [address] Relationship (ie \"Primary\")    Marco Goldberg, spouse - Primary - [facility] 5/27/2022   Patient's [address] is: Patient declined (Legal Next of Kin remains as decision maker)   Confirm Advance Directive None   Patient Would Like to Complete Advance Directive No   Does the patient have other document types Other (comment)                             Plan for utilizing home health:    none                      Likelihood of readmission:   HIGH    Transition of Care Plan:                    Initial assessment completed with patient. Cognitive status of patient: alert and oriented x 3. Face sheet information confirmed:  yes  The patient designates spouse Marco Goldberg to participate in his discharge plan and to receive any needed information. This patient lives in a house with spouse. Patient isable to navigate steps as needed. Prior to hospitalization, patient was considered to be independent with ADLs/IADLS : yes . Patient has a current ACP document on file: no  The spouse will be available to transport patient home upon discharge. The patient has no DME at home and has been independent prior to admission. Patient is not currently active with home health. Currently, the discharge plan is home when cleared by cardiology - cath planned for Tuesday.     The patient states that he can obtain his medications from the pharmacy, and take his medications as directed.     Patient's current insurance is Medicare A&B,  for Life

## 2024-04-01 NOTE — PSYCH
"Behavioral Health Psychotherapy Progress Note    Psychotherapy Provided: Individual Psychotherapy     1. MDD (major depressive disorder), recurrent episode, moderate (HCC)            Goals addressed in session: Goal 1     DATA: Met with Mikala. Session began with open ended questions to gain feedback on mood. She expressed positive spirits. She expressed she is enjoying being back at work part time and having enough time to work on her crafts. She did report that she has not been sleeping well and only slept \"about 45 minutes\" last night. She expressed she did speak with med provider about this and they are working to change her medications. She also decreased her caffeine intake significantly. Session focused on processing her emotions and anxiety related symptoms. Mikala continues to struggle with feeling that she is not good enough for others. She focuses heavily on her looks and how she is struggling with aging. Session ended with processing self-esteem building exercises. She continues to journal and this has been beneficial for her mood.   During this session, this clinician used the following therapeutic modalities: Dialectical Behavior Therapy and Mindfulness-based Strategies    Substance Abuse was not addressed during this session. If the client is diagnosed with a co-occurring substance use disorder, please indicate any changes in the frequency or amount of use: n/a. Stage of change for addressing substance use diagnoses: No substance use/Not applicable    ASSESSMENT:  Mikala Simmons presents with a Euthymic/ normal mood.     her affect is Normal range and intensity, which is congruent, with her mood and the content of the session. The client has made progress on their goals.     Mikala Simmons presents with a none risk of suicide, none risk of self-harm, and none risk of harm to others.    For any risk assessment that surpasses a \"low\" rating, a safety plan must be developed.    A safety plan " was indicated: no  If yes, describe in detail n/a    PLAN: Between sessions, Mikala Simmons will use natural supports and coping skills. At the next session, the therapist will use Dialectical Behavior Therapy and Supportive Psychotherapy to address self-esteem building .    Behavioral Health Treatment Plan and Discharge Planning: Mikala Simmons is aware of and agrees to continue to work on their treatment plan. They have identified and are working toward their discharge goals. yes    Visit start and stop times:    03/27/24  Start Time: 1500  Stop Time: 1536  Total Visit Time: 36 minutes

## 2024-04-22 ENCOUNTER — TELEMEDICINE (OUTPATIENT)
Dept: PSYCHIATRY | Facility: CLINIC | Age: 49
End: 2024-04-22
Payer: COMMERCIAL

## 2024-04-22 DIAGNOSIS — F33.1 MODERATE EPISODE OF RECURRENT MAJOR DEPRESSIVE DISORDER (HCC): Primary | ICD-10-CM

## 2024-04-22 DIAGNOSIS — F41.1 GENERALIZED ANXIETY DISORDER: ICD-10-CM

## 2024-04-22 DIAGNOSIS — F43.10 PTSD (POST-TRAUMATIC STRESS DISORDER): ICD-10-CM

## 2024-04-22 PROCEDURE — 99214 OFFICE O/P EST MOD 30 MIN: CPT | Performed by: PHYSICIAN ASSISTANT

## 2024-04-22 RX ORDER — ESCITALOPRAM OXALATE 10 MG/1
5 TABLET ORAL DAILY
COMMUNITY
Start: 2024-04-22
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
## 2024-04-26 ENCOUNTER — TELEPHONE (OUTPATIENT)
Dept: SURGERY | Facility: CLINIC | Age: 49
End: 2024-04-26

## 2024-05-06 ENCOUNTER — TELEMEDICINE (OUTPATIENT)
Dept: BEHAVIORAL/MENTAL HEALTH CLINIC | Facility: CLINIC | Age: 49
End: 2024-05-06
Payer: COMMERCIAL

## 2024-05-06 DIAGNOSIS — F33.1 MDD (MAJOR DEPRESSIVE DISORDER), RECURRENT EPISODE, MODERATE (HCC): Primary | ICD-10-CM

## 2024-05-06 DIAGNOSIS — F41.1 GAD (GENERALIZED ANXIETY DISORDER): ICD-10-CM

## 2024-05-06 PROCEDURE — 90832 PSYTX W PT 30 MINUTES: CPT

## 2024-05-13 ENCOUNTER — TELEMEDICINE (OUTPATIENT)
Dept: PSYCHIATRY | Facility: CLINIC | Age: 49
End: 2024-05-13

## 2024-05-13 DIAGNOSIS — F32.9 MAJOR DEPRESSIVE DISORDER WITH CURRENT ACTIVE EPISODE, UNSPECIFIED DEPRESSION EPISODE SEVERITY, UNSPECIFIED WHETHER RECURRENT: Primary | ICD-10-CM

## 2024-05-13 DIAGNOSIS — F41.1 GENERALIZED ANXIETY DISORDER: ICD-10-CM

## 2024-05-13 DIAGNOSIS — F43.10 PTSD (POST-TRAUMATIC STRESS DISORDER): ICD-10-CM

## 2024-05-13 RX ORDER — DIVALPROEX SODIUM 250 MG/1
250 TABLET, EXTENDED RELEASE ORAL
Qty: 30 TABLET | Refills: 0 | Status: SHIPPED | OUTPATIENT
Start: 2024-05-13 | End: 2024-05-24 | Stop reason: SDUPTHER
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
## 2024-05-14 NOTE — PSYCH
Virtual Regular Visit    Verification of patient location:    Patient is located at Home in the following state in which I hold an active license [place]      Assessment/Plan:    Problem List Items Addressed This Visit    None  Visit Diagnoses       MDD (major depressive disorder), recurrent episode, moderate (HCC)    -  Primary    SHANICE (generalized anxiety disorder)                Goals addressed in session: Goal 1          Reason for visit is No chief complaint on file.       Encounter provider Raina Stephens      Recent Visits  No visits were found meeting these conditions.  Showing recent visits within past 7 days and meeting all other requirements  Future Appointments  No visits were found meeting these conditions.  Showing future appointments within next 150 days and meeting all other requirements       The patient was identified by name and date of birth. Mikala Simmons was informed that this is a telemedicine visit and that the visit is being conducted throughthe Epic Embedded platform. She agrees to proceed..  My office door was closed. No one else was in the room.  She acknowledged consent and understanding of privacy and security of the video platform. The patient has agreed to participate and understands they can discontinue the visit at any time.    Patient is aware this is a billable service.     Subjective  Mikala Simmons is a 49 y.o. female  .      HPI     Past Medical History:   Diagnosis Date    Anxiety     Depression     Generalized anxiety disorder     H/O: hysterectomy     LAST ASSESSED 30 DEC 2015    Hypertension     Obesity     Ovarian cyst     PVC (premature ventricular contraction)     Urinary tract infection        Past Surgical History:   Procedure Laterality Date    HYSTERECTOMY      TUMOR REMOVAL         Current Outpatient Medications   Medication Sig Dispense Refill    albuterol (Ventolin HFA) 90 mcg/act inhaler Inhale 2 puffs every 4 (four) hours as needed for wheezing 18 g 2     amLODIPine (NORVASC) 10 mg tablet TAKE 1/2 TABLET BY MOUTH DAILY 45 tablet 1    atorvastatin (LIPITOR) 20 mg tablet TAKE 1 TABLET BY MOUTH EVERY DAY 90 tablet 2    Breo Ellipta 100-25 MCG/INH inhaler INHALE 1 PUFF DAILY RINSE MOUTH AFTER USE. 1 Inhaler 5    divalproex sodium (Depakote ER) 250 mg 24 hr tablet Take 1 tablet (250 mg total) by mouth daily at bedtime 30 tablet 0    ergocalciferol (VITAMIN D2) 50,000 units TAKE 1 CAPSULE BY MOUTH ONE TIME PER WEEK 12 capsule 2    escitalopram (LEXAPRO) 10 mg tablet Take 0.5 tablets (5 mg total) by mouth daily      ipratropium-albuterol (DUO-NEB) 0.5-2.5 mg/3 mL nebulizer solution Take 3 mL by nebulization 4 (four) times a day 60 mL 0    metoprolol succinate (TOPROL-XL) 50 mg 24 hr tablet TAKE 1 TABLET BY MOUTH EVERY DAY 90 tablet 1    NON FORMULARY MEDICAL MARIJUANA       No current facility-administered medications for this visit.        Allergies   Allergen Reactions    Nicotine Polacrilex [Nicotine]      patches    Fluoxetine Anxiety       Review of Systems    Video Exam    There were no vitals filed for this visit.        Behavioral Health Psychotherapy Progress Note    Psychotherapy Provided: Individual Psychotherapy     1. MDD (major depressive disorder), recurrent episode, moderate (HCC)        2. SHANICE (generalized anxiety disorder)            Goals addressed in session: Goal 1     DATA: Met with Mikala. Session began with open ended questions to gain feedback on mood. She expressed positive spirits. She reports she has been doing well and processed an incident with her  that she feels she handled well. She expressed that she was anxious and upset for 3 days; however, this is a significant decrease from the past. She expressed she is feeling more comfortable and confident with herself. Session focused on emotion regulation and processing triggers to emotional dysregulation. Processed self-esteem building and grief. She reports she is handling grief much  "better and provided examples of what she is now able to do with talking about her mother's death. She continues to sleep about 2-3 hours a night and has continued to cut out caffeine.   During this session, this clinician used the following therapeutic modalities: Cognitive Behavioral Therapy and Mindfulness-based Strategies    Substance Abuse was not addressed during this session. If the client is diagnosed with a co-occurring substance use disorder, please indicate any changes in the frequency or amount of use: n/a. Stage of change for addressing substance use diagnoses: No substance use/Not applicable    ASSESSMENT:  Mikala Simmons presents with a Euthymic/ normal mood.     her affect is Normal range and intensity, which is congruent, with her mood and the content of the session. The client has made progress on their goals.     Mikala Simmons presents with a none risk of suicide, none risk of self-harm, and none risk of harm to others.    For any risk assessment that surpasses a \"low\" rating, a safety plan must be developed.    A safety plan was indicated: no  If yes, describe in detail n/a    PLAN: Between sessions, Mikala Simmons will use natural supports and coping skills. At the next session, the therapist will use Cognitive Behavioral Therapy and Mindfulness-based Strategies to address emotion regulation.    Behavioral Health Treatment Plan and Discharge Planning: Mikala Simmons is aware of and agrees to continue to work on their treatment plan. They have identified and are working toward their discharge goals. yes    Visit start and stop times:    05/06/24  Start Time: 1500  Stop Time: 1536  Total Visit Time: 36 minutes  "

## 2024-05-14 NOTE — BH CRISIS PLAN
"Client Name: Mikala Simmons       Client YOB: 1975    [facility]      Creation Date: 5/6/24 Update Date: 5/6/24   Created By: Raina Stephens       Step 1: Warning Signs:   Warning Signs   racing thoughts   crying   \"can't do anything\"   no motivation            Step 2: Internal Coping Strategies:   Internal Coping Strategies   listen to music   go outside   play with animals            Step 3: People and social settings that provide distraction:   Name Contact Information       cousin     Places   home or outside           Step 4: People whom I can ask for help during a crisis:      Name Contact Information           Step 5: Professionals or agencies I can contact during a crisis:      Clinican/Agency Name Phone Emergency Contact    Raina Stephens / BEV Orellana / BEV        Local Emergency Department Emergency Department Phone Emergency Department Address    [facility] [phone] [address] [facility]        Crisis Phone Numbers:   Suicide Prevention Lifeline: Call or Text  988 Crisis Text Line: Text HOME to 212-494   Please note: Some [facility] do not have a separate number for Child/Adolescent specific crisis. If your county is not listed under Child/Adolescent, please call the adult number for your county      Adult Crisis Numbers: Child/Adolescent Crisis Numbers   [facility]: [phone] [facility]: [phone]   [facility]: [phone] [facility]: [phone]   [facility]: [phone] [place]: [phone]   [facility]: [phone] [place]: [phone]   [place]: [phone]   [facility]: [phone]   [facility]: [phone]   [place] [facility]: [phone] (daytime) [phone] (after hours, weekends, holidays)      Step 6: Making the environment safer (plan for lethal means safety):   Patient did not identify any lethal methods: " Yes     Optional: What is most important to me and worth living for?      Jeremías Safety Plan. Mattie Terry and Mervin Richter. Used with permission of the authors.

## 2024-05-21 ENCOUNTER — RA CDI HCC (OUTPATIENT)
Dept: OTHER | Facility: HOSPITAL | Age: 49
End: 2024-05-21

## 2024-05-24 ENCOUNTER — OFFICE VISIT (OUTPATIENT)
Dept: PSYCHIATRY | Facility: CLINIC | Age: 49
End: 2024-05-24

## 2024-05-24 DIAGNOSIS — F31.81 BIPOLAR II DISORDER (HCC): Primary | ICD-10-CM

## 2024-05-24 DIAGNOSIS — F43.10 PTSD (POST-TRAUMATIC STRESS DISORDER): ICD-10-CM

## 2024-05-24 DIAGNOSIS — F41.1 GENERALIZED ANXIETY DISORDER: ICD-10-CM

## 2024-05-24 RX ORDER — DIVALPROEX SODIUM 250 MG/1
250 TABLET, EXTENDED RELEASE ORAL 2 TIMES DAILY
Qty: 60 TABLET | Refills: 0 | Status: SHIPPED | OUTPATIENT
Start: 2024-05-24
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
## 2024-05-29 ENCOUNTER — LAB (OUTPATIENT)
Dept: LAB | Facility: CLINIC | Age: 49
End: 2024-05-29
Payer: COMMERCIAL

## 2024-05-29 ENCOUNTER — OFFICE VISIT (OUTPATIENT)
Dept: INTERNAL MEDICINE CLINIC | Facility: CLINIC | Age: 49
End: 2024-05-29
Payer: COMMERCIAL

## 2024-05-29 VITALS
HEART RATE: 88 BPM | OXYGEN SATURATION: 96 % | WEIGHT: 150.2 LBS | SYSTOLIC BLOOD PRESSURE: 132 MMHG | HEIGHT: 62 IN | TEMPERATURE: 97.2 F | DIASTOLIC BLOOD PRESSURE: 78 MMHG | BODY MASS INDEX: 27.64 KG/M2

## 2024-05-29 DIAGNOSIS — E55.9 VITAMIN D DEFICIENCY: ICD-10-CM

## 2024-05-29 DIAGNOSIS — Z12.31 ENCOUNTER FOR SCREENING MAMMOGRAM FOR BREAST CANCER: ICD-10-CM

## 2024-05-29 DIAGNOSIS — E78.5 DYSLIPIDEMIA: ICD-10-CM

## 2024-05-29 DIAGNOSIS — Z13.220 SCREENING, LIPID: ICD-10-CM

## 2024-05-29 DIAGNOSIS — L84 CALLUS BETWEEN TOES: Primary | ICD-10-CM

## 2024-05-29 DIAGNOSIS — J44.9 CHRONIC OBSTRUCTIVE PULMONARY DISEASE, UNSPECIFIED COPD TYPE (HCC): ICD-10-CM

## 2024-05-29 DIAGNOSIS — Z13.29 SCREENING FOR THYROID DISORDER: ICD-10-CM

## 2024-05-29 PROBLEM — F31.81 BIPOLAR II DISORDER (HCC): Status: ACTIVE | Noted: 2018-08-16

## 2024-05-29 LAB
25(OH)D3 SERPL-MCNC: 48.6 NG/ML (ref 30–100)
CHOLEST SERPL-MCNC: 134 MG/DL
HDLC SERPL-MCNC: 46 MG/DL
LDLC SERPL CALC-MCNC: 75 MG/DL (ref 0–100)
NONHDLC SERPL-MCNC: 88 MG/DL
TRIGL SERPL-MCNC: 66 MG/DL
TSH SERPL DL<=0.05 MIU/L-ACNC: 1.48 UIU/ML (ref 0.45–4.5)

## 2024-05-29 PROCEDURE — 99213 OFFICE O/P EST LOW 20 MIN: CPT | Performed by: PHYSICIAN ASSISTANT

## 2024-05-29 PROCEDURE — 82306 VITAMIN D 25 HYDROXY: CPT

## 2024-05-29 PROCEDURE — 84443 ASSAY THYROID STIM HORMONE: CPT

## 2024-05-29 PROCEDURE — 36415 COLL VENOUS BLD VENIPUNCTURE: CPT

## 2024-05-29 PROCEDURE — 80061 LIPID PANEL: CPT

## 2024-05-29 NOTE — PROGRESS NOTES
Ambulatory Visit  Name: Mikala Simmons      : 1975      MRN: [id number]  Encounter Provider: Eden Coker PA-C  Encounter Date: 2024   Encounter department: [facility], [facility] within [facility]    Assessment & Plan   1. Callus between toes  Assessment & Plan:  Encouraged to use foot file to scale down the callus. Moisture daily and by pad to put between toes to prevent the constant pressure and friction.   2. Encounter for screening mammogram for breast cancer  -     Mammo screening bilateral w 3d & cad; Future; Expected date: 2024  3. Vitamin D deficiency  -     Vitamin D 25 hydroxy; Future  4. Dyslipidemia  -     Lipid panel; Future  5. Screening, lipid  6. Screening for thyroid disorder  -     TSH, 3rd generation with Free T4 reflex; Future  7. Chronic obstructive pulmonary disease, unspecified COPD type (HCC)      Tobacco Cessation Counseling: The patient is sincerely urged to quit consumption of tobacco. She is ready to quit tobacco.         History of Present Illness     Pt presents for routine visit. She is doing well overall. She is due for mammogram, lipid and TSH,. She is up to date with CRC Screening. BP is controlled. She is maintaining her weight loss. She notes trouble sleeping. She has been following with psychiatry and will reach out to them regarding this. She has painful, thickened skin in between her 4th and 5th toes bilaterally.       Review of Systems   Constitutional:  Negative for chills and fever.   HENT:  Negative for congestion, ear pain, hearing loss, postnasal drip, rhinorrhea, sinus pressure, sinus pain, sore throat and trouble swallowing.    Eyes:  Negative for pain and visual disturbance.   Respiratory:  Negative for cough, chest tightness, shortness of breath and wheezing.    Cardiovascular: Negative.  Negative for chest pain, palpitations and leg swelling.   Gastrointestinal:  Negative for abdominal pain, blood in stool, constipation, diarrhea, nausea and  vomiting.   Endocrine: Negative for cold intolerance, heat intolerance, polydipsia, polyphagia and polyuria.   Genitourinary:  Negative for difficulty urinating, dysuria, flank pain and urgency.   Musculoskeletal:  Negative for arthralgias, back pain, gait problem and myalgias.   Skin:  Negative for rash.   Allergic/Immunologic: Negative.    Neurological:  Negative for dizziness, weakness, light-headedness and headaches.   Hematological: Negative.    Psychiatric/Behavioral:  Negative for behavioral problems, dysphoric mood and sleep disturbance. The patient is not nervous/anxious.      Past Medical History:   Diagnosis Date    Anxiety     Depression     Generalized anxiety disorder     H/O: hysterectomy     LAST ASSESSED 30 DEC 2015    Hypertension     Obesity     Ovarian cyst     PVC (premature ventricular contraction)     Urinary tract infection      Past Surgical History:   Procedure Laterality Date    HYSTERECTOMY      TUMOR REMOVAL       Family History   Problem Relation Age of Onset    Diabetes Mother     Cancer Mother     Alcohol abuse Father     Cancer Father     Anxiety disorder Father     Cancer Maternal Grandmother     Hearing loss Maternal Grandfather     Cancer Paternal Grandmother     Alcohol abuse Paternal Grandfather     Substance Abuse Paternal Aunt     Mental illness Paternal Aunt     Alcohol abuse Paternal Aunt         All issues with alcohol    Cancer Paternal Aunt     Psychiatric Illness Paternal Aunt         All issues with phobias    Substance Abuse Paternal Uncle     Mental illness Paternal Uncle     Alcohol abuse Paternal Uncle         All issues with alcohol    Cancer Paternal Uncle     Alcohol abuse Maternal Uncle     Cancer Maternal Uncle     Depression Cousin     Drug abuse Cousin         Meth    Depression Brother     Anxiety disorder Brother     Drug abuse Brother         Steroids    Cancer Maternal Aunt     Cancer Maternal Uncle     Drug abuse Cousin         Meth    Drug abuse Cousin          Cocaine, Meth, pills, Herion     Social History     Tobacco Use    Smoking status: Every Day     Current packs/day: 1.00     Average packs/day: 1 pack/day for 30.0 years (30.0 ttl pk-yrs)     Types: Cigarettes     Passive exposure: Current    Smokeless tobacco: Never   Vaping Use    Vaping status: Former    Substances: Nicotine, Flavoring   Substance and Sexual Activity    Alcohol use: Not Currently     Comment: occasionally    Drug use: Yes     Types: Marijuana    Sexual activity: Yes     Partners: Male     Birth control/protection: Other     Current Outpatient Medications on File Prior to Visit   Medication Sig    albuterol (Ventolin HFA) 90 mcg/act inhaler Inhale 2 puffs every 4 (four) hours as needed for wheezing    amLODIPine (NORVASC) 10 mg tablet TAKE 1/2 TABLET BY MOUTH DAILY    atorvastatin (LIPITOR) 20 mg tablet TAKE 1 TABLET BY MOUTH EVERY DAY    Breo Ellipta 100-25 MCG/INH inhaler INHALE 1 PUFF DAILY RINSE MOUTH AFTER USE.    divalproex sodium (Depakote ER) 250 mg 24 hr tablet Take 1 tablet (250 mg total) by mouth 2 (two) times a day    ergocalciferol (VITAMIN D2) 50,000 units TAKE 1 CAPSULE BY MOUTH ONE TIME PER WEEK    ipratropium-albuterol (DUO-NEB) 0.5-2.5 mg/3 mL nebulizer solution Take 3 mL by nebulization 4 (four) times a day    metoprolol succinate (TOPROL-XL) 50 mg 24 hr tablet TAKE 1 TABLET BY MOUTH EVERY DAY    NON FORMULARY MEDICAL MARIJUANA     Allergies   Allergen Reactions    Nicotine Polacrilex [Nicotine]      patches    Fluoxetine Anxiety     Immunization History   Administered Date(s) Administered    COVID-19 MODERNA VACC 0.5 ML IM 04/07/2021, 05/05/2021, 11/13/2021    INFLUENZA 09/18/2021    Influenza, injectable, quadrivalent, preservative free 0.5 mL 10/18/2019, 11/12/2020, 09/18/2021, 12/08/2023    Pneumococcal Conjugate Vaccine 20-valent (Pcv20), Polysace 12/08/2023    Tdap 12/08/2023     Objective     /78 (BP Location: Left arm, Patient Position: Sitting, Cuff Size:  "Adult)   Pulse 88   Temp (!) 97.2 °F (36.2 °C) (Tympanic)   Ht 5' 2\" (1.575 m)   Wt 68.1 kg (150 lb 3.2 oz)   SpO2 96%   BMI 27.47 kg/m²     Physical Exam  Vitals and nursing note reviewed.   Constitutional:       General: She is not in acute distress.     Appearance: Normal appearance. She is well-developed. She is not diaphoretic.   HENT:      Head: Normocephalic and atraumatic.      Right Ear: External ear normal.      Left Ear: External ear normal.      Nose: Nose normal.      Mouth/Throat:      Pharynx: No oropharyngeal exudate.   Eyes:      General: No scleral icterus.        Right eye: No discharge.         Left eye: No discharge.      Conjunctiva/sclera: Conjunctivae normal.      Pupils: Pupils are equal, round, and reactive to light.   Neck:      Thyroid: No thyromegaly.   Cardiovascular:      Rate and Rhythm: Normal rate and regular rhythm.      Heart sounds: Normal heart sounds. No murmur heard.     No friction rub. No gallop.   Pulmonary:      Effort: Pulmonary effort is normal. No respiratory distress.      Breath sounds: Normal breath sounds. No wheezing or rales.   Abdominal:      General: Bowel sounds are normal. There is no distension.      Palpations: Abdomen is soft.      Tenderness: There is no abdominal tenderness.   Musculoskeletal:         General: No tenderness or deformity. Normal range of motion.      Cervical back: Normal range of motion and neck supple.        Feet:    Skin:     General: Skin is warm and dry.   Neurological:      Mental Status: She is alert and oriented to person, place, and time.      Cranial Nerves: No cranial nerve deficit.   Psychiatric:         Behavior: Behavior normal.         Thought Content: Thought content normal.         Judgment: Judgment normal.       Administrative Statements     "

## 2024-05-29 NOTE — ASSESSMENT & PLAN NOTE
Encouraged to use foot file to scale down the callus. Moisture daily and by pad to put between toes to prevent the constant pressure and friction.

## 2024-06-03 NOTE — RESULT ENCOUNTER NOTE
Carlo Bach  I received and reviewed your recent labs and everything looked good. Please reach out with any specific questions or concerns. Have a great day  -Eden

## 2024-06-05 DIAGNOSIS — F33.1 MODERATE EPISODE OF RECURRENT MAJOR DEPRESSIVE DISORDER (HCC): ICD-10-CM

## 2024-06-05 DIAGNOSIS — F43.10 PTSD (POST-TRAUMATIC STRESS DISORDER): ICD-10-CM

## 2024-06-05 RX ORDER — QUETIAPINE FUMARATE 25 MG/1
25 TABLET, FILM COATED ORAL
Qty: 90 TABLET | Refills: 0 | OUTPATIENT
Start: 2024-06-05

## 2024-06-12 ENCOUNTER — TELEPHONE (OUTPATIENT)
Dept: SURGERY | Facility: CLINIC | Age: 49
End: 2024-06-12

## 2024-06-12 NOTE — TELEPHONE ENCOUNTER
Called pt and left message to give central scheduling a call ([phone])  to schedule her Ultrasound.

## 2024-06-15 DIAGNOSIS — F41.1 GENERALIZED ANXIETY DISORDER: ICD-10-CM

## 2024-06-15 DIAGNOSIS — F31.81 BIPOLAR II DISORDER (HCC): ICD-10-CM

## 2024-06-15 DIAGNOSIS — F43.10 PTSD (POST-TRAUMATIC STRESS DISORDER): ICD-10-CM

## 2024-06-17 ENCOUNTER — TELEMEDICINE (OUTPATIENT)
Dept: BEHAVIORAL/MENTAL HEALTH CLINIC | Facility: CLINIC | Age: 49
End: 2024-06-17

## 2024-06-17 DIAGNOSIS — Z91.199 NO-SHOW FOR APPOINTMENT: Primary | ICD-10-CM

## 2024-06-17 NOTE — PSYCH
No Call. No Show. No Charge    Mikala Simmons no showed 06/17/24 appointment , staff called and left message to reschedule appointment     Treatment Plan not due at this session.

## 2024-06-18 DIAGNOSIS — F17.200 SMOKING: ICD-10-CM

## 2024-06-18 RX ORDER — ALBUTEROL SULFATE 90 UG/1
AEROSOL, METERED RESPIRATORY (INHALATION)
Qty: 18 G | Refills: 5 | Status: SHIPPED | OUTPATIENT
Start: 2024-06-18

## 2024-06-19 ENCOUNTER — OFFICE VISIT (OUTPATIENT)
Dept: PSYCHIATRY | Facility: CLINIC | Age: 49
End: 2024-06-19
Payer: COMMERCIAL

## 2024-06-19 DIAGNOSIS — F41.1 GENERALIZED ANXIETY DISORDER: ICD-10-CM

## 2024-06-19 DIAGNOSIS — F31.81 BIPOLAR II DISORDER (HCC): Primary | ICD-10-CM

## 2024-06-19 DIAGNOSIS — F43.10 PTSD (POST-TRAUMATIC STRESS DISORDER): ICD-10-CM

## 2024-06-19 PROCEDURE — 99213 OFFICE O/P EST LOW 20 MIN: CPT | Performed by: PHYSICIAN ASSISTANT

## 2024-06-19 RX ORDER — DIVALPROEX SODIUM 250 MG/1
250 TABLET, EXTENDED RELEASE ORAL 2 TIMES DAILY
Qty: 180 TABLET | Refills: 1 | OUTPATIENT
Start: 2024-06-19
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
## 2024-07-08 DIAGNOSIS — E66.01 CLASS 3 SEVERE OBESITY DUE TO EXCESS CALORIES WITHOUT SERIOUS COMORBIDITY WITH BODY MASS INDEX (BMI) OF 40.0 TO 44.9 IN ADULT (HCC): Primary | ICD-10-CM

## 2024-07-08 RX ORDER — PHENTERMINE HYDROCHLORIDE 37.5 MG/1
37.5 CAPSULE ORAL EVERY MORNING
Qty: 30 CAPSULE | Refills: 2 | Status: SHIPPED | OUTPATIENT
Start: 2024-07-08

## 2024-07-09 DIAGNOSIS — F41.1 GENERALIZED ANXIETY DISORDER: Primary | ICD-10-CM

## 2024-07-09 RX ORDER — ALPRAZOLAM 1 MG/1
1 TABLET ORAL
Qty: 30 TABLET | Refills: 0 | Status: SHIPPED | OUTPATIENT
Start: 2024-07-09

## 2024-07-09 RX ORDER — ESCITALOPRAM OXALATE 10 MG/1
10 TABLET ORAL DAILY
Qty: 30 TABLET | Refills: 2 | Status: SHIPPED | OUTPATIENT
Start: 2024-07-09

## 2024-07-31 ENCOUNTER — OFFICE VISIT (OUTPATIENT)
Age: 49
End: 2024-07-31
Payer: COMMERCIAL

## 2024-07-31 VITALS
SYSTOLIC BLOOD PRESSURE: 142 MMHG | TEMPERATURE: 97.4 F | HEIGHT: 62 IN | BODY MASS INDEX: 28.82 KG/M2 | HEART RATE: 86 BPM | WEIGHT: 156.6 LBS | RESPIRATION RATE: 20 BRPM | DIASTOLIC BLOOD PRESSURE: 74 MMHG | OXYGEN SATURATION: 97 %

## 2024-07-31 DIAGNOSIS — Z80.0 FAMILY HISTORY OF GASTRIC CANCER: ICD-10-CM

## 2024-07-31 DIAGNOSIS — K82.4 GALLBLADDER POLYP: ICD-10-CM

## 2024-07-31 DIAGNOSIS — R10.13 DYSPEPSIA: Primary | ICD-10-CM

## 2024-07-31 DIAGNOSIS — Z80.0 FAMILY HISTORY OF COLON CANCER IN FATHER: ICD-10-CM

## 2024-07-31 PROBLEM — K52.9 COLITIS: Status: RESOLVED | Noted: 2023-12-11 | Resolved: 2024-07-31

## 2024-07-31 PROBLEM — R10.12 LEFT UPPER QUADRANT ABDOMINAL PAIN: Status: RESOLVED | Noted: 2024-01-17 | Resolved: 2024-07-31

## 2024-07-31 PROCEDURE — 99214 OFFICE O/P EST MOD 30 MIN: CPT | Performed by: STUDENT IN AN ORGANIZED HEALTH CARE EDUCATION/TRAINING PROGRAM

## 2024-07-31 NOTE — ASSESSMENT & PLAN NOTE
5 mm polyp noted on prior imaging.  Has seen surgery.  She does have a strong family history of gastric, colon, and ovarian carcinoma.    Agree with continued surveillance with repeat ultrasound

## 2024-07-31 NOTE — ASSESSMENT & PLAN NOTE
Family history of colon cancer in her father.  Recent colonoscopy with 2 tubular adenomas removed.  Recommend recall in 5 years based on family history and personal history of colon polyps.    Next colonoscopy due in 5 years (2029)

## 2024-07-31 NOTE — ASSESSMENT & PLAN NOTE
Symptoms are improved.  Status post EGD.  Findings reviewed with patient.  Biopsies negative for H. pylori infection.    Follow clinically

## 2024-07-31 NOTE — PROGRESS NOTES
[facility]  Outpatient Follow-up  Encounter: [id number]    PATIENT INFO     Name: Mikala Simmons  YOB: 1975   Age: 49 y.o.   Sex: female   MRN: [id number]    ASSESSMENT & PLAN     Mikala Simmons is a 49 y.o. female with evidence of colitis on prior CT, family history of colon cancer in her father diagnosed in his 70s, dyspepsia symptoms, and family history of gastric cancer in her mother diagnosed in her 60s, and also gallbladder polyp who presents to GI office for follow-up.    Problem List Items Addressed This Visit       Gallbladder polyp     5 mm polyp noted on prior imaging.  Has seen surgery.  She does have a strong family history of gastric, colon, and ovarian carcinoma.    Agree with continued surveillance with repeat ultrasound         Family history of colon cancer in father     Family history of colon cancer in her father.  Recent colonoscopy with 2 tubular adenomas removed.  Recommend recall in 5 years based on family history and personal history of colon polyps.    Next colonoscopy due in 5 years (2029)         Dyspepsia - Primary     Symptoms are improved.  Status post EGD.  Findings reviewed with patient.  Biopsies negative for H. pylori infection.    Follow clinically         Family history of gastric cancer     No orders of the defined types were placed in this encounter.      FOLLOW-UP: 1 year    HISTORY OF PRESENT ILLNESS       Mikala Simmons is a 49 y.o. female who presents to GI office for follow-up.  Previously seen for complaints of epigastric pain, evidence of colitis on CT imaging, and family history of colon cancer as well as gastric cancer.  Patient has been doing very well since her last office visit and since her procedures.  Her previous upper GI complaints have largely resolved.  Additionally, the left upper quadrant pain has resolved.  EGD was largely unremarkable with the exception of irregular Z-line and a small sliding  hiatal hernia.  She denies any overt reflux or heartburn symptoms currently.  Denies any dysphagia or odynophagia.  Colonoscopy was notable for 2 subcentimeter polyps which were tubular adenomas.  She does have moderate pandiverticulosis and internal and external hemorrhoids.  These are not currently bothering her although she does report sometimes having some itching with her hemorrhoids.  Fortunately, has not required any interventions or topical creams for her hemorrhoids yet.  Offers no other acute GI complaints today.     ENDOSCOPIC HISTORY     UPPER ENDOSCOPY: 2/16/2024 with irregular Z-line, 2 cm sliding hiatal hernia, otherwise unremarkable (biopsies negative)    COLONOSCOPY: 2/16/2024 with tubular adenoma x 2 removed, moderate pandiverticulosis, internal and external hemorrhoids; recommended repeat in 5 years    REVIEW OF SYSTEMS     CONSTITUTIONAL: Denies any fever, chills, rigors, and weight loss  HEENT: No earache or tinnitus, denies hearing loss or visual disturbances  CARDIOVASCULAR: No chest pain or palpitations  RESPIRATORY: Denies any cough, hemoptysis, shortness of breath or dyspnea on exertion  GASTROINTESTINAL: As noted in the History of Present Illness  GENITOURINARY: No problems with urination, denies any hematuria or dysuria  NEUROLOGIC: No dizziness or vertigo, denies headaches   MUSCULOSKELETAL: Denies any muscle or joint pain   SKIN: Denies jaundice or itching  PSYCHOSOCIAL: Denies depression or anxiety, denies any recent memory loss     Historical Information   Past Medical History:   Diagnosis Date    Anxiety     Depression     Generalized anxiety disorder     H/O: hysterectomy     LAST ASSESSED 30 DEC 2015    Hypertension     Obesity     Ovarian cyst     PVC (premature ventricular contraction)     Urinary tract infection      Past Surgical History:   Procedure Laterality Date    HYSTERECTOMY      TUMOR REMOVAL       Social History   Social History     Substance and Sexual Activity    Alcohol Use Not Currently    Comment: occasionally     Social History     Substance and Sexual Activity   Drug Use Yes    Types: Marijuana     Social History     Tobacco Use   Smoking Status Every Day    Current packs/day: 1.00    Average packs/day: 1 pack/day for 30.0 years (30.0 ttl pk-yrs)    Types: Cigarettes    Passive exposure: Current   Smokeless Tobacco Never     Family History   Problem Relation Age of Onset    Diabetes Mother     Cancer Mother     Alcohol abuse Father     Cancer Father     Anxiety disorder Father     Cancer Maternal Grandmother     Hearing loss Maternal Grandfather     Cancer Paternal Grandmother     Alcohol abuse Paternal Grandfather     Substance Abuse Paternal Aunt     Mental illness Paternal Aunt     Alcohol abuse Paternal Aunt         All issues with alcohol    Cancer Paternal Aunt     Psychiatric Illness Paternal Aunt         All issues with phobias    Substance Abuse Paternal Uncle     Mental illness Paternal Uncle     Alcohol abuse Paternal Uncle         All issues with alcohol    Cancer Paternal Uncle     Alcohol abuse Maternal Uncle     Cancer Maternal Uncle     Depression Cousin     Drug abuse Cousin         Meth    Depression Brother     Anxiety disorder Brother     Drug abuse Brother         Steroids    Cancer Maternal Aunt     Cancer Maternal Uncle     Drug abuse Cousin         Meth    Drug abuse Cousin         Cocaine, Meth, pills, Herion       MEDICATIONS & ALLERGIES     Current Outpatient Medications   Medication Instructions    albuterol (PROVENTIL HFA,VENTOLIN HFA) 90 mcg/act inhaler TAKE 2 PUFFS BY MOUTH EVERY 4 HOURS AS NEEDED FOR WHEEZE    ALPRAZolam (XANAX) 1 mg, Oral, Daily at bedtime PRN    Breo Ellipta 100-25 MCG/INH inhaler INHALE 1 PUFF DAILY RINSE MOUTH AFTER USE.    ergocalciferol (VITAMIN D2) 50,000 units TAKE 1 CAPSULE BY MOUTH ONE TIME PER WEEK    escitalopram (LEXAPRO) 10 mg, Oral, Daily    ipratropium-albuterol (DUO-NEB) 0.5-2.5 mg/3 mL nebulizer  "solution 3 mL, Nebulization, 4 times daily    NON FORMULARY MEDICAL MARIJUANA    phentermine 37.5 mg, Oral, Every morning     Allergies   Allergen Reactions    Nicotine Polacrilex [Nicotine]      patches    Fluoxetine Anxiety       PHYSICAL EXAM      Objective   Blood pressure 142/74, pulse 86, temperature (!) 97.4 °F (36.3 °C), temperature source Temporal, resp. rate 20, height 5' 2\" (1.575 m), weight 71 kg (156 lb 9.6 oz), SpO2 97%. Body mass index is 28.64 kg/m².    General Appearance:   Alert, cooperative, no distress   HEENT:   Normocephalic, atraumatic, anicteric     Neck:   Supple, symmetrical, trachea midline   Lungs:   Equal chest rise, respirations unlabored    Heart:   Regular rate   Abdomen:   Soft, non-tender, non-distended; normal bowel sounds; no masses, no organomegaly    Rectal:   Deferred    Extremities:   No cyanosis or edema    Neuro:   Moves all 4 extremities    Skin:   No jaundice, rashes, or lesions      LABORATORY RESULTS     No visits with results within 1 Day(s) from this visit.   Latest known visit with results is:   Lab on 05/29/2024   Component Date Value    Cholesterol 05/29/2024 134     Triglycerides 05/29/2024 66     HDL, Direct 05/29/2024 46 (L)     LDL Calculated 05/29/2024 75     Non-HDL-Chol (CHOL-HDL) 05/29/2024 88     TSH 3RD GENERATON 05/29/2024 1.482     Vit D, 25-Hydroxy 05/29/2024 48.6         IMAGING RESULTS     No results found.  I have personally reviewed any available and pertinent imaging study reports.      Ruperto Brandt D.O.  [facility]  Division of Gastroenterology & Hepatology  Available on Tiger[employer]  [email or web address]    ** Please Note: This note is constructed using a voice recognition dictation system. **  "

## 2024-08-01 DIAGNOSIS — F41.1 GENERALIZED ANXIETY DISORDER: ICD-10-CM

## 2024-08-02 RX ORDER — ESCITALOPRAM OXALATE 10 MG/1
10 TABLET ORAL DAILY
Qty: 100 TABLET | Refills: 1 | Status: SHIPPED | OUTPATIENT
Start: 2024-08-02

## 2024-08-05 NOTE — TELEPHONE ENCOUNTER
Patient called requesting refill for Escitalopram 10 mg. Patient made aware medication was refilled on 8/2/24 for 100 with 1 refills to [facility] pharmacy. Patient instructed to contact the pharmacy to obtain refills of medication. Patient verbalized understanding.

## 2024-08-16 DIAGNOSIS — E66.01 CLASS 3 SEVERE OBESITY DUE TO EXCESS CALORIES WITHOUT SERIOUS COMORBIDITY WITH BODY MASS INDEX (BMI) OF 40.0 TO 44.9 IN ADULT (HCC): ICD-10-CM

## 2024-08-16 DIAGNOSIS — F41.1 GENERALIZED ANXIETY DISORDER: ICD-10-CM

## 2024-08-16 RX ORDER — ALPRAZOLAM 1 MG
1 TABLET ORAL
Qty: 30 TABLET | Refills: 0 | Status: SHIPPED | OUTPATIENT
Start: 2024-08-16

## 2024-08-16 RX ORDER — PHENTERMINE HYDROCHLORIDE 37.5 MG/1
37.5 CAPSULE ORAL EVERY MORNING
Qty: 30 CAPSULE | Refills: 0 | Status: SHIPPED | OUTPATIENT
Start: 2024-08-16

## 2024-08-24 ENCOUNTER — HOSPITAL ENCOUNTER (OUTPATIENT)
Dept: ULTRASOUND IMAGING | Facility: HOSPITAL | Age: 49
Discharge: HOME | End: 2024-08-24
Attending: SURGERY
Payer: COMMERCIAL

## 2024-08-24 DIAGNOSIS — K82.4 GALLBLADDER POLYP: ICD-10-CM

## 2024-08-24 PROCEDURE — 76705 ECHO EXAM OF ABDOMEN: CPT

## 2024-09-20 DIAGNOSIS — F41.1 GENERALIZED ANXIETY DISORDER: ICD-10-CM

## 2024-09-20 DIAGNOSIS — E66.01 CLASS 3 SEVERE OBESITY DUE TO EXCESS CALORIES WITHOUT SERIOUS COMORBIDITY WITH BODY MASS INDEX (BMI) OF 40.0 TO 44.9 IN ADULT (HCC): ICD-10-CM

## 2024-09-20 RX ORDER — ALPRAZOLAM 1 MG
1 TABLET ORAL
Qty: 30 TABLET | Refills: 0 | Status: SHIPPED | OUTPATIENT
Start: 2024-09-20

## 2024-09-20 RX ORDER — PHENTERMINE HYDROCHLORIDE 37.5 MG/1
37.5 CAPSULE ORAL EVERY MORNING
Qty: 30 CAPSULE | Refills: 3 | Status: SHIPPED | OUTPATIENT
Start: 2024-09-20

## 2024-09-20 RX ORDER — PHENTERMINE HYDROCHLORIDE 37.5 MG/1
37.5 CAPSULE ORAL EVERY MORNING
Qty: 30 CAPSULE | Refills: 0 | Status: SHIPPED | OUTPATIENT
Start: 2024-09-20

## 2024-11-13 DIAGNOSIS — F41.1 GENERALIZED ANXIETY DISORDER: ICD-10-CM

## 2024-11-13 DIAGNOSIS — E66.01 CLASS 3 SEVERE OBESITY DUE TO EXCESS CALORIES WITHOUT SERIOUS COMORBIDITY WITH BODY MASS INDEX (BMI) OF 40.0 TO 44.9 IN ADULT (HCC): ICD-10-CM

## 2024-11-13 DIAGNOSIS — E66.813 CLASS 3 SEVERE OBESITY DUE TO EXCESS CALORIES WITHOUT SERIOUS COMORBIDITY WITH BODY MASS INDEX (BMI) OF 40.0 TO 44.9 IN ADULT (HCC): ICD-10-CM

## 2024-11-13 RX ORDER — PHENTERMINE HYDROCHLORIDE 37.5 MG/1
37.5 CAPSULE ORAL EVERY MORNING
Qty: 30 CAPSULE | Refills: 0 | Status: SHIPPED | OUTPATIENT
Start: 2024-11-13

## 2024-11-13 RX ORDER — ALPRAZOLAM 1 MG/1
1 TABLET ORAL
Qty: 30 TABLET | Refills: 0 | Status: SHIPPED | OUTPATIENT
Start: 2024-11-13

## 2024-11-13 RX ORDER — ESCITALOPRAM OXALATE 10 MG/1
10 TABLET ORAL DAILY
Qty: 90 TABLET | Refills: 1 | Status: SHIPPED | OUTPATIENT
Start: 2024-11-13

## 2024-12-11 ENCOUNTER — OFFICE VISIT (OUTPATIENT)
Dept: INTERNAL MEDICINE CLINIC | Facility: CLINIC | Age: 49
End: 2024-12-11
Payer: COMMERCIAL

## 2024-12-11 VITALS
TEMPERATURE: 97.8 F | HEART RATE: 87 BPM | DIASTOLIC BLOOD PRESSURE: 84 MMHG | SYSTOLIC BLOOD PRESSURE: 136 MMHG | WEIGHT: 155.8 LBS | OXYGEN SATURATION: 98 % | BODY MASS INDEX: 28.67 KG/M2 | HEIGHT: 62 IN

## 2024-12-11 DIAGNOSIS — Z23 ENCOUNTER FOR IMMUNIZATION: ICD-10-CM

## 2024-12-11 DIAGNOSIS — Z00.00 WELL ADULT EXAM: Primary | ICD-10-CM

## 2024-12-11 DIAGNOSIS — Z12.31 ENCOUNTER FOR SCREENING MAMMOGRAM FOR BREAST CANCER: ICD-10-CM

## 2024-12-11 DIAGNOSIS — F17.219 CIGARETTE NICOTINE DEPENDENCE WITH NICOTINE-INDUCED DISORDER: ICD-10-CM

## 2024-12-11 DIAGNOSIS — B35.1 TINEA UNGUIUM: ICD-10-CM

## 2024-12-11 PROBLEM — D72.829 LEUKOCYTOSIS: Status: RESOLVED | Noted: 2024-01-02 | Resolved: 2024-12-11

## 2024-12-11 PROBLEM — D75.839 THROMBOCYTOSIS: Status: RESOLVED | Noted: 2024-01-02 | Resolved: 2024-12-11

## 2024-12-11 PROBLEM — N83.209 CYST OF OVARY: Status: RESOLVED | Noted: 2019-09-18 | Resolved: 2024-12-11

## 2024-12-11 PROBLEM — R63.5 WEIGHT GAIN: Status: RESOLVED | Noted: 2018-05-04 | Resolved: 2024-12-11

## 2024-12-11 PROCEDURE — 90656 IIV3 VACC NO PRSV 0.5 ML IM: CPT

## 2024-12-11 PROCEDURE — 90471 IMMUNIZATION ADMIN: CPT

## 2024-12-11 PROCEDURE — 99396 PREV VISIT EST AGE 40-64: CPT | Performed by: PHYSICIAN ASSISTANT

## 2024-12-11 RX ORDER — VARENICLINE TARTRATE 0.5 (11)-1
KIT ORAL
Qty: 53 EACH | Refills: 0 | Status: SHIPPED | OUTPATIENT
Start: 2024-12-11

## 2024-12-11 RX ORDER — CICLOPIROX 80 MG/ML
SOLUTION TOPICAL
Qty: 6 ML | Refills: 2 | Status: SHIPPED | OUTPATIENT
Start: 2024-12-11

## 2024-12-11 NOTE — PROGRESS NOTES
Adult Annual Physical  Name: Mikala Simmons      : 1975      MRN: [id number]  Encounter Provider: Eden Coker PA-C  Encounter Date: 2024   Encounter department: [facility]    Assessment & Plan  Encounter for screening mammogram for breast cancer    Orders:  •  Mammo screening bilateral w 3d and cad; Future    Encounter for immunization    Orders:  •  influenza vaccine preservative-free 0.5 mL IM (Fluzone, Afluria, Fluarix, Flulaval)    Tinea unguium    Orders:  •  ciclopirox (PENLAC) 8 % solution; Apply topically daily at bedtime    Cigarette nicotine dependence with nicotine-induced disorder    Orders:  •  Varenicline Tartrate, Starter, (Chantix Starting Month ) 0.5 MG X 11 & 1 MG X 42 TBPK; 0.5 mg once daily for 3 days then 0.5mg twice daily for days 4-7 then 1 mg twice daily    Well adult exam         Immunizations and preventive care screenings were discussed with patient today. Appropriate education was printed on patient's after visit summary.    Counseling:  Labs and CRC screening current. Mammogram ordered.   Would like to start Chantix again as attempt to quit smoking. Directions for use and possible side effects discussed and patient verbalized understanding of these.  Pt has mild fungal infection of right great toenail. Penlac ordered.            History of Present Illness     Adult Annual Physical:  Patient presents for annual physical.     Diet and Physical Activity:  - Diet/Nutrition: well balanced diet.  - Exercise: walking.    General Health:  - Sleep: sleeps well.  - Hearing: normal hearing bilateral ears.  - Vision: goes for regular eye exams.  - Dental: regular dental visits.    /GYN Health:  - Follows with GYN: yes.   - History of STDs: no    Advanced Care Planning:  - Has an advanced directive?: no    - Has a durable medical POA?: no    - ACP document given to patient?: no      Review of Systems   Constitutional:  Negative for chills and fever.    HENT:  Negative for congestion, ear pain, hearing loss, postnasal drip, rhinorrhea, sinus pressure, sinus pain, sore throat and trouble swallowing.    Eyes:  Negative for pain and visual disturbance.   Respiratory:  Negative for cough, chest tightness, shortness of breath and wheezing.    Cardiovascular: Negative.  Negative for chest pain, palpitations and leg swelling.   Gastrointestinal:  Negative for abdominal pain, blood in stool, constipation, diarrhea, nausea and vomiting.   Endocrine: Negative for cold intolerance, heat intolerance, polydipsia, polyphagia and polyuria.   Genitourinary:  Negative for difficulty urinating, dysuria, flank pain and urgency.   Musculoskeletal:  Negative for arthralgias, back pain, gait problem and myalgias.   Skin:  Negative for rash.   Allergic/Immunologic: Negative.    Neurological:  Negative for dizziness, weakness, light-headedness and headaches.   Hematological: Negative.    Psychiatric/Behavioral:  Negative for behavioral problems, dysphoric mood and sleep disturbance. The patient is not nervous/anxious.      Current Outpatient Medications on File Prior to Visit   Medication Sig Dispense Refill   • albuterol (PROVENTIL HFA,VENTOLIN HFA) 90 mcg/act inhaler TAKE 2 PUFFS BY MOUTH EVERY 4 HOURS AS NEEDED FOR WHEEZE 18 g 5   • ALPRAZolam (XANAX) 1 mg tablet Take 1 tablet (1 mg total) by mouth daily at bedtime as needed for anxiety 30 tablet 0   • Breo Ellipta 100-25 MCG/INH inhaler INHALE 1 PUFF DAILY RINSE MOUTH AFTER USE. 1 Inhaler 5   • escitalopram (LEXAPRO) 10 mg tablet Take 1 tablet (10 mg total) by mouth daily 90 tablet 1   • ipratropium-albuterol (DUO-NEB) 0.5-2.5 mg/3 mL nebulizer solution Take 3 mL by nebulization 4 (four) times a day 60 mL 0   • NON FORMULARY MEDICAL MARIJUANA     • phentermine 37.5 MG capsule TAKE 1 CAPSULE BY MOUTH EVERY MORNING 30 capsule 3   • phentermine 37.5 MG capsule Take 1 capsule (37.5 mg total) by mouth every morning 30 capsule 0   •  "ergocalciferol (VITAMIN D2) 50,000 units TAKE 1 CAPSULE BY MOUTH ONE TIME PER WEEK (Patient not taking: Reported on 12/11/2024) 12 capsule 2     No current facility-administered medications on file prior to visit.      Social History     Tobacco Use   • Smoking status: Every Day     Current packs/day: 1.00     Average packs/day: 1 pack/day for 30.0 years (30.0 ttl pk-yrs)     Types: Cigarettes     Passive exposure: Current   • Smokeless tobacco: Never   Vaping Use   • Vaping status: Former   • Substances: Nicotine, Flavoring   Substance and Sexual Activity   • Alcohol use: Not Currently     Comment: occasionally   • Drug use: Yes     Types: Marijuana   • Sexual activity: Yes     Partners: Male     Birth control/protection: Other       Objective   /84   Pulse 87   Temp 97.8 °F (36.6 °C) (Tympanic)   Ht 5' 2\" (1.575 m)   Wt 70.7 kg (155 lb 12.8 oz)   SpO2 98%   BMI 28.50 kg/m²     Physical Exam  Vitals and nursing note reviewed.   Constitutional:       General: She is not in acute distress.     Appearance: Normal appearance. She is well-developed. She is not diaphoretic.   HENT:      Head: Normocephalic and atraumatic.      Right Ear: External ear normal.      Left Ear: External ear normal.      Nose: Nose normal.      Mouth/Throat:      Pharynx: No oropharyngeal exudate.   Eyes:      General: No scleral icterus.        Right eye: No discharge.         Left eye: No discharge.      Conjunctiva/sclera: Conjunctivae normal.      Pupils: Pupils are equal, round, and reactive to light.   Neck:      Thyroid: No thyromegaly.   Cardiovascular:      Rate and Rhythm: Normal rate and regular rhythm.      Heart sounds: Normal heart sounds. No murmur heard.     No friction rub. No gallop.   Pulmonary:      Effort: Pulmonary effort is normal. No respiratory distress.      Breath sounds: Normal breath sounds. No wheezing or rales.   Abdominal:      General: Bowel sounds are normal. There is no distension.      " Palpations: Abdomen is soft.      Tenderness: There is no abdominal tenderness.   Musculoskeletal:         General: No tenderness or deformity. Normal range of motion.      Cervical back: Normal range of motion and neck supple.   Skin:     General: Skin is warm and dry.   Neurological:      Mental Status: She is alert and oriented to person, place, and time.      Cranial Nerves: No cranial nerve deficit.   Psychiatric:         Behavior: Behavior normal.         Thought Content: Thought content normal.         Judgment: Judgment normal.       Administrative Statements   I have spent a total time of 20 minutes in caring for this patient on the day of the visit/encounter including Risks and benefits of tx options, Instructions for management, Patient and family education, Importance of tx compliance, Risk factor reductions, Impressions, Counseling / Coordination of care, Documenting in the medical record, and Reviewing / ordering tests, medicine, procedures  .

## 2025-01-07 DIAGNOSIS — F41.1 GENERALIZED ANXIETY DISORDER: ICD-10-CM

## 2025-01-07 DIAGNOSIS — E66.01 CLASS 3 SEVERE OBESITY DUE TO EXCESS CALORIES WITHOUT SERIOUS COMORBIDITY WITH BODY MASS INDEX (BMI) OF 40.0 TO 44.9 IN ADULT (HCC): ICD-10-CM

## 2025-01-07 DIAGNOSIS — E66.813 CLASS 3 SEVERE OBESITY DUE TO EXCESS CALORIES WITHOUT SERIOUS COMORBIDITY WITH BODY MASS INDEX (BMI) OF 40.0 TO 44.9 IN ADULT (HCC): ICD-10-CM

## 2025-01-08 DIAGNOSIS — Z00.6 ENCOUNTER FOR EXAMINATION FOR NORMAL COMPARISON OR CONTROL IN CLINICAL RESEARCH PROGRAM: ICD-10-CM

## 2025-01-08 RX ORDER — ALPRAZOLAM 1 MG/1
1 TABLET ORAL
Qty: 30 TABLET | Refills: 0 | Status: SHIPPED | OUTPATIENT
Start: 2025-01-08

## 2025-01-08 RX ORDER — PHENTERMINE HYDROCHLORIDE 37.5 MG/1
37.5 CAPSULE ORAL EVERY MORNING
Qty: 30 CAPSULE | Refills: 0 | Status: SHIPPED | OUTPATIENT
Start: 2025-01-08

## 2025-01-29 ENCOUNTER — APPOINTMENT (OUTPATIENT)
Dept: LAB | Facility: CLINIC | Age: 50
End: 2025-01-29

## 2025-01-29 ENCOUNTER — HOSPITAL ENCOUNTER (OUTPATIENT)
Dept: MAMMOGRAPHY | Facility: HOSPITAL | Age: 50
Discharge: HOME | End: 2025-01-29
Payer: COMMERCIAL

## 2025-01-29 VITALS — BODY MASS INDEX: 28.52 KG/M2 | HEIGHT: 62 IN | WEIGHT: 155 LBS

## 2025-01-29 DIAGNOSIS — Z00.6 ENCOUNTER FOR EXAMINATION FOR NORMAL COMPARISON OR CONTROL IN CLINICAL RESEARCH PROGRAM: ICD-10-CM

## 2025-01-29 DIAGNOSIS — Z12.31 ENCOUNTER FOR SCREENING MAMMOGRAM FOR BREAST CANCER: ICD-10-CM

## 2025-01-29 PROCEDURE — 36415 COLL VENOUS BLD VENIPUNCTURE: CPT

## 2025-01-29 PROCEDURE — 77067 SCR MAMMO BI INCL CAD: CPT

## 2025-01-29 PROCEDURE — 77063 BREAST TOMOSYNTHESIS BI: CPT

## 2025-01-30 ENCOUNTER — RESULTS FOLLOW-UP (OUTPATIENT)
Dept: INTERNAL MEDICINE CLINIC | Facility: CLINIC | Age: 50
End: 2025-01-30

## 2025-01-30 NOTE — RESULT ENCOUNTER NOTE
Carlo Bach  I reviewed your mammogram and it was was normal. We recommend repeating in one year for maintenance. Have a great day.   Marcy

## 2025-02-10 LAB
APOB+LDLR+PCSK9 GENE MUT ANL BLD/T: NOT DETECTED
BRCA1+BRCA2 DEL+DUP + FULL MUT ANL BLD/T: NOT DETECTED
MLH1+MSH2+MSH6+PMS2 GN DEL+DUP+FUL M: NOT DETECTED

## 2025-04-23 DIAGNOSIS — F41.1 GENERALIZED ANXIETY DISORDER: ICD-10-CM

## 2025-04-23 DIAGNOSIS — E66.813 CLASS 3 SEVERE OBESITY DUE TO EXCESS CALORIES WITHOUT SERIOUS COMORBIDITY WITH BODY MASS INDEX (BMI) OF 40.0 TO 44.9 IN ADULT: ICD-10-CM

## 2025-04-24 RX ORDER — ALPRAZOLAM 1 MG/1
1 TABLET ORAL
Qty: 30 TABLET | Refills: 0 | Status: SHIPPED | OUTPATIENT
Start: 2025-04-24

## 2025-04-24 RX ORDER — PHENTERMINE HYDROCHLORIDE 37.5 MG/1
37.5 CAPSULE ORAL EVERY MORNING
Qty: 30 CAPSULE | Refills: 0 | Status: SHIPPED | OUTPATIENT
Start: 2025-04-24

## 2025-05-07 DIAGNOSIS — F41.1 GENERALIZED ANXIETY DISORDER: ICD-10-CM

## 2025-05-08 RX ORDER — ESCITALOPRAM OXALATE 10 MG/1
10 TABLET ORAL DAILY
Qty: 90 TABLET | Refills: 1 | Status: SHIPPED | OUTPATIENT
Start: 2025-05-08

## 2025-05-19 ENCOUNTER — TELEPHONE (OUTPATIENT)
Age: 50
End: 2025-05-19

## 2025-06-02 DIAGNOSIS — F41.1 GENERALIZED ANXIETY DISORDER: ICD-10-CM

## 2025-06-02 DIAGNOSIS — E66.813 CLASS 3 SEVERE OBESITY DUE TO EXCESS CALORIES WITHOUT SERIOUS COMORBIDITY WITH BODY MASS INDEX (BMI) OF 40.0 TO 44.9 IN ADULT: ICD-10-CM

## 2025-06-03 RX ORDER — ALPRAZOLAM 1 MG/1
1 TABLET ORAL
Qty: 30 TABLET | Refills: 0 | Status: SHIPPED | OUTPATIENT
Start: 2025-06-03

## 2025-06-03 RX ORDER — PHENTERMINE HYDROCHLORIDE 37.5 MG/1
37.5 CAPSULE ORAL EVERY MORNING
Qty: 30 CAPSULE | Refills: 0 | Status: SHIPPED | OUTPATIENT
Start: 2025-06-03

## 2025-07-07 DIAGNOSIS — E66.813 CLASS 3 SEVERE OBESITY DUE TO EXCESS CALORIES WITHOUT SERIOUS COMORBIDITY WITH BODY MASS INDEX (BMI) OF 40.0 TO 44.9 IN ADULT: ICD-10-CM

## 2025-07-08 RX ORDER — PHENTERMINE HYDROCHLORIDE 37.5 MG/1
37.5 CAPSULE ORAL EVERY MORNING
Qty: 30 CAPSULE | Refills: 3 | Status: SHIPPED | OUTPATIENT
Start: 2025-07-08 | End: 2025-07-14

## 2025-07-14 ENCOUNTER — OFFICE VISIT (OUTPATIENT)
Dept: INTERNAL MEDICINE CLINIC | Facility: CLINIC | Age: 50
End: 2025-07-14
Payer: COMMERCIAL

## 2025-07-14 VITALS
DIASTOLIC BLOOD PRESSURE: 92 MMHG | SYSTOLIC BLOOD PRESSURE: 138 MMHG | BODY MASS INDEX: 30.73 KG/M2 | HEART RATE: 88 BPM | OXYGEN SATURATION: 97 % | TEMPERATURE: 97.5 F | WEIGHT: 167 LBS | HEIGHT: 62 IN

## 2025-07-14 DIAGNOSIS — E55.9 VITAMIN D DEFICIENCY: ICD-10-CM

## 2025-07-14 DIAGNOSIS — Z13.29 SCREENING FOR THYROID DISORDER: ICD-10-CM

## 2025-07-14 DIAGNOSIS — L60.3 BRITTLE NAILS: ICD-10-CM

## 2025-07-14 DIAGNOSIS — Z13.0 SCREENING FOR DEFICIENCY ANEMIA: ICD-10-CM

## 2025-07-14 DIAGNOSIS — Z13.1 SCREENING FOR DIABETES MELLITUS: ICD-10-CM

## 2025-07-14 DIAGNOSIS — E66.813 CLASS 3 SEVERE OBESITY DUE TO EXCESS CALORIES WITHOUT SERIOUS COMORBIDITY WITH BODY MASS INDEX (BMI) OF 40.0 TO 44.9 IN ADULT: ICD-10-CM

## 2025-07-14 DIAGNOSIS — Z23 ENCOUNTER FOR IMMUNIZATION: Primary | ICD-10-CM

## 2025-07-14 DIAGNOSIS — E78.5 DYSLIPIDEMIA: ICD-10-CM

## 2025-07-14 DIAGNOSIS — Z13.220 SCREENING, LIPID: ICD-10-CM

## 2025-07-14 PROCEDURE — 90471 IMMUNIZATION ADMIN: CPT

## 2025-07-14 PROCEDURE — 99213 OFFICE O/P EST LOW 20 MIN: CPT | Performed by: PHYSICIAN ASSISTANT

## 2025-07-14 PROCEDURE — 90750 HZV VACC RECOMBINANT IM: CPT

## 2025-07-14 NOTE — ASSESSMENT & PLAN NOTE
Continue phentermine and add topamax. Directions for use and possible side effects discussed and patient verbalized understanding of these.

## 2025-07-14 NOTE — PROGRESS NOTES
Name: Mikala Simmons      : 1975      MRN: [id number]  Encounter Provider: Eden Coker PA-C  Encounter Date: 2025   Encounter department: [facility]  :  Assessment & Plan  Encounter for immunization    Orders:  •  Zoster Vaccine Recombinant IM    Dyslipidemia    Orders:  •  Lipid panel    Vitamin D deficiency    Orders:  •  Vitamin D 25 hydroxy    Screening, lipid         Screening for thyroid disorder    Orders:  •  TSH, 3rd generation    Screening for deficiency anemia    Orders:  •  Comprehensive metabolic panel    Screening for diabetes mellitus    Orders:  •  CBC and differential    Brittle nails    Orders:  •  Magnesium  •  Vitamin B12    Class 3 severe obesity due to excess calories without serious comorbidity with body mass index (BMI) of 40.0 to 44.9 in adult  Continue phentermine and add topamax. Directions for use and possible side effects discussed and patient verbalized understanding of these.                History of Present Illness   Pt presents for routine visit. She is doing well overall. BP is slightly elevated but she notes she got upset when she was weighed and saw that she gained weight. She is up to fate with CRC screening and mammogram. She is due for lung screening CT and defers at this time. She is due for labs and shingrix. She has been on phentermine but feels like she plateaued and now had recent regain.       Review of Systems   Constitutional:  Negative for chills and fever.   HENT:  Negative for congestion, ear pain, hearing loss, postnasal drip, rhinorrhea, sinus pressure, sinus pain, sore throat and trouble swallowing.    Eyes:  Negative for pain and visual disturbance.   Respiratory:  Negative for cough, chest tightness, shortness of breath and wheezing.    Cardiovascular: Negative.  Negative for chest pain, palpitations and leg swelling.   Gastrointestinal:  Negative for abdominal pain, blood in stool, constipation, diarrhea, nausea and  "vomiting.   Endocrine: Negative for cold intolerance, heat intolerance, polydipsia, polyphagia and polyuria.   Genitourinary:  Negative for difficulty urinating, dysuria, flank pain and urgency.   Musculoskeletal:  Negative for arthralgias, back pain, gait problem and myalgias.   Skin:  Negative for rash.   Allergic/Immunologic: Negative.    Neurological:  Negative for dizziness, weakness, light-headedness and headaches.   Hematological: Negative.    Psychiatric/Behavioral:  Negative for behavioral problems, dysphoric mood and sleep disturbance. The patient is not nervous/anxious.        Objective   /92   Pulse 88   Temp 97.5 °F (36.4 °C)   Ht 5' 2\" (1.575 m)   Wt 75.8 kg (167 lb)   SpO2 97%   BMI 30.54 kg/m²      Physical Exam  Vitals and nursing note reviewed.   Constitutional:       General: She is not in acute distress.     Appearance: Normal appearance. She is well-developed. She is obese. She is not diaphoretic.   HENT:      Head: Normocephalic and atraumatic.      Right Ear: External ear normal.      Left Ear: External ear normal.      Nose: Nose normal.      Mouth/Throat:      Pharynx: No oropharyngeal exudate.     Eyes:      General: No scleral icterus.        Right eye: No discharge.         Left eye: No discharge.      Conjunctiva/sclera: Conjunctivae normal.      Pupils: Pupils are equal, round, and reactive to light.     Neck:      Thyroid: No thyromegaly.     Cardiovascular:      Rate and Rhythm: Normal rate and regular rhythm.      Heart sounds: Normal heart sounds. No murmur heard.     No friction rub. No gallop.   Pulmonary:      Effort: Pulmonary effort is normal. No respiratory distress.      Breath sounds: Normal breath sounds. No wheezing or rales.   Abdominal:      General: Bowel sounds are normal. There is no distension.      Palpations: Abdomen is soft.      Tenderness: There is no abdominal tenderness.     Musculoskeletal:         General: No tenderness or deformity. Normal range " of motion.      Cervical back: Normal range of motion and neck supple.     Skin:     General: Skin is warm and dry.     Neurological:      Mental Status: She is alert and oriented to person, place, and time.      Cranial Nerves: No cranial nerve deficit.     Psychiatric:         Behavior: Behavior normal.         Thought Content: Thought content normal.         Judgment: Judgment normal.

## 2025-07-18 ENCOUNTER — TELEPHONE (OUTPATIENT)
Dept: INTERNAL MEDICINE CLINIC | Facility: CLINIC | Age: 50
End: 2025-07-18

## 2025-07-20 DIAGNOSIS — E66.813 CLASS 3 SEVERE OBESITY DUE TO EXCESS CALORIES WITHOUT SERIOUS COMORBIDITY WITH BODY MASS INDEX (BMI) OF 40.0 TO 44.9 IN ADULT: Primary | ICD-10-CM

## 2025-07-20 RX ORDER — TOPIRAMATE 25 MG/1
25 TABLET, FILM COATED ORAL 2 TIMES DAILY
Qty: 60 TABLET | Refills: 2 | Status: SHIPPED | OUTPATIENT
Start: 2025-07-20